# Patient Record
Sex: FEMALE | Race: WHITE | HISPANIC OR LATINO | Employment: PART TIME | URBAN - METROPOLITAN AREA
[De-identification: names, ages, dates, MRNs, and addresses within clinical notes are randomized per-mention and may not be internally consistent; named-entity substitution may affect disease eponyms.]

---
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
## 2019-01-13 ENCOUNTER — MYC MEDICAL ADVICE (OUTPATIENT)
Dept: PEDIATRICS | Facility: CLINIC | Age: 19
End: 2019-01-13

## 2019-01-22 ENCOUNTER — TELEPHONE (OUTPATIENT)
Dept: PEDIATRICS | Facility: CLINIC | Age: 19
End: 2019-01-22

## 2019-01-22 NOTE — TELEPHONE ENCOUNTER
[facility] is out of the 15 mg and 30 mg tablets of Buspar.      States that they have 10 mg available.  Approved order for 10 mg tablets take 1.5 tablets.    Marleni KIMBALL RN - [facility]

## 2019-01-22 NOTE — TELEPHONE ENCOUNTER
Fax received from [facility] [place], [place].  Buspirone hcl 15mg tabs on back order please send new prescription.    Thanks  Mamadou BERRY  [employer]

## 2019-02-04 ENCOUNTER — TELEPHONE (OUTPATIENT)
Dept: PEDIATRICS | Facility: CLINIC | Age: 19
End: 2019-02-04

## 2019-02-04 NOTE — TELEPHONE ENCOUNTER
Reason for call:  Other   Patient called regarding (reason for call): call back  Additional comments: Patient states that with last refill she was advised that her medication shape and size may change.  She has questions regarding the directions with her medication. Please call to discuss.     Phone number to reach patient:  Cell number on file:    Telephone Information:   Mobile [phone]       Best Time:  any    Can we leave a detailed message on this number?  YES

## 2019-02-06 NOTE — TELEPHONE ENCOUNTER
Non-detailed message left to return our call.  Marleni KIMBALL RN - Triage  [facility]

## 2019-02-13 NOTE — TELEPHONE ENCOUNTER
Contacted patient and she is on Buspirone.  Patient states that she received a script for her Buspar and they were 10 mg tablets and she is to take 1.5 tablets daily.  Verified patient has been taking 15 mg tablets previously.  Discussed medication has been on back order and so we have had to change tablets to maintain proper dosing.     Marleni KIMBALL RN - Triage  [facility]

## 2019-03-25 ENCOUNTER — MYC REFILL (OUTPATIENT)
Dept: PEDIATRICS | Facility: CLINIC | Age: 19
End: 2019-03-25

## 2019-03-25 DIAGNOSIS — G43.009 MIGRAINE WITHOUT AURA AND WITHOUT STATUS MIGRAINOSUS, NOT INTRACTABLE: ICD-10-CM

## 2019-03-28 RX ORDER — RIZATRIPTAN BENZOATE 5 MG/1
5-10 TABLET ORAL
Qty: 9 TABLET | Refills: 3 | Status: SHIPPED | OUTPATIENT
Start: 2019-03-28 | End: 2019-10-31

## 2019-03-28 NOTE — TELEPHONE ENCOUNTER
"Prescription approved per [facility], [facility] or [facility] refill protocol.  Marleni KIMBALL RN - Triage  [facility]          Requested Prescriptions   Pending Prescriptions Disp Refills     rizatriptan (MAXALT) 5 MG tablet 18 tablet 3     Sig: Take 1-2 tablets (5-10 mg) by mouth at onset of headache for migraine May repeat in 2 hours. Max 6 tablets/24 hours.    Serotonin Agonists Failed - 3/25/2019  6:48 PM       Failed - Serotonin Agonist request needs review.    Please review patient's record. If patient has had 8 or more treatments in the past month, please forward to provider.         Passed - Blood pressure under 140/90 in past 12 months    BP Readings from Last 3 Encounters:   12/19/18 116/74   08/06/18 100/70   06/25/18 102/76                Passed - Recent (12 mo) or future (30 days) visit within the authorizing provider's specialty    Patient had office visit in the last 12 months or has a visit in the next 30 days with authorizing provider or within the authorizing provider's specialty.  See \"Patient Info\" tab in inbasket, or \"Choose Columns\" in Meds & Orders section of the refill encounter.             Passed - Medication is active on med list       Passed - Patient is age 18 or older       Passed - No active pregnancy on record       Passed - No positive pregnancy test in past 12 months          "

## 2019-05-13 DIAGNOSIS — F32.1 MODERATE MAJOR DEPRESSION (H): ICD-10-CM

## 2019-05-13 DIAGNOSIS — F41.1 GAD (GENERALIZED ANXIETY DISORDER): ICD-10-CM

## 2019-05-13 NOTE — TELEPHONE ENCOUNTER
"Requested Prescriptions   Pending Prescriptions Disp Refills     FLUoxetine (PROZAC) 20 MG capsule [Pharmacy Med Name: FLUOXETINE 20MG CAPSULES] 270 capsule 0     Sig: TAKE 3 CAPSULES BY MOUTH EVERY DAY  Last Written Prescription Date:  12/19/2018  Last Fill Quantity: 270 capsule,  # refills: 1    Last office visit: 12/19/2018 with prescribing provider:  Lydia Rodriguez MD       Future Office Visit:           SSRIs Protocol Failed - 5/13/2019 10:21 AM        Failed - PHQ-9 score less than 5 in past 6 months     Please review last PHQ-9 score.   PHQ-9 SCORE 6/25/2018 8/6/2018 12/19/2018   PHQ-9 Total Score 17 18 17     DIAZ-7 SCORE 1/3/2018 8/6/2018 12/19/2018   Total Score 12 14 11                 Passed - Medication is active on med list        Passed - Patient is age 18 or older        Passed - No active pregnancy on record        Passed - No positive pregnancy test in last 12 months        Passed - Recent (6 mo) or future (30 days) visit within the authorizing provider's specialty     Patient had office visit in the last 6 months or has a visit in the next 30 days with authorizing provider or within the authorizing provider's specialty.  See \"Patient Info\" tab in inbasket, or \"Choose Columns\" in Meds & Orders section of the refill encounter.              "

## 2019-05-13 NOTE — TELEPHONE ENCOUNTER
Prozac  Medication is being filled for 1 time refill only due to:  Patient needs to be seen because mood follow up.    Lucille Zuñiga, RN, BSN

## 2019-06-06 ENCOUNTER — OFFICE VISIT (OUTPATIENT)
Dept: URGENT CARE | Facility: URGENT CARE | Age: 19
End: 2019-06-06
Payer: COMMERCIAL

## 2019-06-06 VITALS
BODY MASS INDEX: 25.7 KG/M2 | HEART RATE: 65 BPM | SYSTOLIC BLOOD PRESSURE: 114 MMHG | DIASTOLIC BLOOD PRESSURE: 66 MMHG | TEMPERATURE: 98.5 F | WEIGHT: 169 LBS | OXYGEN SATURATION: 100 %

## 2019-06-06 DIAGNOSIS — M25.511 CHRONIC RIGHT SHOULDER PAIN: Primary | ICD-10-CM

## 2019-06-06 DIAGNOSIS — G89.29 CHRONIC RIGHT SHOULDER PAIN: Primary | ICD-10-CM

## 2019-06-06 PROCEDURE — 99213 OFFICE O/P EST LOW 20 MIN: CPT | Performed by: FAMILY MEDICINE

## 2019-06-06 NOTE — PROGRESS NOTES
SUBJECTIVE:  Chief Complaint   Patient presents with     Urgent Care     Musculoskeletal Problem     right shoulder pain x 1 day. stiffness and pain. Decreased ROM. No known injury, but is avid swimmer. 8/10 Tx: rest, tylenol equivilant     Mireya Fritz is a 18 year old female who presents with a chief complaint of right shoulder pain.    Has struggled with right shoulder pain for the past year, was seen by [facility] and underwent physical therapy without complete resolution of pain.  Patient did obtain MRI and Xray and was told that was overuse.  Was in physical therapy for several months.  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    Yesterday developed acute pain in right shoulder, denies doing anything unusual, no trauma or fall.  Patient states that is painful to move and has not been able to move shoulder.  Tried taking tylenol but has not resolved.  No rash.      Past Medical History:   Diagnosis Date     NO ACTIVE PROBLEMS      Current Outpatient Medications   Medication Sig Dispense Refill     busPIRone (BUSPAR) 15 MG tablet Take 1 tablet (15 mg) by mouth 2 times daily 180 tablet 1     FLUoxetine (PROZAC) 20 MG capsule Take 3 capsules (60 mg) by mouth daily 270 capsule 1     norethindrone-ethinyl estradiol (DASETTA 1/35) 1-35 MG-MCG tablet Take 1 tablet by mouth daily 84 tablet 3     rizatriptan (MAXALT) 5 MG tablet Take 1-2 tablets (5-10 mg) by mouth at onset of headache for migraine May repeat in 2 hours. Max 6 tablets/24 hours. 9 tablet 3     Social History     Tobacco Use     Smoking status: Current Some Day Smoker     Types: Other     Smokeless tobacco: Never Used   Substance Use Topics     Alcohol use: No       ROS:  Review of systems negative except as stated above.    EXAM:   /66   Pulse 65   Temp 98.5  F (36.9  C) (Oral)   Wt 76.7 kg (169 lb)   SpO2 100%   BMI 25.70 kg/m    M/S Exam:right shoulder - discomfort anterior  aspect, decrease ROM.  No discomfort at AC joint.  GENERAL APPEARANCE: healthy, alert and no distress  EXTREMITIES: peripheral pulses normal  SKIN: no suspicious lesions or rashes    X-RAY was not done.    ASSESSMENT/PLAN:  (M25.511,  G89.29) Chronic right shoulder pain  (primary encounter diagnosis)  Plan: ORTHO  REFERRAL            Chronic right shoulder pain with acute worsening.  Reviewed possible tendinitis, frozen shoulder or possible rotator cuff tear.  Patient denies any acute injury that would be suggestive for fracture so will defer Xray.  Will refer to [facility] for further evaluation of shoulder pain.    Follow up with [facility] within 1 week    Wilmar Austin MD  June 6, 2019 6:26 PM

## 2019-06-06 NOTE — PATIENT INSTRUCTIONS
Okay to take ibuprofen 200 mg - 4 tablets (800 mg) every 8 hours as needed.  Okay to take tylenol 500 mg - 2 tablets (1000 mg) every 6-8 hours as needed, do not exceed 3000 mg in 24 hours.    Ice to area of discomfort.    Follow up with [facility] for further evaluation.      Patient Education     Shoulder Impingement Syndrome  The rotator cuff is a group of muscles and tendons that surround the shoulder joint. These muscles and tendons hold the arm in its joint. They help the shoulder move. The rotator cuff muscles and tendons can become irritated from repeated rubbing against the shoulder bone. This is called shoulder impingement syndrome or rotator cuff tendonitis.     If your case is mild, you may only need to rest the shoulder and then do certain exercises to strengthen the muscles. You can also take anti-inflammatory medicines. Steroid injections into the shoulder can ease inflammation. But you can have only a limited number of these. If the condition gets worse, your shoulder muscles may become thin and weak. This can lead to a rotator cuff tear.  Symptoms of shoulder impingement syndrome may include:    Shoulder pain that gets worse when you raise your arm overhead    Weakness of the shoulder muscles when you use your arm overhead    Popping and clicking when you move your shoulder    Shoulder pain that wakes you up at night, especially when you sleep on the affected shoulder    Sudden pain in your shoulder when you lift or reach  Home care  Follow these tips to take care of yourself at home:    Avoid activities that make your pain worse. These include raising your arms overhead, repeating the same motion over and over, or lifting heavy objects.    Don t hold your arm in one position for a long time. Keep it moving.    Put an ice pack on the sore area for 20 minutes every 1 to 2 hours for the first day. You can make an ice pack by putting ice cubes in a plastic bag. Wrap the bag in a towel before putting it on  your shoulder. A frozen bag of peas or something similar can also be used as an ice pack. Use the ice packs 3 to 4 times a day for the next 2 days. Continue using the ice to relieve of pain and swelling as needed.    You may take acetaminophen or ibuprofen to control pain, unless another medicine was prescribed. If prednisone was prescribed, don t take anti-inflammatory medicines. If you have chronic liver or kidney disease or ever had a stomach ulcer or gastrointestinal bleeding, talk with your doctor before using these medicines.    After your symptoms ease, you may get physical therapy or start a home exercise program. This can strengthen your shoulder muscles and help your range of motion. Talk with your doctor about what is best for your condition.  Follow-up care  Follow up with your healthcare provider, or as advised.  When to seek medical advice  Call your healthcare provider right away if any of these occur:    Shoulder pain that gets worse and wakes you up at night    Your shoulder or arm swells    Numbness, tingling, or pain that travels down the arm to the hand    Loss of shoulder strength    Fever or chills  Date Last Reviewed: 8/1/2016 2000-2018 [employer]. [address] [place] [address]. All rights reserved. This information is not intended as a substitute for professional medical care. Always follow your healthcare professional's instructions.           Patient Education     Frozen Shoulder (Adhesive Capsulitis)  Frozen shoulder is when pain and stiffness make it hard to move your shoulder normally. This condition is also called adhesive capsulitis.  The shoulder is a ball-and-socket joint. The ball on the upper arm bone fits into a socket on the shoulder blade. The joint is covered by strong connective tissue called the shoulder capsule.  If the shoulder capsule becomes inflamed and swollen, movement is painful. Bands of scar tissue form on the joint s surface. This  limits your shoulder's range of motion.  In most cases, only one shoulder at a time is affected. Some people may get frozen shoulder in the other shoulder, at a later time.  Frozen shoulder develops slowly in 3 stages. Each stage can last a few months or longer:  1. Painful stage. Pain occurs when raising your arm up or to the side, or when reaching behind your back. Your range of motion decreases. The pain may be worse at night and keep you from sleeping.  2. Frozen stage. Shoulder may be less painful, but it is stiffer. Range of motion is very limited.  3. Thawing stage. Range of motion starts to improve with treatment.  Experts don t know what causes frozen shoulder. It may occur after an injury such as a fracture. Or it may happen if the shoulder is immobile for a long time, such as after surgery. It may also be an autoimmune response. Risk factors include being over age 40, or having diabetes, heart disease, lung disease, or an overactive thyroid.  The diagnosis is made by physical exam and an X-ray of the shoulder joint. Sometimes an MRI is done to look for other causes.  Mild cases may be treated with a home exercise program and anti-inflammatory medicines. More severe cases require physical therapy. In some cases a steroid is shot, or injected, into the shoulder area. In hard to treat cases, forced movement of the shoulder is done while you are asleep under general anesthesia. This will break up scar tissue and increase your range of motion. In rare cases, surgery may be needed to remove the scar tissue. Over time, most people with frozen shoulder get back nearly all range of motion without pain. Recovery may take a few months up to 1 year. You may still feel some stiffness.  Home care    If physical therapy was prescribed, keep up with any home exercise program you were given.    Keep using the affected shoulder and arm as much as possible.    You may use over-the-counter pain medicine to control pain,  unless another pain medicine was prescribed. Anti-inflammatory pain medicines may work better than acetaminophen. If you have chronic liver or kidney disease or ever had a stomach ulcer or gastrointestinal bleeding, talk with your healthcare provider before using these medicines.  Follow-up care  Follow up with your healthcare provider, or as advised. Or follow up sooner if pain increases or your shoulder motion decreases.  If X-rays or an MRI were done, you will be told of any new findings that may affect your care.  When to seek medical advice  Call your healthcare provider right away if any of these occur:    Your shoulder is red or swollen    Your arm feels weak or numb    Your shoulder movement decreases    Your shoulder pain increases even after using pain medicines  Date Last Reviewed: 5/1/2018 2000-2018 [employer]. [address] [place] [address], [place] [address]. All rights reserved. This information is not intended as a substitute for professional medical care. Always follow your healthcare professional's instructions.

## 2019-06-10 DIAGNOSIS — F32.1 MODERATE MAJOR DEPRESSION (H): ICD-10-CM

## 2019-06-10 DIAGNOSIS — F41.1 GAD (GENERALIZED ANXIETY DISORDER): ICD-10-CM

## 2019-06-12 ENCOUNTER — OFFICE VISIT (OUTPATIENT)
Dept: ORTHOPEDICS | Facility: CLINIC | Age: 19
End: 2019-06-12
Payer: COMMERCIAL

## 2019-06-12 VITALS
WEIGHT: 169 LBS | HEIGHT: 68 IN | DIASTOLIC BLOOD PRESSURE: 82 MMHG | BODY MASS INDEX: 25.61 KG/M2 | SYSTOLIC BLOOD PRESSURE: 110 MMHG

## 2019-06-12 DIAGNOSIS — M75.21 BICEPS TENDINITIS OF RIGHT UPPER EXTREMITY: ICD-10-CM

## 2019-06-12 DIAGNOSIS — M75.41 IMPINGEMENT SYNDROME OF RIGHT SHOULDER: Primary | ICD-10-CM

## 2019-06-12 DIAGNOSIS — M25.311 INSTABILITY OF RIGHT SHOULDER JOINT: ICD-10-CM

## 2019-06-12 PROCEDURE — 99244 OFF/OP CNSLTJ NEW/EST MOD 40: CPT | Performed by: FAMILY MEDICINE

## 2019-06-12 RX ORDER — DICLOFENAC SODIUM 75 MG/1
75 TABLET, DELAYED RELEASE ORAL 2 TIMES DAILY PRN
Qty: 60 TABLET | Refills: 1 | Status: SHIPPED | OUTPATIENT
Start: 2019-06-12 | End: 2019-07-01

## 2019-06-12 ASSESSMENT — MIFFLIN-ST. JEOR: SCORE: 1590.08

## 2019-06-12 NOTE — LETTER
6/12/2019         RE: Mireya Fritz  [id number] [place] [address]        Dear Colleague,    Thank you for referring your patient, Mireya Fritz, to the [facility]. Please see a copy of my visit note below.    ASSESSMENT & PLAN  Patient Instructions     1. Impingement syndrome of right shoulder    2. Biceps tendinitis of right upper extremity    3. Instability of right shoulder joint      -Patient has right shoulder pain due to multidirectional laxity and instability causing bursitis and biceps tendinitis.  -Patient will start diclofenac 75 mg twice a day.  -Patient will start formal physical therapy with Vasyl Finney here at [place].  -She will follow-up in 6 weeks for reevaluation and progression of activity.  If no improvement to consider possible cortisone injection.  -Call direct clinic number [[phone]] at any time with questions or concerns.    Albert Yeo MD [facility]  [facility]          -----    SUBJECTIVE  Mireya Fritz is a/an 18 year old Right handed female who is seen in consultation at the request of  Wilmar Austin M.D. for evaluation of right shoulder pain. The patient is seen by themselves.    Onset: 1 week(s) ago. Reports insidious onset without acute precipitating event.  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    Past Medical History:    Diagnosis Date     NO ACTIVE PROBLEMS      Social History     Socioeconomic History     Marital status: Single     Spouse name: Not on file     Number of children: Not on file     Years of education: Not on file     Highest education level: Not on file   Occupational History     Not on file   Social Needs     Financial resource strain: Not on file     Food insecurity:     Worry: Not on file     Inability: Not on file     Transportation needs:     Medical: Not on file     Non-medical: Not on file   Tobacco Use     Smoking status: Current Some Day Smoker     Types: Other     Smokeless tobacco: Never Used   Substance and Sexual Activity     Alcohol use: No     Drug use: No     Sexual activity: Yes     Partners: Male   Lifestyle     Physical activity:     Days per week: Not on file     Minutes per session: Not on file     Stress: Not on file   Relationships     Social connections:     Talks on phone: Not on file     Gets together: Not on file     Attends Quaker service: Not on file     Active member of club or organization: Not on file     Attends meetings of clubs or organizations: Not on file     Relationship status: Not on file     Intimate partner violence:     Fear of current or ex partner: Not on file     Emotionally abused: Not on file     Physically abused: Not on file     Forced sexual activity: Not on file   Other Topics Concern     Parent/sibling w/ CABG, MI or angioplasty before 65F 55M? No   Social History Narrative     Not on file         Patient's past medical, surgical, social, and family histories were reviewed today and no changes are noted.    REVIEW OF SYSTEMS:  10 point ROS is negative other than symptoms noted above in HPI, Past Medical History or as stated below  Constitutional: NEGATIVE for fever, chills, change in weight  Skin: NEGATIVE for worrisome rashes, moles or lesions  GI/: NEGATIVE for bowel or bladder changes  Neuro: NEGATIVE for weakness, dizziness or  a/w sepsis due to likely RML/RLL aspiration PNA c/b mucus plugging, dehydration  in the context of advanced dementia and debility; found to have multiple decubitus ulcers; family reports R shoulder pain and no X-ray finding of a fracture. "paresthesias    OBJECTIVE:  /82   Ht 1.727 m (5' 8\")   Wt 76.7 kg (169 lb)   BMI 25.70 kg/m      General: healthy, alert and in no distress  HEENT: no scleral icterus or conjunctival erythema  Skin: no suspicious lesions or rash. No jaundice.  CV: regular rhythm by palpation  Resp: normal respiratory effort without conversational dyspnea   Psych: normal mood and affect  Gait: normal steady gait with appropriate coordination and balance  Neuro: normal light touch sensory exam of the bilateral upper extremities.    MSK:  RIGHT SHOULDER  Inspection:    no swelling, bruising, discoloration, or obvious deformity or asymmetry  Palpation:    Tender about the acromion, anterior capsule, proximal biceps tendon and greater tuberosity. Remainder of bony and tendinous landmarks are nontender.  Active Range of Motion:     Abduction normal0, FF normal0, ER normal0, IR normal.      Scapular dyskinesis absent  Strength:    Scapular plane abduction 5-/5,  ER 5-/5, IR 5/5, biceps 5/5, triceps 5/5  Special Tests:    Positive: Neer's, Cuadra', supraspinatus (empty can) and sulcus sign    Negative: drop arm/painful arc, crossed arm adduction, [place]'s, Speed's and Yergason's      Independent visualization of the below image:  No results found for this or any previous visit (from the past 24 hour(s)).   MR ARTHROGRAM RIGHT SHOULDER  12/6/2017  Result Impression   IMPRESSION:  Negative MR arthrogram of the right shoulder without evidence of rotator cuff or labral tear.   Result Narrative   COMPARISON:  08/31/2015 radiograph    TECHNIQUE:  Routine MR arthrogram protocol of the right shoulder was performed following the intraarticular injection of 10 mL of saline containing a 1:400 dilution of Gadavist.    FINDINGS:    CORACOACROMIAL ARCH/AC JOINT:  There is no significant degenerative arthritis at the acromioclavicular joint. Type 2 acromion. There is no significant thickening of the coracoacromial or coracohumeral ligaments. " There is no significant narrowing of the subacromial outlet or coracohumeral distance.    ROTATOR CUFF:  The supraspinatus, infraspinatus, teres minor and subscapularis tendons are intact without evidence of tear or tendinopathy. There is no atrophy of the rotator cuff muscles.    SUBACROMIAL/SUBDELTOID BURSA:  Normal.    GLENOHUMERAL JOINT:  There is no significant degenerative arthritis or focal cartilage defect. No osteocartilaginous bodies are identified. There is no gadolinium outside of the glenohumeral joint.    LONG HEAD OF THE BICEPS TENDON/GLENOID LABRUM:  The intra and extraarticular portions of the long head of the biceps tendon are normal. The biceps-labral anchor is intact. There is no evidence of labral tear.    MARROW AND SOFT TISSUES:  There is no abnormal marrow signal. There is no soft tissue mass.   Other Result Information   Ras, Rad Results In - 12/06/2017  2:32 PM CST  COMPARISON:  08/31/2015 radiograph    TECHNIQUE:  Routine MR arthrogram protocol of the right shoulder was performed following the intraarticular injection of 10 mL of saline containing a 1:400 dilution of Gadavist.    FINDINGS:    CORACOACROMIAL ARCH/AC JOINT:  There is no significant degenerative arthritis at the acromioclavicular joint. Type 2 acromion. There is no significant thickening of the coracoacromial or coracohumeral ligaments. There is no significant narrowing of the subacromial outlet or coracohumeral distance.    ROTATOR CUFF:  The supraspinatus, infraspinatus, teres minor and subscapularis tendons are intact without evidence of tear or tendinopathy. There is no atrophy of the rotator cuff muscles.    SUBACROMIAL/SUBDELTOID BURSA:  Normal.    GLENOHUMERAL JOINT:  There is no significant degenerative arthritis or focal cartilage defect. No osteocartilaginous bodies are identified. There is no gadolinium outside of the glenohumeral joint.    LONG HEAD OF THE BICEPS TENDON/GLENOID LABRUM:  The intra and  extraarticular portions of the long head of the biceps tendon are normal. The biceps-labral anchor is intact. There is no evidence of labral tear.    MARROW AND SOFT TISSUES:  There is no abnormal marrow signal. There is no soft tissue mass.    IMPRESSION  IMPRESSION:  Negative MR arthrogram of the right shoulder without evidence of rotator cuff or labral tear.       XR RIGHT SHOULDER 9/2/2015  Result Narrative   Indication: Right shoulder pain.  No fracture, subluxation or joint space abnormality.   Other Result Information   Interface, In Hp Pn Radiology Results - 06/18/2016  5:15 PM CDT  Indication: Right shoulder pain.  No fracture, subluxation or joint space abnormality.           Albert Yeo MD [facility]      Again, thank you for allowing me to participate in the care of your patient.        Sincerely,        Albert Yeo, MD     88y/o F w/ PMH of SBO, HTN, HLD, advanced dementia (AOx0-1 baseline) a/w sepsis due to likely RML/RLL aspiration PNA c/b mucus plugging, dehydration  in the context of advanced dementia and debility; found to have multiple decubitus ulcers; family reports R shoulder pain and no X-ray finding of a fracture. 88y/o F w/ PMH of SBO, HTN, HLD, advanced dementia (AOx0-1 baseline) a/w sepsis due to likely RML/RLL aspiration PNA c/b mucus plugging, dehydration  in the context of advanced dementia and debility; Found to have multiple decubiti; Family reports Rt shoulder pain - no X-ray finding of a fracture;

## 2019-06-12 NOTE — PATIENT INSTRUCTIONS
1. Impingement syndrome of right shoulder    2. Biceps tendinitis of right upper extremity    3. Instability of right shoulder joint      -Patient has right shoulder pain due to multidirectional laxity and instability causing bursitis and biceps tendinitis.  -Reviewed MR arthrogram of the right shoulder.  -Patient will start diclofenac 75 mg twice a day.  -Patient will start formal physical therapy with Vasyl Finney here at [place].  -She will follow-up in 6 weeks for reevaluation and progression of activity.  If no improvement to consider possible cortisone injection.  -Call direct clinic number [[phone]] at any time with questions or concerns.    Albert Yeo MD [facility] and Sports Medicine  [facility] [place]

## 2019-06-12 NOTE — PROGRESS NOTES
ASSESSMENT & PLAN  Patient Instructions     1. Impingement syndrome of right shoulder    2. Biceps tendinitis of right upper extremity    3. Instability of right shoulder joint      -Patient has right shoulder pain due to multidirectional laxity and instability causing bursitis and biceps tendinitis.  -Patient will start diclofenac 75 mg twice a day.  -Patient will start formal physical therapy with Vasyl Finney here at [place].  -She will follow-up in 6 weeks for reevaluation and progression of activity.  If no improvement to consider possible cortisone injection.  -Call direct clinic number [[phone]] at any time with questions or concerns.    Albert Yeo MD [facility]  [facility]          -----    SUBJECTIVE  Mireya Fritz is a/an 18 year old Right handed female who is seen in consultation at the request of  Wilmar Austin M.D. for evaluation of right shoulder pain. The patient is seen by themselves.    Onset: 1 week(s) ago. Reports insidious onset without acute precipitating event.  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    Past Medical History:   Diagnosis Date     NO ACTIVE PROBLEMS      Social History     Socioeconomic History     Marital status: Single     Spouse name: Not on file     Number of children: Not on file     Years of education: Not on file     Highest education level: Not on file  "  Occupational History     Not on file   Social Needs     Financial resource strain: Not on file     Food insecurity:     Worry: Not on file     Inability: Not on file     Transportation needs:     Medical: Not on file     Non-medical: Not on file   Tobacco Use     Smoking status: Current Some Day Smoker     Types: Other     Smokeless tobacco: Never Used   Substance and Sexual Activity     Alcohol use: No     Drug use: No     Sexual activity: Yes     Partners: Male   Lifestyle     Physical activity:     Days per week: Not on file     Minutes per session: Not on file     Stress: Not on file   Relationships     Social connections:     Talks on phone: Not on file     Gets together: Not on file     Attends Congregation service: Not on file     Active member of club or organization: Not on file     Attends meetings of clubs or organizations: Not on file     Relationship status: Not on file     Intimate partner violence:     Fear of current or ex partner: Not on file     Emotionally abused: Not on file     Physically abused: Not on file     Forced sexual activity: Not on file   Other Topics Concern     Parent/sibling w/ CABG, MI or angioplasty before 65F 55M? No   Social History Narrative     Not on file         Patient's past medical, surgical, social, and family histories were reviewed today and no changes are noted.    REVIEW OF SYSTEMS:  10 point ROS is negative other than symptoms noted above in HPI, Past Medical History or as stated below  Constitutional: NEGATIVE for fever, chills, change in weight  Skin: NEGATIVE for worrisome rashes, moles or lesions  GI/: NEGATIVE for bowel or bladder changes  Neuro: NEGATIVE for weakness, dizziness or paresthesias    OBJECTIVE:  /82   Ht 1.727 m (5' 8\")   Wt 76.7 kg (169 lb)   BMI 25.70 kg/m     General: healthy, alert and in no distress  HEENT: no scleral icterus or conjunctival erythema  Skin: no suspicious lesions or rash. No jaundice.  CV: regular rhythm by " palpation  Resp: normal respiratory effort without conversational dyspnea   Psych: normal mood and affect  Gait: normal steady gait with appropriate coordination and balance  Neuro: normal light touch sensory exam of the bilateral upper extremities.    MSK:  RIGHT SHOULDER  Inspection:    no swelling, bruising, discoloration, or obvious deformity or asymmetry  Palpation:    Tender about the acromion, anterior capsule, proximal biceps tendon and greater tuberosity. Remainder of bony and tendinous landmarks are nontender.  Active Range of Motion:     Abduction normal0, FF normal0, ER normal0, IR normal.      Scapular dyskinesis absent  Strength:    Scapular plane abduction 5-/5,  ER 5-/5, IR 5/5, biceps 5/5, triceps 5/5  Special Tests:    Positive: Neer's, Cuadra', supraspinatus (empty can) and sulcus sign    Negative: drop arm/painful arc, crossed arm adduction, [place]'s, Speed's and Yergason's      Independent visualization of the below image:  No results found for this or any previous visit (from the past 24 hour(s)).   MR ARTHROGRAM RIGHT SHOULDER  12/6/2017  Result Impression   IMPRESSION:  Negative MR arthrogram of the right shoulder without evidence of rotator cuff or labral tear.   Result Narrative   COMPARISON:  08/31/2015 radiograph    TECHNIQUE:  Routine MR arthrogram protocol of the right shoulder was performed following the intraarticular injection of 10 mL of saline containing a 1:400 dilution of Gadavist.    FINDINGS:    CORACOACROMIAL ARCH/AC JOINT:  There is no significant degenerative arthritis at the acromioclavicular joint. Type 2 acromion. There is no significant thickening of the coracoacromial or coracohumeral ligaments. There is no significant narrowing of the subacromial outlet or coracohumeral distance.    ROTATOR CUFF:  The supraspinatus, infraspinatus, teres minor and subscapularis tendons are intact without evidence of tear or tendinopathy. There is no atrophy of the rotator cuff  muscles.    SUBACROMIAL/SUBDELTOID BURSA:  Normal.    GLENOHUMERAL JOINT:  There is no significant degenerative arthritis or focal cartilage defect. No osteocartilaginous bodies are identified. There is no gadolinium outside of the glenohumeral joint.    LONG HEAD OF THE BICEPS TENDON/GLENOID LABRUM:  The intra and extraarticular portions of the long head of the biceps tendon are normal. The biceps-labral anchor is intact. There is no evidence of labral tear.    MARROW AND SOFT TISSUES:  There is no abnormal marrow signal. There is no soft tissue mass.   Other Result Information   Ras, Rad Results In - 12/06/2017  2:32 PM CST  COMPARISON:  08/31/2015 radiograph    TECHNIQUE:  Routine MR arthrogram protocol of the right shoulder was performed following the intraarticular injection of 10 mL of saline containing a 1:400 dilution of Gadavist.    FINDINGS:    CORACOACROMIAL ARCH/AC JOINT:  There is no significant degenerative arthritis at the acromioclavicular joint. Type 2 acromion. There is no significant thickening of the coracoacromial or coracohumeral ligaments. There is no significant narrowing of the subacromial outlet or coracohumeral distance.    ROTATOR CUFF:  The supraspinatus, infraspinatus, teres minor and subscapularis tendons are intact without evidence of tear or tendinopathy. There is no atrophy of the rotator cuff muscles.    SUBACROMIAL/SUBDELTOID BURSA:  Normal.    GLENOHUMERAL JOINT:  There is no significant degenerative arthritis or focal cartilage defect. No osteocartilaginous bodies are identified. There is no gadolinium outside of the glenohumeral joint.    LONG HEAD OF THE BICEPS TENDON/GLENOID LABRUM:  The intra and extraarticular portions of the long head of the biceps tendon are normal. The biceps-labral anchor is intact. There is no evidence of labral tear.    MARROW AND SOFT TISSUES:  There is no abnormal marrow signal. There is no soft tissue mass.    IMPRESSION  IMPRESSION:  Negative MR  arthrogram of the right shoulder without evidence of rotator cuff or labral tear.       XR RIGHT SHOULDER 9/2/2015  Result Narrative   Indication: Right shoulder pain.  No fracture, subluxation or joint space abnormality.   Other Result Information   Interface, In Hp Pn Radiology Results - 06/18/2016  5:15 PM CDT  Indication: Right shoulder pain.  No fracture, subluxation or joint space abnormality.           Albert Yeo MD [facility]

## 2019-06-21 ENCOUNTER — THERAPY VISIT (OUTPATIENT)
Dept: PHYSICAL THERAPY | Facility: CLINIC | Age: 19
End: 2019-06-21
Payer: COMMERCIAL

## 2019-06-21 DIAGNOSIS — M75.41 IMPINGEMENT SYNDROME OF RIGHT SHOULDER: ICD-10-CM

## 2019-06-21 DIAGNOSIS — M25.311 INSTABILITY OF RIGHT SHOULDER JOINT: ICD-10-CM

## 2019-06-21 DIAGNOSIS — M75.21 BICEPS TENDINITIS OF RIGHT UPPER EXTREMITY: ICD-10-CM

## 2019-06-21 PROCEDURE — 97110 THERAPEUTIC EXERCISES: CPT | Mod: GP | Performed by: PHYSICAL THERAPIST

## 2019-06-21 PROCEDURE — 97161 PT EVAL LOW COMPLEX 20 MIN: CPT | Mod: GP | Performed by: PHYSICAL THERAPIST

## 2019-06-21 NOTE — PROGRESS NOTES
[facility] Initial Evaluation  Mireya Fritz is a 18 year old  female referred to physical therapy by Dr. Yeo for treatment of right shoulder pain with Precautions/Restrictions/MD instructions eval and treat     Physical Therapy Initial Evaluation: Subjective History      Injury/Condition Details:  Presenting Complaint Right shoulder pain   Onset Timing/Date Chronic right shoulder pain for 1+ year   Mechanism Swimming in high school and overused right shoulder      Symptom Behavior Details    Primary Pain Symptoms Location: Anterior shoulder   Quality: achy sometimes sharp   Frequency: Constant   Worst Pain 10/10   Best Pain 2/10   Symptom Provocators Reaching, lifting, swimming   Symptom Relievers Activity avoidance, rest, medication   Time of day dependent? Worse during the day   Recent symptom change? 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   Orthopaedic history None   Notable medical history Depression, mental illness, migraines/headaches, smoking     SHOULDER EVALUATION  Static Posture:  Forward head: Moderate Rounded shoulders: Mild Scapular winging: Moderate    Flip Sign: + bilaterally     Range of Motion:    AROM Flexion Abduction Flex/ER Ext/IR   Left 180 180 T2 T5   Right 180 180 T2 T5     Strength:  MMT Flex Scaption Abd ER @ 0 IR @ 0 Mid trap Lower trap   Left 5/5 5/5 5/5 5/5 5/5     Right 4+/5 4+/5 4+/5 4+/5 5/5       Special Tests  Left Right   Empty Can - +   Drop arm  - -   Barry's  - -   Speed's - +   Cuadra-Seun - +   Neer - +   Lift-off - -   Apprehension - -   Rhodes's - -   Sulcus Sign - -   AC cross body - -                                              Palpation:  Left: Unremarkable  Right: + tenderness over  bicep tendon and upper trapezius    HPI                    Objective:  System    Physical Exam    General     ROS    Assessment/Plan:    Patient is a 18 year old female with right side shoulder complaints.    Patient has the following significant findings with corresponding treatment plan.                Diagnosis 1:  Right shoulder pain  Pain -  manual therapy, splint/taping/bracing/orthotics, self management, education and home program  Decreased strength - therapeutic exercise, therapeutic activities and home program  Decreased proprioception - neuro re-education, therapeutic activities and home program  Impaired gait - gait training and home program  Impaired posture - neuro re-education and home program    Therapy Evaluation Codes:   1) History comprised of:   Personal factors that impact the plan of care:      None.    Comorbidity factors that impact the plan of care are:      Depression, Mental illness, Migraines/headaches and Smoking.     Medications impacting care: Anti-depressant and Anti-inflammatory.  2) Examination of Body Systems comprised of:   Body structures and functions that impact the plan of care:      Shoulder.   Activity limitations that impact the plan of care are:      Dressing, Lifting and Reaching.  3) Clinical presentation characteristics are:   Stable/Uncomplicated.  4) Decision-Making    Low complexity using standardized patient assessment instrument and/or measureable assessment of functional outcome.  Cumulative Therapy Evaluation is: Low complexity.    Previous and current functional limitations:  (See Goal Flow Sheet for this information)    Short term and Long term goals: (See Goal Flow Sheet for this information)     Communication ability:  Patient appears to be able to clearly communicate and understand verbal and written communication and follow directions correctly.  Treatment Explanation - The following has been discussed with the patient:   RX ordered/plan of  care  Anticipated outcomes  Possible risks and side effects  This patient would benefit from PT intervention to resume normal activities.   Rehab potential is good.    Frequency:  1 X week, once daily  Duration:  for 6 weeks  Discharge Plan:  Achieve all LTG.  Independent in home treatment program.  Reach maximal therapeutic benefit.    Please refer to the daily flowsheet for treatment today, total treatment time and time spent performing 1:1 timed codes.

## 2019-07-01 ENCOUNTER — OFFICE VISIT (OUTPATIENT)
Dept: PEDIATRICS | Facility: CLINIC | Age: 19
End: 2019-07-01
Payer: COMMERCIAL

## 2019-07-01 VITALS
WEIGHT: 184.9 LBS | SYSTOLIC BLOOD PRESSURE: 110 MMHG | HEIGHT: 68 IN | TEMPERATURE: 99 F | BODY MASS INDEX: 28.02 KG/M2 | RESPIRATION RATE: 16 BRPM | OXYGEN SATURATION: 98 % | DIASTOLIC BLOOD PRESSURE: 80 MMHG | HEART RATE: 75 BPM

## 2019-07-01 DIAGNOSIS — N94.6 DYSMENORRHEA: ICD-10-CM

## 2019-07-01 DIAGNOSIS — Z23 NEED FOR MENINGITIS VACCINATION: ICD-10-CM

## 2019-07-01 DIAGNOSIS — N92.6 IRREGULAR MENSES: ICD-10-CM

## 2019-07-01 DIAGNOSIS — F41.1 GAD (GENERALIZED ANXIETY DISORDER): ICD-10-CM

## 2019-07-01 DIAGNOSIS — F32.1 MODERATE MAJOR DEPRESSION (H): Primary | ICD-10-CM

## 2019-07-01 PROCEDURE — 90620 MENB-4C VACCINE IM: CPT | Performed by: PEDIATRICS

## 2019-07-01 PROCEDURE — 99214 OFFICE O/P EST MOD 30 MIN: CPT | Mod: 25 | Performed by: PEDIATRICS

## 2019-07-01 PROCEDURE — 90471 IMMUNIZATION ADMIN: CPT | Performed by: PEDIATRICS

## 2019-07-01 RX ORDER — BUSPIRONE HYDROCHLORIDE 30 MG/1
30 TABLET ORAL 2 TIMES DAILY
Qty: 180 TABLET | Refills: 3 | Status: SHIPPED | OUTPATIENT
Start: 2019-07-01 | End: 2020-09-21

## 2019-07-01 ASSESSMENT — ANXIETY QUESTIONNAIRES
5. BEING SO RESTLESS THAT IT IS HARD TO SIT STILL: SEVERAL DAYS
1. FEELING NERVOUS, ANXIOUS, OR ON EDGE: NEARLY EVERY DAY
7. FEELING AFRAID AS IF SOMETHING AWFUL MIGHT HAPPEN: MORE THAN HALF THE DAYS
3. WORRYING TOO MUCH ABOUT DIFFERENT THINGS: MORE THAN HALF THE DAYS
GAD7 TOTAL SCORE: 14
IF YOU CHECKED OFF ANY PROBLEMS ON THIS QUESTIONNAIRE, HOW DIFFICULT HAVE THESE PROBLEMS MADE IT FOR YOU TO DO YOUR WORK, TAKE CARE OF THINGS AT HOME, OR GET ALONG WITH OTHER PEOPLE: VERY DIFFICULT
2. NOT BEING ABLE TO STOP OR CONTROL WORRYING: MORE THAN HALF THE DAYS
6. BECOMING EASILY ANNOYED OR IRRITABLE: NEARLY EVERY DAY

## 2019-07-01 ASSESSMENT — PATIENT HEALTH QUESTIONNAIRE - PHQ9
SUM OF ALL RESPONSES TO PHQ QUESTIONS 1-9: 13
10. IF YOU CHECKED OFF ANY PROBLEMS, HOW DIFFICULT HAVE THESE PROBLEMS MADE IT FOR YOU TO DO YOUR WORK, TAKE CARE OF THINGS AT HOME, OR GET ALONG WITH OTHER PEOPLE: SOMEWHAT DIFFICULT
5. POOR APPETITE OR OVEREATING: SEVERAL DAYS
SUM OF ALL RESPONSES TO PHQ QUESTIONS 1-9: 13

## 2019-07-01 ASSESSMENT — MIFFLIN-ST. JEOR: SCORE: 1662.2

## 2019-07-01 NOTE — PROGRESS NOTES
Subjective     Mireya Fritz is a 18 year old female who presents to clinic today for the following health issues:    History of Present Illness        Mental Health Follow-up:  Patient presents to follow-up on Depression & Anxiety.Patient's depression since last visit has been:  Better  The patient is having other symptoms associated with depression.  Patient's anxiety since last visit has been:  Medium  The patient is having other symptoms associated with anxiety.  Any significant life events: relationship concerns, job concerns and health concerns  Patient is feeling anxious or having panic attacks.  Patient has no concerns about alcohol or drug use.     Social History  Tobacco Use    Smoking status: Current Some Day Smoker      Types: Other    Smokeless tobacco: Never Used  Alcohol use: No  Drug use: No      Today's PHQ-9         PHQ-9 Total Score:     (P) 13   PHQ-9 Q9 Thoughts of better off dead/self-harm past 2 weeks :   (P) Several days   Thoughts of suicide or self harm:  (P) No   Self-harm Plan:        Self-harm Action:          Safety concerns for self or others: (P) No         She eats 0-1 servings of fruits and vegetables daily.She consumes 1 sweetened beverage(s) daily.  She is taking medications regularly.     Patient states that she has some pain on the outside of her left ear, it's been bothering her since mothers day.     HPI:      Psych major, college is going well at [employer].  Had a great first year there.    Recently increased anxiety.  When anxiety gets high, depression becomes more problematic.  Anxiety without a specific trigger, but has been bothering her more.   Feels that depression was under ok control this year.    PHQ-9 SCORE 12/19/2018 7/1/2019 7/1/2019   PHQ-9 Total Score MyChart - - 13 (Moderate depression)   PHQ-9 Total Score 17 14 13     DIAZ-7 SCORE 8/6/2018 12/19/2018 7/1/2019   Total Score 14 11 14     Endorses some passive suicidal ideation that has been intermittent for  "her.  She has never had a specific plan and reports that she would never act on these thoughts.  Strong support system and good safety planning.  Actively planning for the future.  Denies side effects related to medication.    Still seeing her long term therapist, Aminta, for therapy.      Left ear pain - was swollen in May and hurt, but  over tragus.  No drainage or related piercing.      Right shoulder - pain increased recently - went to ortho and now in physical therapy - largely ligament related damage.    Birth control going well    Being followed for TMJ issues - has a mouth guard and reports starting a muscle relaxant at night planned.    Reviewed and updated as needed this visit by Provider         Review of Systems   ROS COMP: Constitutional, ENT, msk, psych systems are negative, except as otherwise noted.      Objective    /80 (BP Location: Right arm, Patient Position: Sitting, Cuff Size: Adult Regular)   Pulse 75   Temp 99  F (37.2  C) (Oral)   Resp 16   Ht 1.727 m (5' 8\")   Wt 83.9 kg (184 lb 14.4 oz)   SpO2 98%   BMI 28.11 kg/m    Body mass index is 28.11 kg/m .  Physical Exam   GENERAL: healthy, alert and no distress  HENT: ear canals and TM's normal, nose and mouth without ulcers or lesions  RESP: lungs clear to auscultation - no rales, rhonchi or wheezes  CV: regular rate and rhythm, normal S1 S2, no S3 or S4, no murmur, click or rub, no peripheral edema and peripheral pulses strong  PSYCH: mentation appears normal, affect normal/bright        Assessment & Plan       ICD-10-CM    1. Moderate major depression (H) F32.1 FLUoxetine (PROZAC) 20 MG capsule    Continue current prozac dosing and weekly therapy   2. DIAZ (generalized anxiety disorder) F41.1 FLUoxetine (PROZAC) 20 MG capsule     busPIRone HCl (BUSPAR) 30 MG tablet  Worsening anxiety recently - start higher dose of buspar and follow up with me via Evisit in 4 weeks -sooner with any worsening   3. Irregular menses N92.6 " "norethindrone-ethinyl estradiol (DASETTA 1/35) 1-35 MG-MCG tablet  Well controlled, continue current medications     4. Dysmenorrhea N94.6 norethindrone-ethinyl estradiol (DASETTA 1/35) 1-35 MG-MCG tablet  Well controlled, continue current medications     5. Need for meningitis vaccination Z23 MEN B, IM (10 - 25 YRS) - Bexsero        Tobacco Cessation:   reports that she has been smoking other.  She has never used smokeless tobacco.        BMI:   Estimated body mass index is 28.11 kg/m  as calculated from the following:    Height as of this encounter: 1.727 m (5' 8\").    Weight as of this encounter: 83.9 kg (184 lb 14.4 oz).   Weight management plan: Discussed healthy diet and exercise guidelines        See Patient Instructions    No follow-ups on file.    Lydia Rodriguez MD  [facility]    Answers for HPI/ROS submitted by the patient on 7/1/2019   Chronic problems general questions HPI Form  If you checked off any problems, how difficult have these problems made it for you to do your work, take care of things at home, or get along with other people?: Somewhat difficult  PHQ9 TOTAL SCORE: 13    "

## 2019-07-01 NOTE — PATIENT INSTRUCTIONS
Ear looks great today - let me know if it bothers you     Continue your fluoxetine at the current dose, but increase your buspar to 30mg twice daily    Send me an evisit with how you are doing in about 4 weeks - sooner with any problems    Keep seeing Aminta cortes

## 2019-07-02 ENCOUNTER — MYC MEDICAL ADVICE (OUTPATIENT)
Dept: PEDIATRICS | Facility: CLINIC | Age: 19
End: 2019-07-02

## 2019-07-02 ASSESSMENT — ANXIETY QUESTIONNAIRES: GAD7 TOTAL SCORE: 14

## 2019-07-02 NOTE — TELEPHONE ENCOUNTER
"Pt asked about possible side-effects to the meningitis vaccine they received yesterday.    C/o lethargy, sore arm.    Pt informed of noted side-effects from the  of the Bexsero vaccine. Informed that most side-effects are temporary.    Encouraged pt to monitor side-effects and seek emergency services with they experience signs of anaphylaxis.    Chemo \"Jose E\" COLIN Riddle - Triage  [facility]    "

## 2019-07-05 ENCOUNTER — THERAPY VISIT (OUTPATIENT)
Dept: PHYSICAL THERAPY | Facility: CLINIC | Age: 19
End: 2019-07-05
Payer: COMMERCIAL

## 2019-07-05 DIAGNOSIS — M75.21 BICEPS TENDINITIS OF RIGHT UPPER EXTREMITY: ICD-10-CM

## 2019-07-05 PROCEDURE — 97140 MANUAL THERAPY 1/> REGIONS: CPT | Mod: GP | Performed by: PHYSICAL THERAPIST

## 2019-07-05 PROCEDURE — 97110 THERAPEUTIC EXERCISES: CPT | Mod: GP | Performed by: PHYSICAL THERAPIST

## 2019-07-05 PROCEDURE — 97112 NEUROMUSCULAR REEDUCATION: CPT | Mod: GP | Performed by: PHYSICAL THERAPIST

## 2019-07-08 ENCOUNTER — THERAPY VISIT (OUTPATIENT)
Dept: PHYSICAL THERAPY | Facility: CLINIC | Age: 19
End: 2019-07-08
Payer: COMMERCIAL

## 2019-07-08 DIAGNOSIS — M75.21 BICEPS TENDINITIS OF RIGHT UPPER EXTREMITY: ICD-10-CM

## 2019-07-08 PROCEDURE — 97110 THERAPEUTIC EXERCISES: CPT | Mod: GP | Performed by: PHYSICAL THERAPIST

## 2019-07-08 PROCEDURE — 97140 MANUAL THERAPY 1/> REGIONS: CPT | Mod: GP | Performed by: PHYSICAL THERAPIST

## 2019-07-08 PROCEDURE — 97112 NEUROMUSCULAR REEDUCATION: CPT | Mod: GP | Performed by: PHYSICAL THERAPIST

## 2019-07-17 ENCOUNTER — MYC MEDICAL ADVICE (OUTPATIENT)
Dept: PEDIATRICS | Facility: CLINIC | Age: 19
End: 2019-07-17

## 2019-08-01 ENCOUNTER — OFFICE VISIT (OUTPATIENT)
Dept: ORTHOPEDICS | Facility: CLINIC | Age: 19
End: 2019-08-01
Payer: COMMERCIAL

## 2019-08-01 VITALS
HEIGHT: 68 IN | DIASTOLIC BLOOD PRESSURE: 88 MMHG | SYSTOLIC BLOOD PRESSURE: 122 MMHG | BODY MASS INDEX: 27.89 KG/M2 | WEIGHT: 184 LBS

## 2019-08-01 DIAGNOSIS — M75.41 IMPINGEMENT SYNDROME OF RIGHT SHOULDER: Primary | ICD-10-CM

## 2019-08-01 DIAGNOSIS — M75.21 BICEPS TENDINITIS OF RIGHT UPPER EXTREMITY: ICD-10-CM

## 2019-08-01 DIAGNOSIS — M25.311 INSTABILITY OF RIGHT SHOULDER JOINT: ICD-10-CM

## 2019-08-01 PROCEDURE — 99214 OFFICE O/P EST MOD 30 MIN: CPT | Performed by: FAMILY MEDICINE

## 2019-08-01 RX ORDER — MELOXICAM 15 MG/1
15 TABLET ORAL DAILY
Qty: 30 TABLET | Refills: 1 | Status: SHIPPED | OUTPATIENT
Start: 2019-08-01 | End: 2020-02-18

## 2019-08-01 ASSESSMENT — MIFFLIN-ST. JEOR: SCORE: 1658.12

## 2019-08-01 NOTE — PROGRESS NOTES
ASSESSMENT & PLAN  Patient Instructions     1. Impingement syndrome of right shoulder    2. Biceps tendinitis of right upper extremity    3. Instability of right shoulder joint      -Patient is here for follow-up of right shoulder pain due to ligament laxity and instability causing impingement and tendinitis.  -Patient has had a flareup of pain due to increased demand at work and inability to continue with her therapy.  Patient will continue with therapy while she is still here before she goes away to school where she will continue with her home strengthening program.  -Patient had no pain improvement with diclofenac and so will try meloxicam 15 mg p.o. daily.  -Patient may follow-up when she returns back from school.  -Patient also has chronic TMJ pain and should follow up with Oral and Maxillofacial Surgery specialist for second opinion and further treatments  -Call direct clinic number [[phone]] at any time with questions or concerns.    Albert Yeo MD [facility] and [facility] Medicine  [facility]          -----    SUBJECTIVE:  Mireya Fritz is a 18 year old female who is seen in follow-up for right shoulder pain.They were last seen 6/12/2019.     Since their last visit reports that she had about 50% improvement when she was attending physical therapy and completing home exercises regularly, but she feels like she has regressed. Patient states that she started a new job and hasn't been able to do physical therapy exercises as often. Patient notes increased achy pain when reaching across her chest, holding arm out in front of her body, and overhead movements. Patient notes that her arm feels weak. They indicate that their current pain level is 4/10. They have tried rest/activity avoidance, other medications: diclofenac, home exercises and physical therapy (3 visits).      The patient is seen by themselves.    Patient's past medical, surgical, social, and family histories  "were reviewed today and no changes are noted.    REVIEW OF SYSTEMS:  Constitutional: NEGATIVE for fever, chills, change in weight  Skin: NEGATIVE for worrisome rashes, moles or lesions  GI/: NEGATIVE for bowel or bladder changes  Neuro: NEGATIVE for weakness, dizziness or paresthesias    OBJECTIVE:  /88   Ht 1.727 m (5' 8\")   Wt 83.5 kg (184 lb)   BMI 27.98 kg/m     General: healthy, alert and in no distress  HEENT: no scleral icterus or conjunctival erythema  Skin: no suspicious lesions or rash. No jaundice.  CV: regular rhythm by palpation, no pedal edema  Resp: normal respiratory effort without conversational dyspnea   Psych: normal mood and affect  Gait: normal steady gait with appropriate coordination and balance  Neuro: normal light touch sensory exam of the extremities.    MSK:  RIGHT SHOULDER  Inspection:    no swelling, bruising, discoloration, or obvious deformity or asymmetry  Palpation:    Tender about the acromion, anterior capsule, proximal biceps tendon and greater tuberosity. Remainder of bony and tendinous landmarks are nontender.  Active Range of Motion:     Abduction normal0, FF normal0, ER normal0, IR normal.      Scapular dyskinesis absent  Strength:    Scapular plane abduction 5-/5,  ER 5-/5, IR 5/5, biceps 5/5, triceps 5/5  Special Tests:    Positive: Neer's, Cuadra', supraspinatus (empty can), Speeds, Claire's, and sulcus sign    Negative: drop arm/painful arc, crossed arm adduction, and Yergason's        Albert Yeo MD, [facility] Orthopedic Care        "

## 2019-08-01 NOTE — LETTER
8/1/2019         RE: Mireya Fritz  [address] [place] [address]        Dear Colleague,    Thank you for referring your patient, Mireya Fritz, to the [facility]. Please see a copy of my visit note below.    ASSESSMENT & PLAN  Patient Instructions     1. Impingement syndrome of right shoulder    2. Biceps tendinitis of right upper extremity    3. Instability of right shoulder joint      -Patient is here for follow-up of right shoulder pain due to ligament laxity and instability causing impingement and tendinitis.  -Patient has had a flareup of pain due to increased demand at work and inability to continue with her therapy.  Patient will continue with therapy while she is still here before she goes away to school where she will continue with her home strengthening program.  -Patient had no pain improvement with diclofenac and so will try meloxicam 15 mg p.o. daily.  -Patient may follow-up when she returns back from school.  -Patient also has chronic TMJ pain and should follow up with Oral and Maxillofacial Surgery specialist for second opinion and further treatments  -Call direct clinic number [[phone]] at any time with questions or concerns.    Albert Yeo MD [facility] Orthopedics and Sports Medicine  [facility]          -----    SUBJECTIVE:  Mireya Fritz is a 18 year old female who is seen in follow-up for right shoulder pain.They were last seen 6/12/2019.     Since their last visit reports that she had about 50% improvement when she was attending physical therapy and completing home exercises regularly, but she feels like she has regressed. Patient states that she started a new job and hasn't been able to do physical therapy exercises as often. Patient notes increased achy pain when reaching across her chest, holding arm out in front of her body, and overhead movements. Patient notes that her arm feels weak. They indicate  "that their current pain level is 4/10. They have tried rest/activity avoidance, other medications: diclofenac, home exercises and physical therapy (3 visits).      The patient is seen by themselves.    Patient's past medical, surgical, social, and family histories were reviewed today and no changes are noted.    REVIEW OF SYSTEMS:  Constitutional: NEGATIVE for fever, chills, change in weight  Skin: NEGATIVE for worrisome rashes, moles or lesions  GI/: NEGATIVE for bowel or bladder changes  Neuro: NEGATIVE for weakness, dizziness or paresthesias    OBJECTIVE:  /88   Ht 1.727 m (5' 8\")   Wt 83.5 kg (184 lb)   BMI 27.98 kg/m      General: healthy, alert and in no distress  HEENT: no scleral icterus or conjunctival erythema  Skin: no suspicious lesions or rash. No jaundice.  CV: regular rhythm by palpation, no pedal edema  Resp: normal respiratory effort without conversational dyspnea   Psych: normal mood and affect  Gait: normal steady gait with appropriate coordination and balance  Neuro: normal light touch sensory exam of the extremities.    MSK:  RIGHT SHOULDER  Inspection:    no swelling, bruising, discoloration, or obvious deformity or asymmetry  Palpation:    Tender about the acromion, anterior capsule, proximal biceps tendon and greater tuberosity. Remainder of bony and tendinous landmarks are nontender.  Active Range of Motion:     Abduction normal0, FF normal0, ER normal0, IR normal.      Scapular dyskinesis absent  Strength:    Scapular plane abduction 5-/5,  ER 5-/5, IR 5/5, biceps 5/5, triceps 5/5  Special Tests:    Positive: Neer's, Cuadra', supraspinatus (empty can), Speeds, Claire's, and sulcus sign    Negative: drop arm/painful arc, crossed arm adduction, and Yergason's        Albert Yeo MD, [facility]          Again, thank you for allowing me to participate in the care of your patient.        Sincerely,        Albert Yeo, MD    "

## 2019-08-01 NOTE — PATIENT INSTRUCTIONS
1. Impingement syndrome of right shoulder    2. Biceps tendinitis of right upper extremity    3. Instability of right shoulder joint      -Patient is here for follow-up of right shoulder pain due to ligament laxity and instability causing impingement and tendinitis.  -Patient has had a flareup of pain due to increased demand at work and inability to continue with her therapy.  Patient will continue with therapy while she is still here before she goes away to school where she will continue with her home strengthening program.  -Patient had no pain improvement with diclofenac and so will try meloxicam 15 mg p.o. daily.  -Patient may follow-up when she returns back from school.  -Patient also has chronic TMJ pain and should follow up with Oral and Maxillofacial Surgery specialist for second opinion and further treatments  -Call direct clinic number [[phone]] at any time with questions or concerns.    Albert Yeo MD [facility]  [facility] [place]

## 2019-09-05 ENCOUNTER — TELEPHONE (OUTPATIENT)
Dept: PEDIATRICS | Facility: CLINIC | Age: 19
End: 2019-09-05

## 2019-09-05 NOTE — LETTER
September 19, 2019      Mireya Fritz  [address] [place] [address]        Dear Mireya,     You are receiving this message because Dr. Rodriguez would like to follow up with you on your mental health. These visits can either be done by phone, or as an office visit. To schedule this appointment, please call our clinic at the phone number listed in the top lefthand corner of this letter to schedule, or schedule it on your own via [employer].     In order to schedule a phone visit, you will need to check with your insurance to see if they cover these kinds of visits. If they do not, the charges for a phone visit will vary. The prices are as follows:     *The first ten minutes is $30   *11 to 20 minutes is $59   *21-30 minutes is $85     If you have any further questions or concerns, please do not hesitate to contact us either by phone, or simply responding to this message.     Thank you for choosing [facility],         Sincerely,        Dr. Rodriguez's Care Team

## 2019-09-05 NOTE — TELEPHONE ENCOUNTER
Panel Management Review      Patient has the following on her problem list:     Depression / Dysthymia review    Measure:  Needs PHQ-9 score of 4 or less during index window.  Administer PHQ-9 and if score is 5 or more, send encounter to provider for next steps.    5 - 7 month window range: within range, due for 4 week follow up in August.     PHQ-9 SCORE 12/19/2018 7/1/2019 7/1/2019   PHQ-9 Total Score MyChart - - 13 (Moderate depression)   PHQ-9 Total Score 17 14 13       If PHQ-9 recheck is 5 or more, route to provider for next steps.    Patient is due for:  Follow Up     Composite cancer screening  Chart review shows that this patient is due/due soon for the following None  Summary:    Patient is due/failing the following:   FOLLOW UP    Action needed:   Patient needs office visit for mental health follow up.    Type of outreach:    Sent [employer] message.    Questions for provider review:    None                                                                                                                                    Laurence Avery CMA     Chart routed to Care Team .

## 2019-10-03 ENCOUNTER — MYC MEDICAL ADVICE (OUTPATIENT)
Dept: PEDIATRICS | Facility: CLINIC | Age: 19
End: 2019-10-03

## 2019-10-03 NOTE — TELEPHONE ENCOUNTER
I prefer for these types of letters to come from mental health providers.  Please let patient know.    Lydia Rodriguez MD  Internal Medicine - Pediatrics

## 2019-10-03 NOTE — TELEPHONE ENCOUNTER
Dr Rodriguez: Patient requesting a letter from their provider saying they an have an emotional support animal at [facility]. Did you want that letter to come from you for from their mental health care provider? Thanks!  Rachel PORRAS RN, BSN

## 2019-10-20 ENCOUNTER — MYC MEDICAL ADVICE (OUTPATIENT)
Dept: PEDIATRICS | Facility: CLINIC | Age: 19
End: 2019-10-20

## 2019-10-21 NOTE — TELEPHONE ENCOUNTER
Dr. Rodriguez:    Please see patient's [employer] message. Okay to schedule for a telephone visit?      Thank you,   COLIN Oseguera

## 2019-10-21 NOTE — TELEPHONE ENCOUNTER
Yes, ok to schedule a telephone visit.  Please also update PHQ9 and GAD7.      Lydia Rodriguez MD  Internal Medicine - Pediatrics

## 2019-10-31 ENCOUNTER — VIRTUAL VISIT (OUTPATIENT)
Dept: PEDIATRICS | Facility: CLINIC | Age: 19
End: 2019-10-31
Payer: COMMERCIAL

## 2019-10-31 DIAGNOSIS — F32.1 MODERATE MAJOR DEPRESSION (H): Primary | ICD-10-CM

## 2019-10-31 DIAGNOSIS — G43.009 MIGRAINE WITHOUT AURA AND WITHOUT STATUS MIGRAINOSUS, NOT INTRACTABLE: ICD-10-CM

## 2019-10-31 DIAGNOSIS — F41.1 GAD (GENERALIZED ANXIETY DISORDER): ICD-10-CM

## 2019-10-31 PROCEDURE — 99442 ZZC PHYSICIAN TELEPHONE EVALUATION 11-20 MIN: CPT | Performed by: PEDIATRICS

## 2019-10-31 RX ORDER — RIZATRIPTAN BENZOATE 5 MG/1
5-10 TABLET ORAL
Qty: 18 TABLET | Refills: 3 | Status: SHIPPED | OUTPATIENT
Start: 2019-10-31 | End: 2019-12-17

## 2019-10-31 RX ORDER — BUPROPION HYDROCHLORIDE 150 MG/1
150 TABLET ORAL EVERY MORNING
Qty: 90 TABLET | Refills: 1 | Status: SHIPPED | OUTPATIENT
Start: 2019-10-31 | End: 2019-11-19

## 2019-10-31 NOTE — PROGRESS NOTES
"Mireya Fritz is a 18 year old female who is being evaluated via a billable telephone visit.      The patient has been notified of following:     \"This telephone visit will be conducted via a call between you and your physician/provider. We have found that certain health care needs can be provided without the need for a physical exam.  This service lets us provide the care you need with a short phone conversation.  If a prescription is necessary we can send it directly to your pharmacy.  If lab work is needed we can place an order for that and you can then stop by our lab to have the test done at a later time.    If during the course of the call the physician/provider feels a telephone visit is not appropriate, you will not be charged for this service.\"     Consent has been obtained for this service by 1 care team member: yes. See the scanned image in the medical record.    Telephone visit    Mireya Fritz complains of mood symptoms    Telephone visit today with patient to discuss recent physical and mental symptoms.  Recently she had submitted to me the lab work that it was performed at her college which showed normal thyroid function tests and some minor elevations in her fasting blood sugar and triglycerides.  These tests were pursued because she is been having some heat intolerance and fatigue.    After initial evaluation was normal, patient and her therapist have really feel that her physical symptoms are part of her depression which is been worsening.  She notes that her mood has been quite bad recently and a recent PHQ 9 with her therapist was 21.  She says that when she is sad, she has had some passive thoughts of suicidal ideation, but has had no active plan and has no current thoughts of suicidal ideation.  She is established herself with a therapist named Netta at her college, who she is seeing every week or every other week.  When she comes home, she follows with her longtime therapist, " Aminta, who she plans to see when she is home over winter break.    Currently patient is endorsing symptoms of sleeping too much and being fatigued all the time.  Her mood has been lower, but her anxiety has not been severe.  She does note that her appetite is increased.  She has had difficulty getting up for class and has been struggling slightly more this semester.    She has been taking her medications as prescribed.  She is been on Prozac long-term, and feels that may be an adjustment or addition needs to be made at this time.    For worsening mental health issues, her migraines have also been more severe recently.  She is out of Maxalt at school.    I have reviewed and updated the patient's Past Medical History, Social History, Family History and Medication List.    ALLERGIES  Patient has no known allergies.        Assessment/Plan:    ICD-10-CM    1. Moderate major depression (H) F32.1 buPROPion (WELLBUTRIN XL) 150 MG 24 hr tablet    Concern for worsening depression symptoms and recent new physical symptoms with depression as the likely underlying cause.  Plan to continue her current dose of Prozac, but add Wellbutrin.  We will also continue her long-term prescription of BuSpar.  Patient is to continue regular therapy with her therapist, both at school, and at home when she arrives home during break time.  She is to update me with how she is doing with a [employer] message in approximately 2 weeks time, sooner with any acute worsening or side effects from the medication.  New medication discussed in detail today.   2. DIAZ (generalized anxiety disorder) F41.1 buPROPion (WELLBUTRIN XL) 150 MG 24 hr tablet   3. Migraine without aura and without status migrainosus, not intractable G43.009 rizatriptan (MAXALT) 5 MG tablet    Migraines worsening with recent worsening depression, Maxalt prescription sent to patient at school.         I have reviewed the note as documented above.  This accurately captures the substance of  my conversation with the patient.      Total time of call between patient and provider was 11 minutes     Lydia Rodriguez MD

## 2019-11-03 ENCOUNTER — HEALTH MAINTENANCE LETTER (OUTPATIENT)
Age: 19
End: 2019-11-03

## 2019-11-19 ENCOUNTER — E-VISIT (OUTPATIENT)
Dept: PEDIATRICS | Facility: CLINIC | Age: 19
End: 2019-11-19
Payer: COMMERCIAL

## 2019-11-19 DIAGNOSIS — F41.1 GAD (GENERALIZED ANXIETY DISORDER): ICD-10-CM

## 2019-11-19 DIAGNOSIS — F32.1 MODERATE MAJOR DEPRESSION (H): ICD-10-CM

## 2019-11-19 PROCEDURE — 99444 ZZC PHYSICIAN ONLINE EVALUATION & MANAGEMENT SERVICE: CPT | Performed by: PEDIATRICS

## 2019-11-19 ASSESSMENT — ANXIETY QUESTIONNAIRES
GAD7 TOTAL SCORE: 13
7. FEELING AFRAID AS IF SOMETHING AWFUL MIGHT HAPPEN: MORE THAN HALF THE DAYS
GAD7 TOTAL SCORE: 13
7. FEELING AFRAID AS IF SOMETHING AWFUL MIGHT HAPPEN: MORE THAN HALF THE DAYS
5. BEING SO RESTLESS THAT IT IS HARD TO SIT STILL: SEVERAL DAYS
4. TROUBLE RELAXING: SEVERAL DAYS
GAD7 TOTAL SCORE: 13
2. NOT BEING ABLE TO STOP OR CONTROL WORRYING: MORE THAN HALF THE DAYS
1. FEELING NERVOUS, ANXIOUS, OR ON EDGE: NEARLY EVERY DAY
3. WORRYING TOO MUCH ABOUT DIFFERENT THINGS: NEARLY EVERY DAY
6. BECOMING EASILY ANNOYED OR IRRITABLE: SEVERAL DAYS

## 2019-11-19 ASSESSMENT — PATIENT HEALTH QUESTIONNAIRE - PHQ9
SUM OF ALL RESPONSES TO PHQ QUESTIONS 1-9: 18
10. IF YOU CHECKED OFF ANY PROBLEMS, HOW DIFFICULT HAVE THESE PROBLEMS MADE IT FOR YOU TO DO YOUR WORK, TAKE CARE OF THINGS AT HOME, OR GET ALONG WITH OTHER PEOPLE: VERY DIFFICULT
SUM OF ALL RESPONSES TO PHQ QUESTIONS 1-9: 18

## 2019-11-19 NOTE — TELEPHONE ENCOUNTER
Called patient, she states she does not have constant thoughts of being better off dead, only occasionally when she is sad.   She denies having suicidal thoughts or a plan or plan to harm others.   She feels safe. She sees a counselor at school regularly.     She states she has noticed more anxiety on the Wellbutrin which leads to sad thoughts. Not having sad thoughts as often as before.

## 2019-11-19 NOTE — TELEPHONE ENCOUNTER
Forwarded to triage nursing to call patient to review suicidal ideation endorsed on E visit.      Lydia Rodriguez MD  Internal Medicine - Pediatrics

## 2019-11-20 ASSESSMENT — ANXIETY QUESTIONNAIRES: GAD7 TOTAL SCORE: 13

## 2019-11-20 ASSESSMENT — PATIENT HEALTH QUESTIONNAIRE - PHQ9: SUM OF ALL RESPONSES TO PHQ QUESTIONS 1-9: 18

## 2019-12-17 ENCOUNTER — OFFICE VISIT (OUTPATIENT)
Dept: PEDIATRICS | Facility: CLINIC | Age: 19
End: 2019-12-17
Payer: COMMERCIAL

## 2019-12-17 VITALS
OXYGEN SATURATION: 97 % | SYSTOLIC BLOOD PRESSURE: 118 MMHG | WEIGHT: 191.4 LBS | HEIGHT: 68 IN | TEMPERATURE: 98 F | BODY MASS INDEX: 29.01 KG/M2 | HEART RATE: 93 BPM | DIASTOLIC BLOOD PRESSURE: 72 MMHG

## 2019-12-17 DIAGNOSIS — F32.1 MODERATE MAJOR DEPRESSION (H): Primary | ICD-10-CM

## 2019-12-17 DIAGNOSIS — M79.672 LEFT FOOT PAIN: ICD-10-CM

## 2019-12-17 DIAGNOSIS — F41.1 GAD (GENERALIZED ANXIETY DISORDER): ICD-10-CM

## 2019-12-17 DIAGNOSIS — Z23 NEED FOR MENINGITIS VACCINATION: ICD-10-CM

## 2019-12-17 DIAGNOSIS — Z23 NEED FOR PROPHYLACTIC VACCINATION AND INOCULATION AGAINST INFLUENZA: ICD-10-CM

## 2019-12-17 PROCEDURE — 90472 IMMUNIZATION ADMIN EACH ADD: CPT | Performed by: PEDIATRICS

## 2019-12-17 PROCEDURE — 90686 IIV4 VACC NO PRSV 0.5 ML IM: CPT | Performed by: PEDIATRICS

## 2019-12-17 PROCEDURE — 90471 IMMUNIZATION ADMIN: CPT | Performed by: PEDIATRICS

## 2019-12-17 PROCEDURE — 90620 MENB-4C VACCINE IM: CPT | Performed by: PEDIATRICS

## 2019-12-17 PROCEDURE — 96127 BRIEF EMOTIONAL/BEHAV ASSMT: CPT | Mod: 59 | Performed by: PEDIATRICS

## 2019-12-17 PROCEDURE — 99214 OFFICE O/P EST MOD 30 MIN: CPT | Mod: 25 | Performed by: PEDIATRICS

## 2019-12-17 ASSESSMENT — ANXIETY QUESTIONNAIRES
GAD7 TOTAL SCORE: 13
7. FEELING AFRAID AS IF SOMETHING AWFUL MIGHT HAPPEN: NEARLY EVERY DAY
GAD7 TOTAL SCORE: 13
1. FEELING NERVOUS, ANXIOUS, OR ON EDGE: MORE THAN HALF THE DAYS
6. BECOMING EASILY ANNOYED OR IRRITABLE: MORE THAN HALF THE DAYS
5. BEING SO RESTLESS THAT IT IS HARD TO SIT STILL: NOT AT ALL
GAD7 TOTAL SCORE: 13
4. TROUBLE RELAXING: NOT AT ALL
7. FEELING AFRAID AS IF SOMETHING AWFUL MIGHT HAPPEN: NEARLY EVERY DAY
3. WORRYING TOO MUCH ABOUT DIFFERENT THINGS: NEARLY EVERY DAY
2. NOT BEING ABLE TO STOP OR CONTROL WORRYING: NEARLY EVERY DAY

## 2019-12-17 ASSESSMENT — MIFFLIN-ST. JEOR: SCORE: 1686.68

## 2019-12-17 ASSESSMENT — PATIENT HEALTH QUESTIONNAIRE - PHQ9
SUM OF ALL RESPONSES TO PHQ QUESTIONS 1-9: 18
10. IF YOU CHECKED OFF ANY PROBLEMS, HOW DIFFICULT HAVE THESE PROBLEMS MADE IT FOR YOU TO DO YOUR WORK, TAKE CARE OF THINGS AT HOME, OR GET ALONG WITH OTHER PEOPLE: SOMEWHAT DIFFICULT
SUM OF ALL RESPONSES TO PHQ QUESTIONS 1-9: 18

## 2019-12-17 NOTE — PROGRESS NOTES
"Subjective     Mireya Fritz is a 19 year old female who presents to clinic today for the following health issues:    History of Present Illness        Mental Health Follow-up:  Patient presents to follow-up on Depression & Anxiety.Patient's depression since last visit has been:  Medium  The patient is having other symptoms associated with depression.  Patient's anxiety since last visit has been:  Medium  The patient is having other symptoms associated with anxiety.  Any significant life events: No  Patient is feeling anxious or having panic attacks.  Patient has no concerns about alcohol or drug use.     Social History  Tobacco Use    Smoking status: Current Some Day Smoker      Types: Other    Smokeless tobacco: Never Used  Alcohol use: No  Drug use: No      Today's PHQ-9         PHQ-9 Total Score:     (P) 18   PHQ-9 Q9 Thoughts of better off dead/self-harm past 2 weeks :   (P) More than half the days   Thoughts of suicide or self harm:  (P) Yes   Self-harm Plan:    (P) No   Self-harm Action:      (P) No   Safety concerns for self or others: (P) No         Migraines:   Since the patient's last clinic visit, headaches are: improved  The patient is getting headaches:  Probably at least once a week  She is not able to do normal daily activities when she has a migraine.  The patient is taking the following rescue/relief medications:  No rescue/relief medications, Ibuprofen (Advil, Motrin) and Excedrin   Patient states \"The relief is inconsistent\" from the rescue/relief medications.   The patient is taking the following medications to prevent migraines:  No medications to prevent migraines  In the past 4 weeks, the patient has gone to an Urgent Care or Emergency Room 0 times times due to headaches.    She eats 0-1 servings of fruits and vegetables daily.She consumes 1 sweetened beverage(s) daily.  She is taking medications regularly.       PHQ-9 SCORE 7/1/2019 11/19/2019 12/17/2019   PHQ-9 Total Score MyChart 13 " (Moderate depression) 18 (Moderately severe depression) 18 (Moderately severe depression)   PHQ-9 Total Score 13 18 18         Depression and Anxiety Follow-Up    How are you doing with your depression since your last visit? Improved , since phone convo with provider     How are you doing with your anxiety since your last visit?  No change    Are you having other symptoms that might be associated with depression or anxiety? No    Have you had a significant life event? No     Do you have any concerns with your use of alcohol or other drugs? No     Feels about 50% better since our last medication change - less sad, but more irritable.  Following with Aminta (long temr therapist) while home from college.  Anxiety better than before.      Migraines - triptan wasn't working.  Now using exedrin with good control.   Improved in frequency and doesn't want to add new medication at this time    Birth control - doing well with current medication.  Sexually active with one male partner.  No new concerns for STIs.    Working to cut down on vaping.  Has been using Juul.  No THC cartridges.    Social History     Tobacco Use     Smoking status: Current Some Day Smoker     Types: Other     Smokeless tobacco: Never Used   Substance Use Topics     Alcohol use: No     Drug use: No     PHQ 7/1/2019 11/19/2019 12/17/2019   PHQ-9 Total Score 13 18 18   Q9: Thoughts of better off dead/self-harm past 2 weeks Several days Several days More than half the days   F/U: Thoughts of suicide or self-harm No Yes Yes   F/U: Self harm-plan - No No   F/U: Self-harm action - No No   F/U: Safety concerns No No No     DIAZ-7 SCORE 7/1/2019 11/19/2019 12/17/2019   Total Score - 13 (moderate anxiety) 13 (moderate anxiety)   Total Score 14 13 13       Suicide Assessment Five-step Evaluation and Treatment (SAFE-T)      How many servings of fruits and vegetables do you eat daily?  0-1    On average, how many sweetened beverages do you drink each day (Examples:  "soda, juice, sweet tea, etc.  Do NOT count diet or artificially sweetened beverages)?   0    How many days per week do you miss taking your medication? 0      Has also been having persistent pain in the ball of the left foot.    Reviewed and updated as needed this visit by Provider         Review of Systems   ROS COMP: Constitutional, neuro, psych, gu systems are negative, except as otherwise noted.      Objective    /72 (BP Location: Right arm, Patient Position: Sitting, Cuff Size: Adult Regular)   Pulse 93   Temp 98  F (36.7  C) (Tympanic)   Ht 1.727 m (5' 8\")   Wt 86.8 kg (191 lb 6.4 oz)   SpO2 97%   BMI 29.10 kg/m    Body mass index is 29.1 kg/m .   Wt Readings from Last 4 Encounters:   12/17/19 86.8 kg (191 lb 6.4 oz)   08/01/19 83.5 kg (184 lb) (95 %)*   07/01/19 83.9 kg (184 lb 14.4 oz) (95 %)*   06/12/19 76.7 kg (169 lb) (92 %)*     * Growth percentiles are based on CDC (Girls, 2-20 Years) data.       Physical Exam   GENERAL: healthy, alert and no distress  PSYCH: mentation appears normal, affect normal/bright    Diagnostic Test Results:  pending        Assessment & Plan       ICD-10-CM    1. Moderate major depression (H) F32.1 Continue current high dose of prozac and monitor for the next few weeks while patient is home.  She will meet with her therapist 2-3 times while home and update me with how she is doing before she goes back to [facility].   If not improving further, will consider a switch to a different serotonin specific reuptake inhibitor or SNRI.   Patient to update me at any time with worsening.   2. DIAZ (generalized anxiety disorder) F41.1 See above   3. Left foot pain M79.672 PODIATRY/FOOT & ANKLE SURGERY REFERRAL   4. Need for prophylactic vaccination and inoculation against influenza Z23 INFLUENZA VACCINE IM > 6 MONTHS VALENT IIV4 [86145]     5. Need for meningitis vaccination Z23 MEN B, IM (10 - 25 YRS) - Bexsero          Tobacco Cessation:   reports that she has been smoking other. " She has never used smokeless tobacco.  Tobacco Cessation Action Plan: pt continuing to cut down on vaping        See Patient Instructions    Lydia Rodriguez MD  [facility] [place]      Answers for HPI/ROS submitted by the patient on 12/17/2019   Chronic problems general questions HPI Form  If you checked off any problems, how difficult have these problems made it for you to do your work, take care of things at home, or get along with other people?: Somewhat difficult  PHQ9 TOTAL SCORE: 18  IDAZ 7 TOTAL SCORE: 13

## 2019-12-17 NOTE — PATIENT INSTRUCTIONS
Send me an update on how you are doing with the current dose of prozac before you go back to school    Thank you for meeting with Aminta over break    Message me right away if you are feeling worse    Flu shot and bexsero today    Message me if headaches worsening and you want to try something new    Make a visit with podiatry - here or at [facility] [place]

## 2019-12-18 ASSESSMENT — PATIENT HEALTH QUESTIONNAIRE - PHQ9: SUM OF ALL RESPONSES TO PHQ QUESTIONS 1-9: 18

## 2019-12-18 ASSESSMENT — ANXIETY QUESTIONNAIRES: GAD7 TOTAL SCORE: 13

## 2020-02-08 ENCOUNTER — TRANSFERRED RECORDS (OUTPATIENT)
Dept: HEALTH INFORMATION MANAGEMENT | Facility: CLINIC | Age: 20
End: 2020-02-08

## 2020-04-01 ENCOUNTER — TELEPHONE (OUTPATIENT)
Dept: PEDIATRICS | Facility: CLINIC | Age: 20
End: 2020-04-01

## 2020-04-01 NOTE — TELEPHONE ENCOUNTER
LM to regarding 4/13 visit. Jennifer has availability today for phone visits. If that does not work please reschedule her 4/13 visit to a phone visit.    Fani Richardson MA on 4/1/2020 at 2:14 PM

## 2020-04-14 ENCOUNTER — VIRTUAL VISIT (OUTPATIENT)
Dept: PEDIATRICS | Facility: CLINIC | Age: 20
End: 2020-04-14
Payer: COMMERCIAL

## 2020-04-14 DIAGNOSIS — R68.89 HEAT INTOLERANCE: ICD-10-CM

## 2020-04-14 DIAGNOSIS — F32.1 MODERATE MAJOR DEPRESSION (H): ICD-10-CM

## 2020-04-14 DIAGNOSIS — F41.1 GAD (GENERALIZED ANXIETY DISORDER): Primary | ICD-10-CM

## 2020-04-14 PROCEDURE — 99213 OFFICE O/P EST LOW 20 MIN: CPT | Mod: 95 | Performed by: PEDIATRICS

## 2020-04-14 RX ORDER — VENLAFAXINE HYDROCHLORIDE 37.5 MG/1
CAPSULE, EXTENDED RELEASE ORAL
Qty: 120 CAPSULE | Refills: 1 | Status: SHIPPED | OUTPATIENT
Start: 2020-04-14 | End: 2020-07-14

## 2020-04-14 NOTE — PROGRESS NOTES
"Mireya Fritz is a 19 year old female who is being evaluated via a billable telephone visit.      The patient has been notified of following:     \"This telephone visit will be conducted via a call between you and your physician/provider. We have found that certain health care needs can be provided without the need for a physical exam.  This service lets us provide the care you need with a short phone conversation.  If a prescription is necessary we can send it directly to your pharmacy.  If lab work is needed we can place an order for that and you can then stop by our lab to have the test done at a later time.    Telephone visits are billed at different rates depending on your insurance coverage. During this emergency period, for some insurers they may be billed the same as an in-person visit.  Please reach out to your insurance provider with any questions.    If during the course of the call the physician/provider feels a telephone visit is not appropriate, you will not be charged for this service.\"    Patient has given verbal consent for Telephone visit?  Yes- DNS    How would you like to obtain your AVS? MyChart    Subjective     Mireya Fritz is a 19 year old female who presents to clinic today for the following health issues:    Hot Flashes       Duration: multiple times a day, no temp taken     Description (location/character/radiation): whole body    Intensity:  moderate    Accompanying signs and symptoms: no other symptoms    History (similar episodes/previous evaluation): Always feel like she has been a hot person, having more intense hot flashes since Aug     Precipitating or alleviating factors: cold packs, fan, outside    Therapies tried and outcome: Seen by school nurse, thyroid checked, normal, nothing else       Telephone visit conducted in place of in person visit due to the ongoing coronavirus pandemic.    Patient is now home from [employer] and is conducting online learning.  She had " been working at the [employer], so she is not working at present.    Patient with a long history of depression and anxiety with multiple medication adjustments over the last several months.  She notes that her depression and anxiety are currently in a good place and that her stress level is actually decreased now that she is away from school and drama surrounding living with her friends and having to be with him all the time.  She has not yet reached out to her longtime therapist since she is been home.    Over the past year, patient has been really struggling with feeling hot all the time.  She has seen the nurse at school, we have discussed that here, she has had normal thyroid function testing.  She is always been warm in general, but has found that this is gotten significantly worse to the point where she cannot even tolerate to wear long sleeves.  She gets sweaty and is overheated after she exercises.    Of note, we have increased her Prozac over time so that she is now taking 80 mg daily.    Patient is interested in adjusting her antidepressant at this time as we are most suspicious that her Prozac is causing this temperature intolerance.        Reviewed and updated as needed this visit by Provider                        Assessment/Plan:        ICD-10-CM    1. DIAZ (generalized anxiety disorder)  F41.1 venlafaxine (EFFEXOR XR) 37.5 MG 24 hr capsule   2. Moderate major depression (H)  F32.1 venlafaxine (EFFEXOR XR) 37.5 MG 24 hr capsule   3. Heat intolerance  R68.89      We discussed today that I suspect that patient's persistent heat intolerance is related to her fluoxetine.  We will plan to wean her fluoxetine to off at the same time as we start Effexor and titrated upward.  Specific instructions were provided to the patient today.    I recommended that she start dropping her Prozac dose to 40 mg daily x1 week, then 20 mg daily x1 week, then stop.    At the same time, she can start increasing her dose of  Effexor, starting with 37.5 mg or 1 capsule, daily x1 week, then 2 capsules daily x1 week, then 3 capsules daily x1 week, then 4 capsules daily for a total dose of 150 mg daily.    Once patient is reached the 150 mg dosing, we should have a repeat virtual visit.    Encourage patient to have her mother and sister, who are home with her at this time watch her closely during this period of medication transition.  I also encouraged her to reach out to her longtime therapist, Aminta.    Phone call duration:  11 minutes    Lydia Rodriguez MD

## 2020-04-22 ENCOUNTER — TRANSFERRED RECORDS (OUTPATIENT)
Dept: HEALTH INFORMATION MANAGEMENT | Facility: CLINIC | Age: 20
End: 2020-04-22

## 2020-05-26 DIAGNOSIS — F41.1 GAD (GENERALIZED ANXIETY DISORDER): ICD-10-CM

## 2020-05-27 RX ORDER — BUSPIRONE HYDROCHLORIDE 15 MG/1
TABLET ORAL
Qty: 180 TABLET | Refills: 1 | Status: SHIPPED | OUTPATIENT
Start: 2020-05-27 | End: 2020-08-11 | Stop reason: ALTCHOICE

## 2020-05-27 NOTE — TELEPHONE ENCOUNTER
Routing refill request to provider for review/approval because:  Labs out of range:  DIAZ    DIAZ-7 SCORE 7/1/2019 11/19/2019 12/17/2019   Total Score - 13 (moderate anxiety) 13 (moderate anxiety)   Total Score 14 13 13     Please advise on refill.    Padmini CARDOZA RN, BSN

## 2020-05-31 ENCOUNTER — MYC REFILL (OUTPATIENT)
Dept: PEDIATRICS | Facility: CLINIC | Age: 20
End: 2020-05-31

## 2020-05-31 DIAGNOSIS — F41.1 GAD (GENERALIZED ANXIETY DISORDER): ICD-10-CM

## 2020-05-31 RX ORDER — BUSPIRONE HYDROCHLORIDE 15 MG/1
15 TABLET ORAL 2 TIMES DAILY
Qty: 180 TABLET | Refills: 1 | Status: CANCELLED | OUTPATIENT
Start: 2020-05-31

## 2020-06-24 ENCOUNTER — TRANSFERRED RECORDS (OUTPATIENT)
Dept: HEALTH INFORMATION MANAGEMENT | Facility: CLINIC | Age: 20
End: 2020-06-24

## 2020-06-25 DIAGNOSIS — F41.1 GAD (GENERALIZED ANXIETY DISORDER): ICD-10-CM

## 2020-06-25 DIAGNOSIS — F32.1 MODERATE MAJOR DEPRESSION (H): ICD-10-CM

## 2020-06-26 RX ORDER — VENLAFAXINE HYDROCHLORIDE 37.5 MG/1
CAPSULE, EXTENDED RELEASE ORAL
Qty: 120 CAPSULE | Refills: 1 | OUTPATIENT
Start: 2020-06-26

## 2020-06-26 RX ORDER — VENLAFAXINE HYDROCHLORIDE 150 MG/1
150 CAPSULE, EXTENDED RELEASE ORAL DAILY
Qty: 90 CAPSULE | Refills: 1 | Status: SHIPPED | OUTPATIENT
Start: 2020-06-26 | End: 2020-08-17

## 2020-06-26 NOTE — TELEPHONE ENCOUNTER
Routing refill request to provider for review/approval because:  Labs not current:  Needs PHQ and creatinine    Janet Hardwick RN

## 2020-06-30 ENCOUNTER — MYC MEDICAL ADVICE (OUTPATIENT)
Dept: PEDIATRICS | Facility: CLINIC | Age: 20
End: 2020-06-30

## 2020-07-02 NOTE — TELEPHONE ENCOUNTER
Mireya Fritz   to Me       3:11 PM   I am still taking birth control yes, that s what that is right?                  Padmini Palm, RN   [facility] ([facility])       Please call patient to ask if she is still taking this medication and route back to refill pool with answer. Medication not active on medication list.     Padmini CARDOZA RN, BSN           Raine Moody, MA// July 2, 2020 4:52 PM

## 2020-07-09 ENCOUNTER — TELEPHONE (OUTPATIENT)
Dept: PEDIATRICS | Facility: CLINIC | Age: 20
End: 2020-07-09

## 2020-07-09 DIAGNOSIS — R68.89 HEAT INTOLERANCE: Primary | ICD-10-CM

## 2020-07-09 DIAGNOSIS — R61 SWEATING PROFUSELY: ICD-10-CM

## 2020-07-09 NOTE — TELEPHONE ENCOUNTER
Patient called in stating that she is coming in to have labs drawn today, needs labs entered.     Routing to PCP to place orders.    Laurence Avery, CMA

## 2020-07-10 DIAGNOSIS — R61 SWEATING PROFUSELY: ICD-10-CM

## 2020-07-10 DIAGNOSIS — R68.89 HEAT INTOLERANCE: ICD-10-CM

## 2020-07-10 LAB
BASOPHILS # BLD AUTO: 0 10E9/L (ref 0–0.2)
BASOPHILS NFR BLD AUTO: 0.3 %
DIFFERENTIAL METHOD BLD: NORMAL
EOSINOPHIL # BLD AUTO: 0.2 10E9/L (ref 0–0.7)
EOSINOPHIL NFR BLD AUTO: 1.5 %
ERYTHROCYTE [DISTWIDTH] IN BLOOD BY AUTOMATED COUNT: 12.3 % (ref 10–15)
HCT VFR BLD AUTO: 44.4 % (ref 35–47)
HGB BLD-MCNC: 14.8 G/DL (ref 11.7–15.7)
LYMPHOCYTES # BLD AUTO: 4 10E9/L (ref 0.8–5.3)
LYMPHOCYTES NFR BLD AUTO: 39.1 %
MCH RBC QN AUTO: 29.4 PG (ref 26.5–33)
MCHC RBC AUTO-ENTMCNC: 33.3 G/DL (ref 31.5–36.5)
MCV RBC AUTO: 88 FL (ref 78–100)
MONOCYTES # BLD AUTO: 0.8 10E9/L (ref 0–1.3)
MONOCYTES NFR BLD AUTO: 7.7 %
NEUTROPHILS # BLD AUTO: 5.3 10E9/L (ref 1.6–8.3)
NEUTROPHILS NFR BLD AUTO: 51.4 %
PLATELET # BLD AUTO: 298 10E9/L (ref 150–450)
RBC # BLD AUTO: 5.04 10E12/L (ref 3.8–5.2)
T3FREE SERPL-MCNC: 2.6 PG/ML (ref 2.3–4.2)
WBC # BLD AUTO: 10.2 10E9/L (ref 4–11)

## 2020-07-10 PROCEDURE — 80050 GENERAL HEALTH PANEL: CPT | Performed by: PEDIATRICS

## 2020-07-10 PROCEDURE — 84481 FREE ASSAY (FT-3): CPT | Performed by: PEDIATRICS

## 2020-07-10 PROCEDURE — 84439 ASSAY OF FREE THYROXINE: CPT | Performed by: PEDIATRICS

## 2020-07-10 PROCEDURE — 36415 COLL VENOUS BLD VENIPUNCTURE: CPT | Performed by: PEDIATRICS

## 2020-07-11 LAB
ALBUMIN SERPL-MCNC: 3.8 G/DL (ref 3.4–5)
ALP SERPL-CCNC: 55 U/L (ref 40–150)
ALT SERPL W P-5'-P-CCNC: 35 U/L (ref 0–50)
ANION GAP SERPL CALCULATED.3IONS-SCNC: 8 MMOL/L (ref 3–14)
AST SERPL W P-5'-P-CCNC: 20 U/L (ref 0–45)
BILIRUB SERPL-MCNC: 0.3 MG/DL (ref 0.2–1.3)
BUN SERPL-MCNC: 15 MG/DL (ref 7–30)
CALCIUM SERPL-MCNC: 8.6 MG/DL (ref 8.5–10.1)
CHLORIDE SERPL-SCNC: 100 MMOL/L (ref 94–109)
CO2 SERPL-SCNC: 27 MMOL/L (ref 20–32)
CREAT SERPL-MCNC: 0.71 MG/DL (ref 0.52–1.04)
GFR SERPL CREATININE-BSD FRML MDRD: >90 ML/MIN/{1.73_M2}
GLUCOSE SERPL-MCNC: 84 MG/DL (ref 70–99)
POTASSIUM SERPL-SCNC: 3.6 MMOL/L (ref 3.4–5.3)
PROT SERPL-MCNC: 8.1 G/DL (ref 6.8–8.8)
SODIUM SERPL-SCNC: 135 MMOL/L (ref 133–144)
T4 FREE SERPL-MCNC: 1.18 NG/DL (ref 0.76–1.46)
TSH SERPL DL<=0.005 MIU/L-ACNC: 2.62 MU/L (ref 0.4–4)

## 2020-07-16 DIAGNOSIS — R61 NIGHT SWEATS: ICD-10-CM

## 2020-07-16 LAB
CRP SERPL-MCNC: 41 MG/L (ref 0–8)
ERYTHROCYTE [SEDIMENTATION RATE] IN BLOOD BY WESTERGREN METHOD: 10 MM/H (ref 0–20)

## 2020-07-16 PROCEDURE — 85652 RBC SED RATE AUTOMATED: CPT | Performed by: PEDIATRICS

## 2020-07-16 PROCEDURE — 36415 COLL VENOUS BLD VENIPUNCTURE: CPT | Performed by: PEDIATRICS

## 2020-07-16 PROCEDURE — 86140 C-REACTIVE PROTEIN: CPT | Performed by: PEDIATRICS

## 2020-07-16 PROCEDURE — 87040 BLOOD CULTURE FOR BACTERIA: CPT | Performed by: PEDIATRICS

## 2020-07-16 PROCEDURE — 86481 TB AG RESPONSE T-CELL SUSP: CPT | Performed by: PEDIATRICS

## 2020-07-16 PROCEDURE — 87389 HIV-1 AG W/HIV-1&-2 AB AG IA: CPT | Performed by: PEDIATRICS

## 2020-07-17 LAB — HIV 1+2 AB+HIV1 P24 AG SERPL QL IA: NONREACTIVE

## 2020-07-19 LAB
GAMMA INTERFERON BACKGROUND BLD IA-ACNC: 0.01 IU/ML
M TB IFN-G CD4+ BCKGRND COR BLD-ACNC: 9.99 IU/ML
M TB TUBERC IFN-G BLD QL: NEGATIVE
MITOGEN IGNF BCKGRD COR BLD-ACNC: 0.03 IU/ML
MITOGEN IGNF BCKGRD COR BLD-ACNC: 0.12 IU/ML

## 2020-07-22 LAB
BACTERIA SPEC CULT: NO GROWTH
SPECIMEN SOURCE: NORMAL

## 2020-08-11 ENCOUNTER — OFFICE VISIT (OUTPATIENT)
Dept: PEDIATRICS | Facility: CLINIC | Age: 20
End: 2020-08-11
Payer: COMMERCIAL

## 2020-08-11 VITALS
SYSTOLIC BLOOD PRESSURE: 136 MMHG | DIASTOLIC BLOOD PRESSURE: 90 MMHG | BODY MASS INDEX: 32.8 KG/M2 | TEMPERATURE: 98.4 F | HEART RATE: 110 BPM | HEIGHT: 68 IN | OXYGEN SATURATION: 97 % | WEIGHT: 216.4 LBS

## 2020-08-11 DIAGNOSIS — N93.0 POSTCOITAL BLEEDING: Primary | ICD-10-CM

## 2020-08-11 DIAGNOSIS — Z11.3 ROUTINE SCREENING FOR STI (SEXUALLY TRANSMITTED INFECTION): ICD-10-CM

## 2020-08-11 DIAGNOSIS — N89.8 VAGINAL DISCHARGE: ICD-10-CM

## 2020-08-11 LAB
SPECIMEN SOURCE: NORMAL
WET PREP SPEC: NORMAL

## 2020-08-11 PROCEDURE — 99213 OFFICE O/P EST LOW 20 MIN: CPT | Performed by: NURSE PRACTITIONER

## 2020-08-11 PROCEDURE — 87210 SMEAR WET MOUNT SALINE/INK: CPT | Performed by: NURSE PRACTITIONER

## 2020-08-11 PROCEDURE — 87491 CHLMYD TRACH DNA AMP PROBE: CPT | Performed by: NURSE PRACTITIONER

## 2020-08-11 PROCEDURE — 87591 N.GONORRHOEAE DNA AMP PROB: CPT | Performed by: NURSE PRACTITIONER

## 2020-08-11 ASSESSMENT — MIFFLIN-ST. JEOR: SCORE: 1800.08

## 2020-08-11 NOTE — PATIENT INSTRUCTIONS
1.  I will f/u with your results via [employer] when they are back  2.  Please decide if you'd like to be referred to OBGYN

## 2020-08-11 NOTE — PROGRESS NOTES
Subjective     Mireya Fritz is a 19 year old female who presents to clinic today for the following health issues:    HPI       Vaginal Symptoms      Duration: 12+ months     Description  Light spotting after intercourse.  Happens almost every time patient has intercourse x1 weekly. Male partner, 1.  Does not use condoms.    Intensity:  mild    Accompanying signs and symptoms (fever/dysuria/abdominal or back pain): None-  Pt reported noticeable when wiping, no other pain,  Does have vaginal discharge ( white/creamy) no odor currently.    History  Sexually active: yes, single partner, contraception - oral contraceptives (combined)  Possibility of pregnancy: No  Recent antibiotic use: no     Precipitating or alleviating factors: None    Therapies tried and outcome: none        Patient Active Problem List   Diagnosis     Right shoulder pain     Depression, unspecified depression type     DIAZ (generalized anxiety disorder)     Dysmenorrhea     Migraine without aura and without status migrainosus, not intractable     Cervical pain     Biceps tendinitis of right upper extremity     Moderate major depression (H)     Past Surgical History:   Procedure Laterality Date     NO HISTORY OF SURGERY         Social History     Tobacco Use     Smoking status: Current Every Day Smoker     Types: Other, Vaping Device     Smokeless tobacco: Never Used     Tobacco comment: e- cig    Substance Use Topics     Alcohol use: Yes     Family History   Problem Relation Age of Onset     Rheumatologic Disease Mother         ankylosing spondylitis     Breast Cancer Other         maternal great grandmother     Cancer - colorectal No family hx of          Current Outpatient Medications   Medication Sig Dispense Refill     busPIRone HCl (BUSPAR) 30 MG tablet Take 1 tablet (30 mg) by mouth 2 times daily 180 tablet 3     norethindrone-ethinyl estradiol (NORTREL 1/35, 28,) 1-35 MG-MCG tablet Take 1 tablet by mouth daily 84 tablet 3     venlafaxine  "(EFFEXOR XR) 150 MG 24 hr capsule Take 1 capsule (150 mg) by mouth daily 90 capsule 1     No Known Allergies    Reviewed and updated as needed this visit by Provider  Tobacco  Allergies  Meds         Review of Systems   Constitutional, HEENT, cardiovascular, pulmonary, gi and gu systems are negative, except as otherwise noted.      Objective    Pulse 110   Ht 1.727 m (5' 8\")   Wt 98.2 kg (216 lb 6.4 oz)   SpO2 97%   BMI 32.90 kg/m    Body mass index is 32.9 kg/m .       Physical Exam   GENERAL: healthy, alert and no distress  RESP: lungs clear to auscultation - no rales, rhonchi or wheezes  CV: regular rate and rhythm, normal S1 S2, no S3 or S4, no murmur, click or rub, no peripheral edema and peripheral pulses strong   (female): normal female external genitalia, normal urethral meatus, vaginal mucosa, normal cervix/adnexa/uterus without masses; white thick discharge present.  PSYCH: mentation appears normal, affect normal/bright    Diagnostic Test Results:  Labs reviewed in Epic        Assessment & Plan     1. Postcoital bleeding  - Wet prep  - NEISSERIA GONORRHOEA PCR  - CHLAMYDIA TRACHOMATIS PCR    2. Vaginal discharge  - Wet prep    3. Routine screening for STI (sexually transmitted infection)      Discussed causes of post coital bleeding both non-infectious and infectious.  Discussed possible malignant causes.  Patient to try to use more lubricant during intercourse.  Will r/o STI today and test for abnormal discharge.    Will refer to OBGYN if needed, patient prefers to wait.       BMI:   Estimated body mass index is 32.9 kg/m  as calculated from the following:    Height as of this encounter: 1.727 m (5' 8\").    Weight as of this encounter: 98.2 kg (216 lb 6.4 oz).         See Patient Instructions    Return in about 3 months (around 11/11/2020) for In-Clinic Visit.    Vida Golden NP  [facility] [place]  "

## 2020-08-13 ENCOUNTER — MYC MEDICAL ADVICE (OUTPATIENT)
Dept: PEDIATRICS | Facility: CLINIC | Age: 20
End: 2020-08-13

## 2020-08-13 ENCOUNTER — TRANSFERRED RECORDS (OUTPATIENT)
Dept: HEALTH INFORMATION MANAGEMENT | Facility: CLINIC | Age: 20
End: 2020-08-13

## 2020-08-13 DIAGNOSIS — F32.1 MODERATE MAJOR DEPRESSION (H): ICD-10-CM

## 2020-08-13 DIAGNOSIS — F41.1 GAD (GENERALIZED ANXIETY DISORDER): Primary | ICD-10-CM

## 2020-08-17 ENCOUNTER — TELEPHONE (OUTPATIENT)
Dept: PEDIATRICS | Facility: CLINIC | Age: 20
End: 2020-08-17

## 2020-08-17 RX ORDER — VENLAFAXINE HYDROCHLORIDE 37.5 MG/1
CAPSULE, EXTENDED RELEASE ORAL
Qty: 42 CAPSULE | Refills: 0 | Status: SHIPPED | OUTPATIENT
Start: 2020-08-17 | End: 2020-09-20

## 2020-08-17 RX ORDER — BUPROPION HYDROCHLORIDE 150 MG/1
150 TABLET ORAL EVERY MORNING
Qty: 90 TABLET | Refills: 1 | Status: SHIPPED | OUTPATIENT
Start: 2020-08-17 | End: 2020-09-21

## 2020-08-19 ENCOUNTER — MYC MEDICAL ADVICE (OUTPATIENT)
Dept: PEDIATRICS | Facility: CLINIC | Age: 20
End: 2020-08-19

## 2020-08-19 NOTE — LETTER
[facility]  [address]  [address]  [place] [address] [phone]          August 20, 2020    RE:  Mireya Fritz                                                                                                                                                       [id number] [place] [address]            To whom it may concern:    Mireya Fritz is under my professional care.    I recommend that she be allowed to keep her cat with her as an emotional support animal as part of her comprehensive medical treatment plan for severe anxiety and depression.      Sincerely,        Lydia Rodriguez MD

## 2020-08-20 NOTE — TELEPHONE ENCOUNTER
Letter completed and placed in out basket.   Please arrange  or fax for patient.    Thanks!    Lydia Rodriguez MD  Internal Medicine - Pediatrics

## 2020-08-24 ENCOUNTER — MYC MEDICAL ADVICE (OUTPATIENT)
Dept: PEDIATRICS | Facility: CLINIC | Age: 20
End: 2020-08-24

## 2020-08-24 NOTE — TELEPHONE ENCOUNTER
Called patient and LVM. Assuming patient printed letter through [employer], closing encounter at this time.     Laurence Avery, CMA

## 2020-08-24 NOTE — TELEPHONE ENCOUNTER
Pt called back and message been relayed to. Pt requested the letter be sent to her college address below.  [address]  [address]  [place] [address]  Any questions please reach out to pt at [phone]    Richy Ching on 8/24/2020 at 4:49 PM

## 2020-08-29 ENCOUNTER — MYC REFILL (OUTPATIENT)
Dept: PEDIATRICS | Facility: CLINIC | Age: 20
End: 2020-08-29

## 2020-08-29 DIAGNOSIS — N92.6 IRREGULAR MENSES: ICD-10-CM

## 2020-08-29 DIAGNOSIS — N94.6 DYSMENORRHEA: ICD-10-CM

## 2020-08-31 RX ORDER — NORETHINDRONE AND ETHINYL ESTRADIOL 1 MG-35MCG
KIT ORAL
Qty: 84 TABLET | Refills: 2 | OUTPATIENT
Start: 2020-08-31

## 2020-08-31 RX ORDER — NORETHINDRONE AND ETHINYL ESTRADIOL 1 MG-35MCG
1 KIT ORAL DAILY
Qty: 84 TABLET | Refills: 3 | Status: SHIPPED | OUTPATIENT
Start: 2020-08-31 | End: 2020-10-19

## 2020-08-31 NOTE — TELEPHONE ENCOUNTER
Patient called in and stated she has only one day left. Routing hig priority.     Danica Ramires, EMT at 11:15 AM on August 31, 2020  [facility] Health Guide   [phone]

## 2020-08-31 NOTE — TELEPHONE ENCOUNTER
Routing refill request to provider for review/approval because:  Elevated BP    Sybil Zurita RN   [facility] -- Triage Nurse

## 2020-08-31 NOTE — TELEPHONE ENCOUNTER
Duplicate please refuse and close encounter.     Danica Ramires, EMT at 11:16 AM on August 31, 2020  [facility] Health Guide   [phone]

## 2020-09-14 ENCOUNTER — MYC MEDICAL ADVICE (OUTPATIENT)
Dept: PEDIATRICS | Facility: CLINIC | Age: 20
End: 2020-09-14

## 2020-09-14 NOTE — TELEPHONE ENCOUNTER
Please see [employer] message.    Danica Ramires, EMT at 10:06 AM on September 14, 2020  [facility] Health Guide   [phone]

## 2020-09-19 ENCOUNTER — E-VISIT (OUTPATIENT)
Dept: PEDIATRICS | Facility: CLINIC | Age: 20
End: 2020-09-19
Payer: COMMERCIAL

## 2020-09-19 DIAGNOSIS — F41.1 GAD (GENERALIZED ANXIETY DISORDER): ICD-10-CM

## 2020-09-19 DIAGNOSIS — F32.1 MODERATE MAJOR DEPRESSION (H): Primary | ICD-10-CM

## 2020-09-19 DIAGNOSIS — G43.009 MIGRAINE WITHOUT AURA AND WITHOUT STATUS MIGRAINOSUS, NOT INTRACTABLE: ICD-10-CM

## 2020-09-19 PROCEDURE — 99422 OL DIG E/M SVC 11-20 MIN: CPT | Performed by: PEDIATRICS

## 2020-09-19 ASSESSMENT — ANXIETY QUESTIONNAIRES
GAD7 TOTAL SCORE: 20
1. FEELING NERVOUS, ANXIOUS, OR ON EDGE: NEARLY EVERY DAY
3. WORRYING TOO MUCH ABOUT DIFFERENT THINGS: NEARLY EVERY DAY
5. BEING SO RESTLESS THAT IT IS HARD TO SIT STILL: MORE THAN HALF THE DAYS
7. FEELING AFRAID AS IF SOMETHING AWFUL MIGHT HAPPEN: NEARLY EVERY DAY
6. BECOMING EASILY ANNOYED OR IRRITABLE: NEARLY EVERY DAY
2. NOT BEING ABLE TO STOP OR CONTROL WORRYING: NEARLY EVERY DAY
4. TROUBLE RELAXING: NEARLY EVERY DAY
7. FEELING AFRAID AS IF SOMETHING AWFUL MIGHT HAPPEN: NEARLY EVERY DAY

## 2020-09-19 ASSESSMENT — PATIENT HEALTH QUESTIONNAIRE - PHQ9
10. IF YOU CHECKED OFF ANY PROBLEMS, HOW DIFFICULT HAVE THESE PROBLEMS MADE IT FOR YOU TO DO YOUR WORK, TAKE CARE OF THINGS AT HOME, OR GET ALONG WITH OTHER PEOPLE: VERY DIFFICULT
SUM OF ALL RESPONSES TO PHQ QUESTIONS 1-9: 18
SUM OF ALL RESPONSES TO PHQ QUESTIONS 1-9: 18

## 2020-09-20 ASSESSMENT — ANXIETY QUESTIONNAIRES: GAD7 TOTAL SCORE: 20

## 2020-09-20 ASSESSMENT — PATIENT HEALTH QUESTIONNAIRE - PHQ9: SUM OF ALL RESPONSES TO PHQ QUESTIONS 1-9: 18

## 2020-09-20 NOTE — TELEPHONE ENCOUNTER
Provider E-Visit time total (minutes): 11    PHQ 11/19/2019 12/17/2019 9/19/2020   PHQ-9 Total Score 18 18 18   Q9: Thoughts of better off dead/self-harm past 2 weeks Several days More than half the days Several days   F/U: Thoughts of suicide or self-harm Yes Yes Yes   F/U: Self harm-plan No No No   F/U: Self-harm action No No No   F/U: Safety concerns No No No     DIAZ-7 SCORE 11/19/2019 12/17/2019 9/19/2020   Total Score 13 (moderate anxiety) 13 (moderate anxiety) 20 (severe anxiety)   Total Score 13 13 20         Lydia Rodriguez MD  Internal Medicine - Pediatrics

## 2020-09-21 RX ORDER — BUPROPION HYDROCHLORIDE 300 MG/1
300 TABLET ORAL EVERY MORNING
Qty: 30 TABLET | Refills: 5 | Status: SHIPPED | OUTPATIENT
Start: 2020-09-21 | End: 2020-10-19

## 2020-09-21 RX ORDER — BUSPIRONE HYDROCHLORIDE 30 MG/1
30 TABLET ORAL 2 TIMES DAILY
Qty: 180 TABLET | Refills: 3 | Status: SHIPPED | OUTPATIENT
Start: 2020-09-21 | End: 2021-09-16

## 2020-10-12 ENCOUNTER — MYC MEDICAL ADVICE (OUTPATIENT)
Dept: PEDIATRICS | Facility: CLINIC | Age: 20
End: 2020-10-12

## 2020-10-12 NOTE — TELEPHONE ENCOUNTER
Dr. Rodriguez-   Please see [employer] message. What are your recommendations? Should she do another E-visit? Virtual visit? Natalia Hebert RN on 10/12/2020 at 3:51 PM

## 2020-10-12 NOTE — TELEPHONE ENCOUNTER
Called patient and scheduled her for a phone visit on the 19th.     No other questions or concerns.     Danica Ramires, EMT at 4:28 PM on October 12, 2020  [facility] Health Guide   [phone]

## 2020-10-12 NOTE — TELEPHONE ENCOUNTER
Please see [employer] message.     Daniac Ramires, EMT at 11:38 AM on October 12, 2020  [facility] Health Guide   [phone]

## 2020-10-12 NOTE — TELEPHONE ENCOUNTER
Can I use a virtual RADHA on the 19th or 26th of October?    Danica Ramires, EMT at 4:04 PM on October 12, 2020  [facility] Health Guide   [phone]

## 2020-10-12 NOTE — TELEPHONE ENCOUNTER
Please have her schedule a virtual visit or schedule with Vida Golden in the next 2 weeks.      Lydia Rodriguez MD  Internal Medicine - Pediatrics

## 2020-10-19 ENCOUNTER — VIRTUAL VISIT (OUTPATIENT)
Dept: PEDIATRICS | Facility: CLINIC | Age: 20
End: 2020-10-19
Payer: COMMERCIAL

## 2020-10-19 DIAGNOSIS — N94.6 DYSMENORRHEA: ICD-10-CM

## 2020-10-19 DIAGNOSIS — F41.1 GAD (GENERALIZED ANXIETY DISORDER): Primary | ICD-10-CM

## 2020-10-19 DIAGNOSIS — F32.1 MODERATE MAJOR DEPRESSION (H): ICD-10-CM

## 2020-10-19 PROCEDURE — 96127 BRIEF EMOTIONAL/BEHAV ASSMT: CPT | Performed by: PEDIATRICS

## 2020-10-19 PROCEDURE — 99214 OFFICE O/P EST MOD 30 MIN: CPT | Mod: 95 | Performed by: PEDIATRICS

## 2020-10-19 RX ORDER — NORETHINDRONE ACETATE AND ETHINYL ESTRADIOL 1MG-20(21)
1 KIT ORAL DAILY
Qty: 84 TABLET | Refills: 3 | Status: SHIPPED | OUTPATIENT
Start: 2020-10-19 | End: 2021-09-15

## 2020-10-19 RX ORDER — BUPROPION HYDROCHLORIDE 150 MG/1
150 TABLET ORAL EVERY MORNING
Qty: 90 TABLET | Refills: 1 | Status: SHIPPED | OUTPATIENT
Start: 2020-10-19 | End: 2021-01-20

## 2020-10-19 ASSESSMENT — ANXIETY QUESTIONNAIRES
7. FEELING AFRAID AS IF SOMETHING AWFUL MIGHT HAPPEN: NEARLY EVERY DAY
1. FEELING NERVOUS, ANXIOUS, OR ON EDGE: NEARLY EVERY DAY
3. WORRYING TOO MUCH ABOUT DIFFERENT THINGS: NEARLY EVERY DAY
IF YOU CHECKED OFF ANY PROBLEMS ON THIS QUESTIONNAIRE, HOW DIFFICULT HAVE THESE PROBLEMS MADE IT FOR YOU TO DO YOUR WORK, TAKE CARE OF THINGS AT HOME, OR GET ALONG WITH OTHER PEOPLE: SOMEWHAT DIFFICULT
6. BECOMING EASILY ANNOYED OR IRRITABLE: NEARLY EVERY DAY
GAD7 TOTAL SCORE: 19
2. NOT BEING ABLE TO STOP OR CONTROL WORRYING: NEARLY EVERY DAY
5. BEING SO RESTLESS THAT IT IS HARD TO SIT STILL: SEVERAL DAYS

## 2020-10-19 ASSESSMENT — PATIENT HEALTH QUESTIONNAIRE - PHQ9
5. POOR APPETITE OR OVEREATING: NEARLY EVERY DAY
SUM OF ALL RESPONSES TO PHQ QUESTIONS 1-9: 13

## 2020-10-19 NOTE — PROGRESS NOTES
"Mireya Fritz is a 19 year old female who is being evaluated via a billable telephone visit.      The patient has been notified of following:     \"This telephone visit will be conducted via a call between you and your physician/provider. We have found that certain health care needs can be provided without the need for a physical exam.  This service lets us provide the care you need with a short phone conversation.  If a prescription is necessary we can send it directly to your pharmacy.  If lab work is needed we can place an order for that and you can then stop by our lab to have the test done at a later time.    Telephone visits are billed at different rates depending on your insurance coverage. During this emergency period, for some insurers they may be billed the same as an in-person visit.  Please reach out to your insurance provider with any questions.    If during the course of the call the physician/provider feels a telephone visit is not appropriate, you will not be charged for this service.\"    Patient has given verbal consent for Telephone visit?  Yes    What phone number would you like to be contacted at? [phone]    How would you like to obtain your AVS? [employer]man     Mireya Fritz is a 19 year old female who presents via phone visit today for the following health issues:    HPI     Depression and Anxiety Follow-Up    How are you doing with your depression since your last visit? Worsened, doesn't think her Wellbutrin has been helping    How are you doing with your anxiety since your last visit?  Worsened, yes over the past couple of months     Are you having other symptoms that might be associated with depression or anxiety? Yes:  not being able to control worrying     Have you had a significant life event? No     Do you have any concerns with your use of alcohol or other drugs? No     Patient is concerned that her birth control is effecting her anxiety and depression or maybe " just needs to switch her Wellbutrin.     Social History     Tobacco Use     Smoking status: Current Every Day Smoker     Types: Other, Vaping Device     Smokeless tobacco: Never Used     Tobacco comment: e- cig    Substance Use Topics     Alcohol use: Yes     Drug use: No     PHQ 11/19/2019 12/17/2019 9/19/2020   PHQ-9 Total Score 18 18 18   Q9: Thoughts of better off dead/self-harm past 2 weeks Several days More than half the days Several days   F/U: Thoughts of suicide or self-harm Yes Yes Yes   F/U: Self harm-plan No No No   F/U: Self-harm action No No No   F/U: Safety concerns No No No     DIAZ-7 SCORE 11/19/2019 12/17/2019 9/19/2020   Total Score 13 (moderate anxiety) 13 (moderate anxiety) 20 (severe anxiety)   Total Score 13 13 20     Last PHQ-9 10/19/2020   1.  Little interest or pleasure in doing things 2   2.  Feeling down, depressed, or hopeless 2   3.  Trouble falling or staying asleep, or sleeping too much 0   4.  Feeling tired or having little energy 3   5.  Poor appetite or overeating 3   6.  Feeling bad about yourself 3   7.  Trouble concentrating 0   8.  Moving slowly or restless 0   Q9: Thoughts of better off dead/self-harm past 2 weeks 0   PHQ-9 Total Score 13   Difficulty at work, home, or with people Somewhat difficult   In the past two weeks have you had thoughts of suicide or self harm? -   Do you have concerns about your personal safety or the safety of others? -   In the past 2 weeks have you thought about a plan or had intention to harm yourself? -   In the past 2 weeks have you acted on these thoughts in any way? -     DIAZ-7  10/19/2020   1. Feeling nervous, anxious, or on edge 3   2. Not being able to stop or control worrying 3   3. Worrying too much about different things 3   4. Trouble relaxing 3   5. Being so restless that it is hard to sit still 1   6. Becoming easily annoyed or irritable 3   7. Feeling afraid, as if something awful might happen 3   DIAZ-7 Total Score 19   If you checked  any problems, how difficult have they made it for you to do your work, take care of things at home, or get along with other people? Somewhat difficult       Suicide Assessment Five-step Evaluation and Treatment (SAFE-T)      Doing hybrid model for college at [employer] - this is going well.       Anxiety feels much worse since starting wellbutrin.  Had evaluation by psychiatry at [facility] and recommended wellbutrin earlier this year, hence the change.   Previously struggled with sweating on her past medications, but this is actually better now.  Depression symptoms have been slightly increased, as is typical when anxiety is worse.    No thoughts of suicide or plans for self harm.    Previously on prozac for years and had good response to this.    Has been talking to Aminta, her therapist, on a weekly basis.    Her mother had been wondering if her birth control is contributing to your mood.   Is currently sexually active, so needs reliable birth control.                  Review of Systems   Constitutional, psych, and gu systems are negative, except as otherwise noted.       Objective          Vitals:  No vitals were obtained today due to virtual visit.    Talking in full sentences, upbeat voice, no coughing    Remainder of exam unable to be completed due to telephone visits        Assessment/Plan:      ICD-10-CM    1. DIAZ (generalized anxiety disorder)  F41.1 FLUoxetine (PROZAC) 20 MG capsule    Symptoms worsening - plan to restart prozac and monitor.  Continue weekly therapy.  Send me MyChart update in 3 weeks, sooner with worsening   2. Moderate major depression (H)  F32.1 FLUoxetine (PROZAC) 20 MG capsule     buPROPion (WELLBUTRIN XL) 150 MG 24 hr tablet    See above, restart prozac.  Decrease wellbutrin to 150mg daily as this seems to be triggering anxiety.   3. Dysmenorrhea  N94.6 norethindrone-ethinyl estradiol (JUNEL FE 1/20) 1-20 MG-MCG tablet    Trial of lower dose of estrogen to see if this positively impacts  mood.         Phone call duration:  9 minutes      Lydia Rodriguez MD  Internal Medicine - Pediatrics

## 2020-10-20 ASSESSMENT — ANXIETY QUESTIONNAIRES: GAD7 TOTAL SCORE: 19

## 2020-11-16 ENCOUNTER — HEALTH MAINTENANCE LETTER (OUTPATIENT)
Age: 20
End: 2020-11-16

## 2020-12-29 DIAGNOSIS — F41.1 GAD (GENERALIZED ANXIETY DISORDER): ICD-10-CM

## 2020-12-29 DIAGNOSIS — F32.1 MODERATE MAJOR DEPRESSION (H): ICD-10-CM

## 2020-12-31 NOTE — TELEPHONE ENCOUNTER
Prescription approved per [facility] Refill Protocol.    Next 5 appointments (look out 90 days)    Jan 20, 2021  2:25 PM  (Arrive by 2:00 PM)  Adult Preventative Visit with Lydia Rodriguez MD  [facility] ([facility]) [address] [place] [id number] [phone]

## 2021-01-20 ENCOUNTER — OFFICE VISIT (OUTPATIENT)
Dept: PEDIATRICS | Facility: CLINIC | Age: 21
End: 2021-01-20
Payer: COMMERCIAL

## 2021-01-20 VITALS
BODY MASS INDEX: 33.52 KG/M2 | WEIGHT: 221.2 LBS | HEART RATE: 98 BPM | RESPIRATION RATE: 18 BRPM | OXYGEN SATURATION: 100 % | TEMPERATURE: 98.3 F | SYSTOLIC BLOOD PRESSURE: 112 MMHG | DIASTOLIC BLOOD PRESSURE: 70 MMHG | HEIGHT: 68 IN

## 2021-01-20 DIAGNOSIS — Z00.00 ROUTINE HISTORY AND PHYSICAL EXAMINATION OF ADULT: Primary | ICD-10-CM

## 2021-01-20 DIAGNOSIS — F32.1 MODERATE MAJOR DEPRESSION (H): ICD-10-CM

## 2021-01-20 DIAGNOSIS — F41.1 GAD (GENERALIZED ANXIETY DISORDER): ICD-10-CM

## 2021-01-20 DIAGNOSIS — Z23 NEED FOR TDAP VACCINATION: ICD-10-CM

## 2021-01-20 DIAGNOSIS — Z12.4 CERVICAL CANCER SCREENING: ICD-10-CM

## 2021-01-20 DIAGNOSIS — G43.009 MIGRAINE WITHOUT AURA AND WITHOUT STATUS MIGRAINOSUS, NOT INTRACTABLE: ICD-10-CM

## 2021-01-20 PROCEDURE — 96127 BRIEF EMOTIONAL/BEHAV ASSMT: CPT | Performed by: PEDIATRICS

## 2021-01-20 PROCEDURE — 87491 CHLMYD TRACH DNA AMP PROBE: CPT | Performed by: PEDIATRICS

## 2021-01-20 PROCEDURE — 90715 TDAP VACCINE 7 YRS/> IM: CPT | Performed by: PEDIATRICS

## 2021-01-20 PROCEDURE — G0145 SCR C/V CYTO,THINLAYER,RESCR: HCPCS | Performed by: PEDIATRICS

## 2021-01-20 PROCEDURE — 87591 N.GONORRHOEAE DNA AMP PROB: CPT | Performed by: PEDIATRICS

## 2021-01-20 PROCEDURE — 90472 IMMUNIZATION ADMIN EACH ADD: CPT | Performed by: PEDIATRICS

## 2021-01-20 PROCEDURE — 90686 IIV4 VACC NO PRSV 0.5 ML IM: CPT | Performed by: PEDIATRICS

## 2021-01-20 PROCEDURE — 99395 PREV VISIT EST AGE 18-39: CPT | Mod: 25 | Performed by: PEDIATRICS

## 2021-01-20 PROCEDURE — 90471 IMMUNIZATION ADMIN: CPT | Performed by: PEDIATRICS

## 2021-01-20 RX ORDER — BUPROPION HYDROCHLORIDE 150 MG/1
150 TABLET ORAL EVERY MORNING
Qty: 90 TABLET | Refills: 3 | Status: SHIPPED | OUTPATIENT
Start: 2021-01-20 | End: 2021-05-28

## 2021-01-20 ASSESSMENT — ENCOUNTER SYMPTOMS
COUGH: 0
PALPITATIONS: 0
CHILLS: 0
SHORTNESS OF BREATH: 0
DYSURIA: 0
NERVOUS/ANXIOUS: 1
CONSTIPATION: 0
HEARTBURN: 0
NAUSEA: 0
PARESTHESIAS: 0
DIZZINESS: 0
ARTHRALGIAS: 0
WEAKNESS: 0
MYALGIAS: 0
DIARRHEA: 0
HEMATOCHEZIA: 0
JOINT SWELLING: 0
FEVER: 0
SORE THROAT: 0
FREQUENCY: 0
EYE PAIN: 0
HEADACHES: 1
ABDOMINAL PAIN: 0
HEMATURIA: 0

## 2021-01-20 ASSESSMENT — PATIENT HEALTH QUESTIONNAIRE - PHQ9
10. IF YOU CHECKED OFF ANY PROBLEMS, HOW DIFFICULT HAVE THESE PROBLEMS MADE IT FOR YOU TO DO YOUR WORK, TAKE CARE OF THINGS AT HOME, OR GET ALONG WITH OTHER PEOPLE: VERY DIFFICULT
SUM OF ALL RESPONSES TO PHQ QUESTIONS 1-9: 16
SUM OF ALL RESPONSES TO PHQ QUESTIONS 1-9: 16

## 2021-01-20 ASSESSMENT — MIFFLIN-ST. JEOR: SCORE: 1809.86

## 2021-01-20 NOTE — PROGRESS NOTES
SUBJECTIVE:   CC: Mireya Fritz is an 20 year old woman who presents for preventive health visit.   Patient has been advised of split billing requirements and indicates understanding: Yes     Healthy Habits:     Getting at least 3 servings of Calcium per day:  NO    Bi-annual eye exam:  Yes    Dental care twice a year:  NO    Sleep apnea or symptoms of sleep apnea:  None and Daytime drowsiness    Diet:  Regular (no restrictions)    Frequency of exercise:  1 day/week    Duration of exercise:  30-45 minutes    Taking medications regularly:  Yes    Medication side effects:  None    PHQ-2 Total Score: 4    Additional concerns today:  No      Today's PHQ-2 Score:   PHQ-2 ( 1999 [employer]) 1/20/2021   Q1: Little interest or pleasure in doing things 3   Q2: Feeling down, depressed or hopeless 1   PHQ-2 Score 4   Q1: Little interest or pleasure in doing things Nearly every day   Q2: Feeling down, depressed or hopeless Several days   PHQ-2 Score 4       Abuse: Current or Past (Physical, Sexual or Emotional) - No  Do you feel safe in your environment? Yes      Social History     Tobacco Use     Smoking status: Current Every Day Smoker     Types: Other, Vaping Device     Smokeless tobacco: Never Used     Tobacco comment: uses e-cigs   Substance Use Topics     Alcohol use: Yes     Comment: once a every weekend         Alcohol Use 1/20/2021   Prescreen: >3 drinks/day or >7 drinks/week? No   Answers for HPI/ROS submitted by the patient on 1/20/2021   Annual Exam:  If you checked off any problems, how difficult have these problems made it for you to do your work, take care of things at home, or get along with other people?: Very difficult  PHQ9 TOTAL SCORE: 16      Reviewed orders with patient.  Reviewed health maintenance and updated orders accordingly - Yes      Mammogram not appropriate for this patient based on age.    Pertinent mammograms are reviewed under the imaging tab.  History of abnormal Pap smear: NO - age 21-29  PAP every 3 years recommended     Reviewed and updated as needed this visit by clinical staff  Tobacco  Allergies  Meds   Med Hx  Surg Hx  Fam Hx  Soc Hx        Reviewed and updated as needed this visit by Provider                  PHQ 9/19/2020 10/19/2020 1/20/2021   PHQ-9 Total Score 18 13 16   Q9: Thoughts of better off dead/self-harm past 2 weeks Several days Not at all Several days   F/U: Thoughts of suicide or self-harm Yes - No   F/U: Self harm-plan No - -   F/U: Self-harm action No - -   F/U: Safety concerns No - No     DIAZ-7 SCORE 12/17/2019 9/19/2020 10/19/2020   Total Score 13 (moderate anxiety) 20 (severe anxiety) -   Total Score 13 20 19     Depression and anxiety - patient feels that she is stable on current medications.  No active thoughts of self harm and is active in therapy - just saw her therapist today.   Prefers not to change medications at present.  Very excited about going back to [employer] for spring semester of her wilfrido year.  Excited to get back to see her friends.      Migraines - frequency of migraines has decreased - reviewed headache log today.   Only had 3 migraines in December - a huge improvement.  Patient tried her sister's glasses when home from school and is getting her own prescription glasses - thinks that this is helping.     Broke up with her boyfriend in November and did ok with this.   No STI concerns.   Does have an irritated area of the right vulva that she'd like examined today.   No new internal vaginal pain, itching, or discharge.        Review of Systems   Constitutional: Negative for chills and fever.   HENT: Negative for congestion, ear pain, hearing loss and sore throat.    Eyes: Negative for pain and visual disturbance.   Respiratory: Negative for cough and shortness of breath.    Cardiovascular: Negative for chest pain, palpitations and peripheral edema.   Gastrointestinal: Negative for abdominal pain, constipation, diarrhea, heartburn, hematochezia and  "nausea.   Genitourinary: Positive for genital sores. Negative for dysuria, frequency, hematuria and urgency.   Musculoskeletal: Negative for arthralgias, joint swelling and myalgias.   Skin: Negative for rash.   Neurological: Positive for headaches. Negative for dizziness, weakness and paresthesias.   Psychiatric/Behavioral: Positive for mood changes. The patient is nervous/anxious.       OBJECTIVE:   /70 (BP Location: Right arm, Patient Position: Sitting, Cuff Size: Adult Large)   Pulse 98   Temp 98.3  F (36.8  C) (Tympanic)   Resp 18   Ht 1.716 m (5' 7.56\")   Wt 100.3 kg (221 lb 3.2 oz)   SpO2 100%   BMI 34.07 kg/m     Wt Readings from Last 4 Encounters:   01/20/21 100.3 kg (221 lb 3.2 oz)   08/11/20 98.2 kg (216 lb 6.4 oz)   12/17/19 86.8 kg (191 lb 6.4 oz)   08/01/19 83.5 kg (184 lb) (95 %, Z= 1.68)*     * Growth percentiles are based on CDC (Girls, 2-20 Years) data.       Physical Exam  GENERAL: healthy, alert and no distress  EYES: Eyes grossly normal to inspection, PERRL and conjunctivae and sclerae normal  HENT: ear canals and TM's normal, nose and mouth without ulcers or lesions  NECK: no adenopathy, no asymmetry, masses, or scars and thyroid normal to palpation  RESP: lungs clear to auscultation - no rales, rhonchi or wheezes  CV: regular rate and rhythm, normal S1 S2, no S3 or S4, no murmur, click or rub, no peripheral edema and peripheral pulses strong  ABDOMEN: soft, nontender, no hepatosplenomegaly, no masses and bowel sounds normal   (female): normal female external genitalia apart from mild redness over right labia majora with no fluctuance or drainage. normal urethral meatus, vaginal mucosa pink, moist, well rugated, and normal cervix/adnexa/uterus without masses or discharge.   MS: no gross musculoskeletal defects noted, no edema  SKIN: no suspicious lesions or rashes  NEURO: Normal strength and tone, mentation intact and speech normal  PSYCH: mentation appears normal, affect " "normal/bright    Diagnostic Test Results:  Labs reviewed in Epic  New studies pending    ASSESSMENT/PLAN:       ICD-10-CM    1. Routine history and physical examination of adult  Z00.00 Pap imaged thin layer screen only - recommended age 21 - 24 years     NEISSERIA GONORRHOEA PCR     CHLAMYDIA TRACHOMATIS PCR   2. Moderate major depression (H)  F32.1 FLUoxetine (PROZAC) 20 MG capsule     buPROPion (WELLBUTRIN XL) 150 MG 24 hr tablet  Patient reports adequate control, continue current medications, continue regular counseling    Asked patient to send me an update in about 4 weeks after she is back at college.     3. DIAZ (generalized anxiety disorder)  F41.1 FLUoxetine (PROZAC) 20 MG capsule  Well controlled, continue current medications     4. Migraine without aura and without status migrainosus, not intractable  G43.009 Improving, getting new glasses that should be helpful - monitor   5. Cervical cancer screening  Z12.4 Pap imaged thin layer screen only - recommended age 21 - 24 years   6. Need for Tdap vaccination  Z23 TDAP VACCINE (Adacel, Boostrix)  [[id number]]       Patient has been advised of split billing requirements and indicates understanding: No  COUNSELING:  Special attention given to:        Regular exercise       Healthy diet/nutrition       Vision screening       Contraception       Safe sex practices/STD prevention    Estimated body mass index is 34.07 kg/m  as calculated from the following:    Height as of this encounter: 1.716 m (5' 7.56\").    Weight as of this encounter: 100.3 kg (221 lb 3.2 oz).    Weight management plan: Discussed healthy diet and exercise guidelines    She reports that she has been smoking other and vaping device. She has never used smokeless tobacco.      Counseling Resources:  ATP IV Guidelines  Pooled Cohorts Equation Calculator  Breast Cancer Risk Calculator  BRCA-Related Cancer Risk Assessment: FHS-7 Tool  FRAX Risk Assessment  ICSI Preventive Guidelines  Dietary Guidelines " for Americans, 2010  USDA's MyPlate  ASA Prophylaxis  Lung CA Screening    Lydia Rodriguez MD  [facility]

## 2021-01-20 NOTE — PATIENT INSTRUCTIONS
[email or web address]    Schedule your COVID test    Pap smear today    Tdap and flu shots    Keep me posted about your mood and headaches - send me a MyChart update in about 4 weeks    Have a great start to spring semester!

## 2021-01-21 ASSESSMENT — PATIENT HEALTH QUESTIONNAIRE - PHQ9: SUM OF ALL RESPONSES TO PHQ QUESTIONS 1-9: 16

## 2021-01-25 LAB
COPATH REPORT: NORMAL
PAP: NORMAL

## 2021-03-11 ENCOUNTER — MYC MEDICAL ADVICE (OUTPATIENT)
Dept: PEDIATRICS | Facility: CLINIC | Age: 21
End: 2021-03-11

## 2021-03-15 ENCOUNTER — TRANSFERRED RECORDS (OUTPATIENT)
Dept: HEALTH INFORMATION MANAGEMENT | Facility: CLINIC | Age: 21
End: 2021-03-15

## 2021-03-17 NOTE — TELEPHONE ENCOUNTER
Called and scheduled patient tomorrow will PCP.     Danica Ramires, EMT at 12:16 PM on March 17, 2021  [facility]   [phone]

## 2021-03-18 ENCOUNTER — OFFICE VISIT (OUTPATIENT)
Dept: PEDIATRICS | Facility: CLINIC | Age: 21
End: 2021-03-18
Payer: COMMERCIAL

## 2021-03-18 VITALS
OXYGEN SATURATION: 98 % | BODY MASS INDEX: 33.09 KG/M2 | DIASTOLIC BLOOD PRESSURE: 70 MMHG | SYSTOLIC BLOOD PRESSURE: 110 MMHG | HEART RATE: 91 BPM | WEIGHT: 214.8 LBS | TEMPERATURE: 97.5 F

## 2021-03-18 DIAGNOSIS — F41.1 GAD (GENERALIZED ANXIETY DISORDER): ICD-10-CM

## 2021-03-18 DIAGNOSIS — N76.0 VAGINITIS AND VULVOVAGINITIS: ICD-10-CM

## 2021-03-18 DIAGNOSIS — F32.1 MODERATE MAJOR DEPRESSION (H): Primary | ICD-10-CM

## 2021-03-18 DIAGNOSIS — L30.9 DERMATITIS: ICD-10-CM

## 2021-03-18 PROCEDURE — 99214 OFFICE O/P EST MOD 30 MIN: CPT | Performed by: PEDIATRICS

## 2021-03-18 RX ORDER — TRIAMCINOLONE ACETONIDE 1 MG/G
OINTMENT TOPICAL 2 TIMES DAILY
Qty: 30 G | Refills: 1 | Status: SHIPPED | OUTPATIENT
Start: 2021-03-18 | End: 2022-12-01

## 2021-03-18 ASSESSMENT — PATIENT HEALTH QUESTIONNAIRE - PHQ9: SUM OF ALL RESPONSES TO PHQ QUESTIONS 1-9: 14

## 2021-03-18 NOTE — PROGRESS NOTES
Assessment & Plan       ICD-10-CM    1. Moderate major depression (H)  F32.1 FLUoxetine (PROZAC) 20 MG capsule    Not optimally controlled - plan to increase to 40mg daily, continue weekly counseling, update me with how everything is going via broadbandchoicest in about 3 weeks     2. DIAZ (generalized anxiety disorder)  F41.1 FLUoxetine (PROZAC) 20 MG capsule  See above   3. Vaginitis and vulvovaginitis  N76.0 Reassuring exam today - discussed avoidance of any irritating products, not shaving affected area until no longer having any symptoms.  Plan use of a steroid ointment as needed (prescribed below).  If not improving with this measure, discussed gynecology referral.     4. Dermatitis  L30.9 triamcinolone (KENALOG) 0.1 % external ointment          Tobacco Cessation:   reports that she has been smoking other and vaping device. She has never used smokeless tobacco.  Tobacco Cessation Action Plan: will review at next visit    See Patient Instructions    Return in about 3 weeks (around 4/8/2021) for Follow up, with me, using [employer] message.    Lydia Rodriguez MD  [facility] REBECCA Gomes is a 20 year old who presents for the following health issues:    HPI     Depression and Anxiety Follow-Up    How are you doing with your depression since your last visit? Worsened a little, noticed decreased energy, over eating     How are you doing with your anxiety since your last visit? Same    Are you having other symptoms that might be associated with depression or anxiety? Yes:  see PHQ9 and GAD7    Have you had a significant life event? No     Do you have any concerns with your use of alcohol or other drugs? No    Social History     Tobacco Use     Smoking status: Current Every Day Smoker     Types: Other, Vaping Device     Smokeless tobacco: Never Used     Tobacco comment: uses e-cigs   Substance Use Topics     Alcohol use: Yes     Comment: once a every weekend     Drug use: No     PHQ  10/19/2020 1/20/2021 3/18/2021   PHQ-9 Total Score 13 16 14   Q9: Thoughts of better off dead/self-harm past 2 weeks Not at all Several days Not at all   F/U: Thoughts of suicide or self-harm - No -   F/U: Self harm-plan - - -   F/U: Self-harm action - - -   F/U: Safety concerns - No -     DIAZ-7 SCORE 12/17/2019 9/19/2020 10/19/2020   Total Score 13 (moderate anxiety) 20 (severe anxiety) -   Total Score 13 20 19       Taking social psychology - just finished this course as part of a block schedule.  Looking forward to her next block class, which is a theology class.    Anxiety/depression -  No suicidal thoughts or thoughts of self harm.  Continues to see her therapist weekly.   Doing well in school.  Previously on a higher dose of prozac and wondering about having the dose increased again.  We were previously limited by sweating, but that has been less of a concern recently.       Vaginal concerns - initially felt like a shaving cut, then lasted a really long time.  Intermittently continues to have itching and burning at this same spot between the labia minora and majora on the right side.  Not worried about STIs - no new exposures since last negative testing.   Declines STI screening today.    No internal itching or vaginal discharge.    OTC hydrocortisone didn't seem to help a lot.  Felt better, then shaved, then intermittently seems to flare.  No drainage or bleeding.      Suicide Assessment Five-step Evaluation and Treatment (SAFE-T)      Vaginal Symptoms  Onset/Duration: 2 months   Description:  Vaginal Discharge: white   Itching (Pruritis): YES- sometimes  Burning sensation:  YES- sometimes randomly   Odor: no  Accompanying Signs & Symptoms:  Urinary symptoms: no  Abdominal pain: no  Fever: no  History:   Sexually active: no  New Partner: no  Possibility of Pregnancy:  no  Recent antibiotic use: no  Previous vaginitis issues: no  Precipitating or alleviating factors: None  Therapies tried and outcome: Tried  anti-itch cream that recommended, didn't help always      Review of Systems   Constitutional, psych, GI,  systems are negative, except as otherwise noted.      Objective    /70 (BP Location: Right arm, Patient Position: Sitting, Cuff Size: Adult Regular)   Pulse 91   Temp 97.5  F (36.4  C) (Tympanic)   Wt 97.4 kg (214 lb 12.8 oz)   SpO2 98%   BMI 33.09 kg/m    Body mass index is 33.09 kg/m .  Physical Exam   GENERAL: healthy, alert and no distress   (female): slightly erythematous patch between labia minora and majora on the right side.  Genital region shaved.  No areas of fluctuance or purulence.  No papules, pustules, or vesicles.  No current tenderness or discomfort.  No abnormal discharge.  PSYCH: mentation appears normal, affect normal/bright      Lydia Rodriguez MD

## 2021-03-18 NOTE — PATIENT INSTRUCTIONS
Increase prozac to 40mg daily - send me a [employer] message update in 3-4 weeks    Trial of a stronger steroid ointment for irritated area.  Use as needed.   If not getting better, send me a message and we will get you in to see gynecology.  Avoid any scented products.

## 2021-04-20 ENCOUNTER — TELEPHONE (OUTPATIENT)
Dept: PEDIATRICS | Facility: CLINIC | Age: 21
End: 2021-04-20

## 2021-04-20 ENCOUNTER — MYC MEDICAL ADVICE (OUTPATIENT)
Dept: PEDIATRICS | Facility: CLINIC | Age: 21
End: 2021-04-20

## 2021-04-20 NOTE — TELEPHONE ENCOUNTER
Yes, our plan was to have Christiana see Ob/Gyn if her symptoms weren't resolved.   Please help her schedule with our Ob/Gyn team.    Thanks!    Lydia Rodriguez MD  Internal Medicine - Pediatrics

## 2021-04-20 NOTE — TELEPHONE ENCOUNTER
Patient Quality Outreach      Summary:    Patient has the following on her problem list/HM:     Depression / Dysthymia review    12 Month Remission: 10-14 month window range: Within range       PHQ-9 SCORE 10/19/2020 1/20/2021 3/18/2021   PHQ-9 Total Score MyChart - 16 (Moderately severe depression) -   PHQ-9 Total Score 13 16 14       If PHQ-9 recheck is 5 or more, route to provider for next steps.    Patient is due/failing the following:   Depression follow-up visit - provider just wanted update on how patient is doing.     Type of outreach:    Phone, left message for patient/parent to call back. and Sent [employer] message.    Questions for provider review:    None                                                                                                                                     Laurence Avery CMA     Chart routed to Care Team .

## 2021-05-28 ENCOUNTER — OFFICE VISIT (OUTPATIENT)
Dept: OBGYN | Facility: CLINIC | Age: 21
End: 2021-05-28
Payer: COMMERCIAL

## 2021-05-28 VITALS
SYSTOLIC BLOOD PRESSURE: 110 MMHG | WEIGHT: 210 LBS | BODY MASS INDEX: 31.83 KG/M2 | HEIGHT: 68 IN | DIASTOLIC BLOOD PRESSURE: 68 MMHG

## 2021-05-28 DIAGNOSIS — N89.8 VAGINAL ITCHING: Primary | ICD-10-CM

## 2021-05-28 LAB
SPECIMEN SOURCE: NORMAL
WET PREP SPEC: NORMAL

## 2021-05-28 PROCEDURE — 87210 SMEAR WET MOUNT SALINE/INK: CPT | Performed by: OBSTETRICS & GYNECOLOGY

## 2021-05-28 PROCEDURE — 87491 CHLMYD TRACH DNA AMP PROBE: CPT | Performed by: OBSTETRICS & GYNECOLOGY

## 2021-05-28 PROCEDURE — 87591 N.GONORRHOEAE DNA AMP PROB: CPT | Performed by: OBSTETRICS & GYNECOLOGY

## 2021-05-28 PROCEDURE — 99203 OFFICE O/P NEW LOW 30 MIN: CPT | Performed by: OBSTETRICS & GYNECOLOGY

## 2021-05-28 ASSESSMENT — MIFFLIN-ST. JEOR: SCORE: 1759.07

## 2021-05-28 NOTE — NURSING NOTE
"Chief Complaint   Patient presents with     Vaginal Discharge       Initial /68 (BP Location: Right arm, Patient Position: Sitting, Cuff Size: Adult Regular)   Ht 1.716 m (5' 7.56\")   Wt 95.3 kg (210 lb)   LMP  (LMP Unknown)   BMI 32.35 kg/m   Estimated body mass index is 32.35 kg/m  as calculated from the following:    Height as of this encounter: 1.716 m (5' 7.56\").    Weight as of this encounter: 95.3 kg (210 lb).  BP completed using cuff size: regular    Questioned patient about current smoking habits.  Pt. has never smoked.      The following HM Due: NONE    Oliver Aguila CMA                "

## 2021-05-28 NOTE — PROGRESS NOTES
"    Assessment & Plan     Vaginal itching  - Appears to be a chronic vulvitis possibly due to chemical irritants (soaps, detergents, hair removal, etc) vs yeast or other infection (less likely based on clinical exam.  - Wet prep  - NEISSERIA GONORRHOEA PCR  - CHLAMYDIA TRACHOMATIS PCR  - I d/w Pt avoidance of using soaps and detergents w/ fragrances and dyes, and to reduce amount of laundry detergent in each load to ensure final rinse is relatively suds-free.  Also advised to refrain from perineal hair removal for awhile to see if itching resolves with that as well.  - Pt so use her already Rx'd Triamcinolone ointment but as a 3-week taper, which is what I usually recommend for chronic vulvitis of non-infectious cause:  Apply BID x 1 week, every day x 1 week, then every other day x 1 week.  - If yeast found on Wet prep, will send Rx Diflucan                 No follow-ups on file.    Musa eMadows MD  [facility] REBECCA Gomes is a 20 year old who presents for the following health issues     HPI     Vaginal Symptoms  Onset/Duration: 6 months ago  Description: External itching around the posterior labia bilaterally  Vaginal Discharge: none   Itching (Pruritis): YES  Burning sensation:  no  Odor: no  Accompanying Signs & Symptoms:  Urinary symptoms: no  Abdominal pain: no  Fever: no  History:   Sexually active: no  New Partner: no  Possibility of Pregnancy:  no  Recent antibiotic use: no  Previous vaginitis issues: YES - Did get a Rx Triamcinolone oint from PCP but only used sporadically  Precipitating or alleviating factors: None  Therapies tried and outcome: Triamcinolone oint used sporadically with improvement transiently each time.          Review of Systems         Objective    /68 (BP Location: Right arm, Patient Position: Sitting, Cuff Size: Adult Regular)   Ht 1.716 m (5' 7.56\")   Wt 95.3 kg (210 lb)   LMP  (LMP Unknown)   BMI 32.35 kg/m    Body mass index is 32.35 " kg/m .  Physical Exam     Pelvic- Exam chaperoned by nurse, External genitalia with mild erythema of bilateral labia minora and towards posterior fourchette, Bartholin's glands normal, [place]'s glands normal, Urethral meatus normal, Urethra normal, Bladder normal, Vagina with normal rugae, no abnormal lesions, no abnormal discharge, Normal cervix without lesions or mucopus, no cervical motion tenderness, Anus normal, GC/Chlam probe was Done    Labs 1/20/2021:  Office Visit on 01/20/2021   Component Date Value Ref Range Status     PAP 01/20/2021 NIL   Final     Copath Report 01/20/2021    Final                    Value:  Patient Name: MEENA COLLADO  MR#: [id number]  Specimen #: [id number]  Collected: 1/20/2021  Received: 1/21/2021  Reported: 1/25/2021 10:44  Ordering Phy(s): MONIE BUSCH    For improved result formatting, select 'View Enhanced Report Format' under   Linked Documents section.    SPECIMEN/STAIN PROCESS:  Pap imaged thin layer prep screening (Surepath, FocalPoint with guided   screening)       Pap-Cyto x 1    SOURCE: Cervical, endocervical  ----------------------------------------------------------------   Pap imaged thin layer prep screening (Surepath, FocalPoint with guided   screening)  SPECIMEN ADEQUACY:  Satisfactory for evaluation.  -Transformation zone component absent.    CYTOLOGIC INTERPRETATION:    Negative for intraepithelial lesion or malignancy    Electronically signed out by:  EZIO Bello (ASCP)    CLINICAL HISTORY:    Birth control,    Papanicolaou Test Limitations:  Cervical cytology is a screening test with   limited sensitivity; regular  screening is c                          ritical for cancer prevention; Pap tests are primarily   effective for the diagnosis/prevention of  squamous cell carcinoma, not adenocarcinomas or other cancers.    COLLECTION SITE:  Client:  [facility]  Location: [facility] ()    The technical component of this testing was completed at the  [facility], with the professional component performed   at the [facility], [address],   [place] [address] ([phone])         Specimen Descrip 01/20/2021 Cervix   Final     N Gonorrhea PCR 01/20/2021 Negative  NEG^Negative Final    Comment: Negative for N. gonorrhoeae rRNA by transcription mediated amplification.  A negative result by transcription mediated amplification does not preclude   the presence of N. gonorrhoeae infection because results are dependent on   proper and adequate collection, absence of inhibitors, and sufficient rRNA to   be detected.       Specimen Description 01/20/2021 Cervix   Final     Chlamydia Trachomatis PCR 01/20/2021 Negative  NEG^Negative Final    Comment: Negative for C. trachomatis rRNA by transcription mediated amplification.  A negative result by transcription mediated amplification does not preclude   the presence of C. trachomatis infection because results are dependent on   proper and adequate collection, absence of inhibitors, and sufficient rRNA to   be detected.

## 2021-06-01 ENCOUNTER — TRANSFERRED RECORDS (OUTPATIENT)
Dept: HEALTH INFORMATION MANAGEMENT | Facility: CLINIC | Age: 21
End: 2021-06-01

## 2021-06-17 ENCOUNTER — TRANSFERRED RECORDS (OUTPATIENT)
Dept: HEALTH INFORMATION MANAGEMENT | Facility: CLINIC | Age: 21
End: 2021-06-17

## 2021-06-28 DIAGNOSIS — F41.1 GAD (GENERALIZED ANXIETY DISORDER): ICD-10-CM

## 2021-06-28 DIAGNOSIS — F32.1 MODERATE MAJOR DEPRESSION (H): ICD-10-CM

## 2021-06-30 RX ORDER — BUSPIRONE HYDROCHLORIDE 30 MG/1
30 TABLET ORAL 2 TIMES DAILY
Qty: 180 TABLET | Refills: 3 | OUTPATIENT
Start: 2021-06-30

## 2021-07-08 ENCOUNTER — MYC MEDICAL ADVICE (OUTPATIENT)
Dept: PEDIATRICS | Facility: CLINIC | Age: 21
End: 2021-07-08

## 2021-07-09 NOTE — TELEPHONE ENCOUNTER
Called [facility] in [facility]. They state they have patient's prescriptions available for .     Called and spoke with patient. Patient states that since coming home for the summer she has been  Using the [facility] at [facility]. Informed patient that she needs to request a pharmacy to pharmacy transfer of her prescriptions.     Patient agree's to plan.     Vi Rodríguez RN on 7/15/2021 at 9:15 AM

## 2021-07-14 DIAGNOSIS — F41.1 GAD (GENERALIZED ANXIETY DISORDER): ICD-10-CM

## 2021-07-14 DIAGNOSIS — F32.1 MODERATE MAJOR DEPRESSION (H): ICD-10-CM

## 2021-07-15 RX ORDER — BUSPIRONE HYDROCHLORIDE 30 MG/1
30 TABLET ORAL 2 TIMES DAILY
Qty: 180 TABLET | Refills: 3 | OUTPATIENT
Start: 2021-07-15

## 2021-07-27 NOTE — TELEPHONE ENCOUNTER
Patient Quality Outreach Summary      Summary:    Patient is due/failing the following:   PHQ-9 Needed    Type of outreach:    Patient has been chatting with PCP via [facility] [employer] message.     Questions for provider review:    None                                                                                                                    Laurence Avery CMA     Chart routed to Care Team.

## 2021-09-13 DIAGNOSIS — N94.6 DYSMENORRHEA: ICD-10-CM

## 2021-09-15 RX ORDER — NORETHINDRONE ACETATE/ETHINYL ESTRADIOL AND FERROUS FUMARATE 1MG-20(21)
KIT ORAL
Qty: 84 TABLET | Refills: 0 | Status: SHIPPED | OUTPATIENT
Start: 2021-09-15 | End: 2021-09-16

## 2021-09-15 ASSESSMENT — ANXIETY QUESTIONNAIRES
6. BECOMING EASILY ANNOYED OR IRRITABLE: MORE THAN HALF THE DAYS
7. FEELING AFRAID AS IF SOMETHING AWFUL MIGHT HAPPEN: NEARLY EVERY DAY
GAD7 TOTAL SCORE: 19
4. TROUBLE RELAXING: NEARLY EVERY DAY
5. BEING SO RESTLESS THAT IT IS HARD TO SIT STILL: MORE THAN HALF THE DAYS
8. IF YOU CHECKED OFF ANY PROBLEMS, HOW DIFFICULT HAVE THESE MADE IT FOR YOU TO DO YOUR WORK, TAKE CARE OF THINGS AT HOME, OR GET ALONG WITH OTHER PEOPLE?: VERY DIFFICULT
GAD7 TOTAL SCORE: 19
3. WORRYING TOO MUCH ABOUT DIFFERENT THINGS: NEARLY EVERY DAY
2. NOT BEING ABLE TO STOP OR CONTROL WORRYING: NEARLY EVERY DAY
7. FEELING AFRAID AS IF SOMETHING AWFUL MIGHT HAPPEN: NEARLY EVERY DAY
GAD7 TOTAL SCORE: 19
1. FEELING NERVOUS, ANXIOUS, OR ON EDGE: NEARLY EVERY DAY

## 2021-09-15 ASSESSMENT — PATIENT HEALTH QUESTIONNAIRE - PHQ9
10. IF YOU CHECKED OFF ANY PROBLEMS, HOW DIFFICULT HAVE THESE PROBLEMS MADE IT FOR YOU TO DO YOUR WORK, TAKE CARE OF THINGS AT HOME, OR GET ALONG WITH OTHER PEOPLE: VERY DIFFICULT
SUM OF ALL RESPONSES TO PHQ QUESTIONS 1-9: 17
SUM OF ALL RESPONSES TO PHQ QUESTIONS 1-9: 17

## 2021-09-15 NOTE — TELEPHONE ENCOUNTER
Medication is being filled for 1 time refill only due to:  Patient needs to be seen because annual visit due for ongoing refills  Routed to TC for scheduling  Prescription approved per [facility] Refill Protocol.  Kerry Hicks RN, BSN  Message handled by CLINIC NURSE.

## 2021-09-16 ENCOUNTER — VIRTUAL VISIT (OUTPATIENT)
Dept: PEDIATRICS | Facility: CLINIC | Age: 21
End: 2021-09-16
Payer: COMMERCIAL

## 2021-09-16 DIAGNOSIS — R19.7 DIARRHEA, UNSPECIFIED TYPE: ICD-10-CM

## 2021-09-16 DIAGNOSIS — N94.6 DYSMENORRHEA: ICD-10-CM

## 2021-09-16 DIAGNOSIS — F32.1 MODERATE MAJOR DEPRESSION (H): ICD-10-CM

## 2021-09-16 DIAGNOSIS — F41.1 GAD (GENERALIZED ANXIETY DISORDER): Primary | ICD-10-CM

## 2021-09-16 PROCEDURE — 99215 OFFICE O/P EST HI 40 MIN: CPT | Mod: 95 | Performed by: NURSE PRACTITIONER

## 2021-09-16 RX ORDER — BUSPIRONE HYDROCHLORIDE 30 MG/1
30 TABLET ORAL 2 TIMES DAILY
Qty: 180 TABLET | Refills: 3 | Status: SHIPPED | OUTPATIENT
Start: 2021-09-16 | End: 2021-10-21

## 2021-09-16 RX ORDER — NORETHINDRONE ACETATE AND ETHINYL ESTRADIOL 1MG-20(21)
1 KIT ORAL DAILY
Qty: 84 TABLET | Refills: 3 | Status: SHIPPED | OUTPATIENT
Start: 2021-09-16 | End: 2022-05-31

## 2021-09-16 ASSESSMENT — PATIENT HEALTH QUESTIONNAIRE - PHQ9: SUM OF ALL RESPONSES TO PHQ QUESTIONS 1-9: 17

## 2021-09-16 ASSESSMENT — ANXIETY QUESTIONNAIRES: GAD7 TOTAL SCORE: 19

## 2021-09-16 NOTE — PROGRESS NOTES
Mireya is a 20 year old who is being evaluated via a billable telephone visit.      What phone number would you like to be contacted at? [phone]  How would you like to obtain your AVS? Bean        Assessment & Plan     DIAZ (generalized anxiety disorder)  Moderate major depression (H)  Worsening. Presents today reporting worsening anxiety and depression, her college studies are a trigger for her. She denies self harm and SI, feels safe at home. Currently prescribed prozac 40 mg daily and buspirone 30 mg twice daily. She is taking fluoxetine as prescribed but admits she is only taking buspirone 30 mg once daily in the pm, takes all her medications at night and has a hard time remembering to take her am dose of buspirone. Plan to increase fluoxetine to 60 mg daily and work on increasing medication compliance with her am dose of buspirone. Advised to start on on lower dose of buspirone in the am to avoid unwanted side effects, start with 7.5 mg (1/3 pill) x 2-3 days, then increase to 15 mg (1/2 pill) x 2-3 days, then increase to full pill (30 mg). She has a counselor, although has been quite busy so hasn't been able to see her counselor for a few weeks. Was seeing her once/week previously. Has appt scheduled to restart on a more regular basis. Plan for follow-up visit in 1 month (virtual), sooner if concerns.  - busPIRone HCl (BUSPAR) 30 MG tablet; Take 1 tablet (30 mg) by mouth 2 times daily    Dysmenorrhea  Requesting refill of OCPs. History of migraines but denies aura preceding migraine. Discussed that presence of aura is a contraindication to taking JOSE, so if this symptom were to present she is to notify PCP right away. No other contraindications to taking JOSE were identified today, refills given.   - norethindrone-ethinyl estradiol (ADAL FE 1/20) 1-20 MG-MCG tablet; Take 1 tablet by mouth daily    Diarrhea, unspecified type  Reports 1 month history of diarrhea. Does not occur every day, occurring 2  days/week. On days in which she does have diarrhea, symptoms are intermittent throughout the entire day.  Experiences abdominal discomfort with the diarrhea, however this resolves after having BM. Denies blood or mucous in the stool. No patterns with food. Symptoms possible related to uncontrolled anxiety. Will attempt to optimize control of anxiety and monitor the diarrhea. No s/sx consistent with bacterial infection so she can try imodium prn. Advised to start stool diary to track patterns. Counseled on s/sx warranting follow-up. Will check in on this problem again in 1 month at her next follow-up visit.         45 minutes spent on the date of the encounter doing chart review, patient visit and documentation         Depression Screening Follow Up    PHQ 9/15/2021   PHQ-9 Total Score 17   Q9: Thoughts of better off dead/self-harm past 2 weeks More than half the days   F/U: Thoughts of suicide or self-harm No   F/U: Self harm-plan -   F/U: Self-harm action -   F/U: Safety concerns No         Follow Up Actions Taken  Crisis resource information provided in the After Visit Summary  Referred patient back to mental health provider    Discussed the following ways the patient can remain in a safe environment:  be around others  MEDICATIONS:        - Increase fluoxetine to 60 mg, work on taking buspirone BID as prescribed       - Continue other medications without change  FUTURE APPOINTMENTS:       - Follow-up visit in 4 weeks    Return in about 4 weeks (around 10/14/2021) for Video Visit, Depression and/or Anxiety.    APRIL Hennessy [facility] REBECCA Gomes is a 20 year old who presents for the following health issues:    Presents today reporting worsening anxiety and depression. Currently prescribed prozac 40 mg daily and buspirone 30 mg twice daily. She is taking fluoxetine as prescribed but admits she is only taking buspirone 30 mg once daily in the pm, takes all her  medications at night and has a hard time remembering to take her am dose of buspirone.     School has been a trigger for her anxiety. Attending [employer] psychology.     She has a counselor, although has been quite busy so hasn't been able to see her counselor for a few weeks. Was seeing her once/week. Has appt set up to restart on a more regular basis.    Requesting refill of OCP. Admits to a history of migraines, however denies aura.     Reports 1 month history of diarrhea. Does not occur every day, occurring 2 days/week. On days in which she does have diarrhea, symptoms are intermittent throughout the entire day.  Experiences abdominal discomfort with the diarrhea, however this resolves after having BM. Denies blood or mucous in the stool. No patterns with food.         PHQ 1/20/2021 3/18/2021 9/15/2021   PHQ-9 Total Score 16 14 17   Q9: Thoughts of better off dead/self-harm past 2 weeks Several days Not at all More than half the days   F/U: Thoughts of suicide or self-harm No - No   F/U: Self harm-plan - - -   F/U: Self-harm action - - -   F/U: Safety concerns No - No     DIAZ-7 SCORE 9/19/2020 10/19/2020 9/15/2021   Total Score 20 (severe anxiety) - 19 (severe anxiety)   Total Score 20 19 19       Last PHQ-9 9/15/2021   1.  Little interest or pleasure in doing things 1   2.  Feeling down, depressed, or hopeless 2   3.  Trouble falling or staying asleep, or sleeping too much 1   4.  Feeling tired or having little energy 2   5.  Poor appetite or overeating 3   6.  Feeling bad about yourself 3   7.  Trouble concentrating 2   8.  Moving slowly or restless 1   Q9: Thoughts of better off dead/self-harm past 2 weeks 2   PHQ-9 Total Score 17   Difficulty at work, home, or with people -   In the past two weeks have you had thoughts of suicide or self harm? No   Do you have concerns about your personal safety or the safety of others? No   In the past 2 weeks have you thought about a plan or had intention to harm  yourself? -   In the past 2 weeks have you acted on these thoughts in any way? -     DIAZ-7  9/15/2021   1. Feeling nervous, anxious, or on edge 3   2. Not being able to stop or control worrying 3   3. Worrying too much about different things 3   4. Trouble relaxing 3   5. Being so restless that it is hard to sit still 2   6. Becoming easily annoyed or irritable 2   7. Feeling afraid, as if something awful might happen 3   DIAZ-7 Total Score 19   If you checked any problems, how difficult have they made it for you to do your work, take care of things at home, or get along with other people? -     Social History     Socioeconomic History     Marital status: Single     Spouse name: Not on file     Number of children: Not on file     Years of education: Not on file     Highest education level: Not on file   Occupational History     Not on file   Tobacco Use     Smoking status: Current Every Day Smoker     Types: Other, Vaping Device     Smokeless tobacco: Never Used     Tobacco comment: uses e-cigs   Substance and Sexual Activity     Alcohol use: Yes     Comment: once a every weekend     Drug use: No     Sexual activity: Not Currently     Partners: Male     Birth control/protection: Pill   Other Topics Concern     Parent/sibling w/ CABG, MI or angioplasty before 65F 55M? No   Social History Narrative     Not on file     Social Determinants of Health     Financial Resource Strain:      Difficulty of Paying Living Expenses:    Food Insecurity:      Worried About Running Out of Food in the Last Year:      Ran Out of Food in the Last Year:    Transportation Needs:      Lack of Transportation (Medical):      Lack of Transportation (Non-Medical):    Physical Activity:      Days of Exercise per Week:      Minutes of Exercise per Session:    Stress:      Feeling of Stress :    Social Connections:      Frequency of Communication with Friends and Family:      Frequency of Social Gatherings with Friends and Family:      Attends  Yazidism Services:      Active Member of Clubs or Organizations:      Attends Club or Organization Meetings:      Marital Status:    Intimate Partner Violence:      Fear of Current or Ex-Partner:      Emotionally Abused:      Physically Abused:      Sexually Abused:        Follow Up Actions Taken  Crisis resource information provided in the After Visit Summary     Discussed the following ways the patient can remain in a safe environment:  be around others    Past Medical History:   Diagnosis Date     Depression, unspecified depression type 7/12/2016     DIAZ (generalized anxiety disorder) 8/6/2016     Moderate major depression (H) 12/17/2019     Current Outpatient Medications   Medication     busPIRone HCl (BUSPAR) 30 MG tablet     norethindrone-ethinyl estradiol (ADAL FE 1/20) 1-20 MG-MCG tablet     FLUoxetine (PROZAC) 20 MG capsule     rizatriptan (MAXALT) 10 MG tablet     triamcinolone (KENALOG) 0.1 % external ointment     No current facility-administered medications for this visit.      No Known Allergies      Review of Systems    ROS: 10 point ROS neg other than the symptoms noted above in the HPI.        Objective       Vitals:  No vitals were obtained today due to virtual visit.    Physical Exam   healthy, alert and no distress  PSYCH: Alert and oriented times 3; coherent speech, normal   rate and volume, able to articulate logical thoughts, able   to abstract reason, no tangential thoughts, no hallucinations   or delusions  Her affect is normal and pleasant  RESP: No cough, no audible wheezing, able to talk in full sentences  Remainder of exam unable to be completed due to telephone visits            Phone call duration: 19 minutes

## 2021-09-17 NOTE — TELEPHONE ENCOUNTER
Called patient to schedule, no answer. M requesting a call back directly to PAL extension.     Danica Ramires, [facility]  [phone]  11:44 AM, September 17, 2021

## 2021-09-18 ENCOUNTER — HEALTH MAINTENANCE LETTER (OUTPATIENT)
Age: 21
End: 2021-09-18

## 2021-10-01 NOTE — TELEPHONE ENCOUNTER
Called patient to schedule, was currently at work and stated she would call back.     Danica Ramires, [facility]  [phone]  11:46 AM, October 1, 2021

## 2021-10-05 NOTE — TELEPHONE ENCOUNTER
Patient scheduled, no further outreach attempts needed.     Danica Ramires [facility]  [phone]  1:22 PM, October 5, 2021

## 2021-10-19 PROBLEM — F32.9 MAJOR DEPRESSION: Status: ACTIVE | Noted: 2019-12-17

## 2021-10-21 ENCOUNTER — VIRTUAL VISIT (OUTPATIENT)
Dept: PEDIATRICS | Facility: CLINIC | Age: 21
End: 2021-10-21
Payer: COMMERCIAL

## 2021-10-21 DIAGNOSIS — F41.1 GAD (GENERALIZED ANXIETY DISORDER): ICD-10-CM

## 2021-10-21 DIAGNOSIS — F32.1 MODERATE MAJOR DEPRESSION (H): ICD-10-CM

## 2021-10-21 DIAGNOSIS — G43.009 MIGRAINE WITHOUT AURA AND WITHOUT STATUS MIGRAINOSUS, NOT INTRACTABLE: Primary | ICD-10-CM

## 2021-10-21 PROCEDURE — 99214 OFFICE O/P EST MOD 30 MIN: CPT | Mod: 95 | Performed by: NURSE PRACTITIONER

## 2021-10-21 RX ORDER — TOPIRAMATE 25 MG/1
100 TABLET, FILM COATED ORAL 4 TIMES DAILY
COMMUNITY
Start: 2021-07-20 | End: 2021-10-21

## 2021-10-21 RX ORDER — BUSPIRONE HYDROCHLORIDE 30 MG/1
30 TABLET ORAL 2 TIMES DAILY
Qty: 180 TABLET | Refills: 3 | Status: SHIPPED | OUTPATIENT
Start: 2021-10-21 | End: 2022-01-20

## 2021-10-21 RX ORDER — TOPIRAMATE 100 MG/1
100 TABLET, FILM COATED ORAL DAILY
Qty: 90 TABLET | Refills: 1 | Status: SHIPPED | OUTPATIENT
Start: 2021-10-21 | End: 2022-07-25

## 2021-10-21 ASSESSMENT — PATIENT HEALTH QUESTIONNAIRE - PHQ9
SUM OF ALL RESPONSES TO PHQ QUESTIONS 1-9: 19
5. POOR APPETITE OR OVEREATING: SEVERAL DAYS

## 2021-10-21 ASSESSMENT — ANXIETY QUESTIONNAIRES
6. BECOMING EASILY ANNOYED OR IRRITABLE: MORE THAN HALF THE DAYS
GAD7 TOTAL SCORE: 12
1. FEELING NERVOUS, ANXIOUS, OR ON EDGE: MORE THAN HALF THE DAYS
7. FEELING AFRAID AS IF SOMETHING AWFUL MIGHT HAPPEN: MORE THAN HALF THE DAYS
5. BEING SO RESTLESS THAT IT IS HARD TO SIT STILL: SEVERAL DAYS
IF YOU CHECKED OFF ANY PROBLEMS ON THIS QUESTIONNAIRE, HOW DIFFICULT HAVE THESE PROBLEMS MADE IT FOR YOU TO DO YOUR WORK, TAKE CARE OF THINGS AT HOME, OR GET ALONG WITH OTHER PEOPLE: SOMEWHAT DIFFICULT
3. WORRYING TOO MUCH ABOUT DIFFERENT THINGS: MORE THAN HALF THE DAYS
2. NOT BEING ABLE TO STOP OR CONTROL WORRYING: MORE THAN HALF THE DAYS

## 2021-10-21 NOTE — PROGRESS NOTES
"Mireya is a 20 year old who is being evaluated via a billable telephone visit.      What phone number would you like to be contacted at? [phone]  How would you like to obtain your AVS? Bean    Assessment & Plan     DIAZ (generalized anxiety disorder)  Moderate major depression (H)  Improved. Increased adherence with buspirone, now taking twice daily. Doing better on increased dose of fluoxetine. Plan to continue medication without change. Continue counseling. Follow-up annually and as needed based on patient preference.  - FLUoxetine (PROZAC) 20 MG capsule; Take 3 capsules (60 mg) by mouth daily  - busPIRone HCl (BUSPAR) 30 MG tablet; Take 1 tablet (30 mg) by mouth 2 times daily    Migraine without aura and without status migrainosus, not intractable  History of migraines, using topiramate as preventative. Taking 100 mg daily. This was started about 5 months ago by provider through the [place] at which time she was having migraines on weekly basis. Migraine frequency has decreased, hasn't gotten one in \"quite awhile.\" Pt is hoping PCP will take over this prescription. Will provide refill to bridge until next routine preventative exam.   - topiramate (TOPAMAX) 100 MG tablet; Take 1 tablet (100 mg) by mouth daily      20 minutes spent on the date of the encounter doing chart review, patient visit and documentation          FUTURE APPOINTMENTS:       - Follow-up for annual visit or as needed      APRIL Hennessy CNP [facility] REBECCA    Ahsan Gomes is a 20 year old who presents for the following health issues:     HPI     Depression and Anxiety Follow-Up    How are you doing with your depression since your last visit? Improved with medication dose increase     How are you doing with your anxiety since your last visit?  Improved with medication dose increase     Are you having other symptoms that might be associated with depression or anxiety? No    Have you had a significant life " "event? No     Do you have any concerns with your use of alcohol or other drugs? No     Things have been going pretty well. Anxiety is improved since increasing compliance with buspirone and increasing fluoxetine. Started taking buspirone 30 mg again in the am. Roommates have been helping to remind her.  School has been stressful.  Counseling 1x/week.    Diarrhea is improved.    History of migraines, using topiramate as preventative. Taking 100 mg daily. This was started about 5 months ago by provider through the [place] at which time she was having migraines on weekly basis. Migraine frequency has decreased, hasn't gotten one in \"quite awhile.\" She is hoping PCP can take over refills.    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     Social History     Tobacco Use     Smoking status: Current Every Day Smoker     Types: Other, Vaping Device     Smokeless tobacco: Never Used     Tobacco comment: uses e-cigs   Substance Use Topics     Alcohol use: Yes     Comment: once a every weekend     Drug use: No     PHQ 3/18/2021 9/15/2021 10/21/2021   PHQ-9 Total Score 14 17 19   Q9: Thoughts of better off dead/self-harm past 2 weeks Not at all More than half the days Not at all   F/U: Thoughts of suicide or self-harm - No -   F/U: Self harm-plan - - -   F/U: Self-harm action - - -   F/U: Safety concerns - No -     DIAZ-7 SCORE 10/19/2020 9/15/2021 10/21/2021   Total Score - 19 (severe anxiety) -   Total Score 19 19 12         Suicide Assessment Five-step Evaluation and Treatment (SAFE-T)        Past Medical History:   Diagnosis Date     Depression, unspecified depression type 7/12/2016     DIAZ (generalized anxiety disorder) 8/6/2016     Moderate major depression (H) 12/17/2019     Current Outpatient Medications   Medication     busPIRone HCl (BUSPAR) 30 MG tablet     FLUoxetine (PROZAC) 20 MG capsule     topiramate (TOPAMAX) 100 MG tablet     norethindrone-ethinyl estradiol (ADAL FE 1/20) 1-20 MG-MCG tablet     triamcinolone " (KENALOG) 0.1 % external ointment     No current facility-administered medications for this visit.      No Known Allergies      Review of Systems    ROS: 10 point ROS neg other than the symptoms noted above in the HPI.        Objective       Vitals:  No vitals were obtained today due to virtual visit.    Physical Exam   healthy, alert and no distress  PSYCH: Alert and oriented times 3; coherent speech, normal   rate and volume, able to articulate logical thoughts, able   to abstract reason, no tangential thoughts, no hallucinations   or delusions  Her affect is normal and pleasant  RESP: No cough, no audible wheezing, able to talk in full sentences  Remainder of exam unable to be completed due to telephone visits            Phone call duration: 9 minutes

## 2021-10-22 ASSESSMENT — ANXIETY QUESTIONNAIRES: GAD7 TOTAL SCORE: 12

## 2022-01-05 ENCOUNTER — MYC MEDICAL ADVICE (OUTPATIENT)
Dept: PEDIATRICS | Facility: CLINIC | Age: 22
End: 2022-01-05
Payer: COMMERCIAL

## 2022-01-11 NOTE — TELEPHONE ENCOUNTER
Called patient, scheduled follow-up visit with Maddy Molina.     Danica Ramires, [facility]  [phone]

## 2022-01-20 ENCOUNTER — VIRTUAL VISIT (OUTPATIENT)
Dept: PEDIATRICS | Facility: CLINIC | Age: 22
End: 2022-01-20
Payer: COMMERCIAL

## 2022-01-20 DIAGNOSIS — F41.1 GAD (GENERALIZED ANXIETY DISORDER): ICD-10-CM

## 2022-01-20 DIAGNOSIS — F32.1 MODERATE MAJOR DEPRESSION (H): Primary | ICD-10-CM

## 2022-01-20 PROCEDURE — 99213 OFFICE O/P EST LOW 20 MIN: CPT | Mod: 95 | Performed by: NURSE PRACTITIONER

## 2022-01-20 RX ORDER — BUSPIRONE HYDROCHLORIDE 30 MG/1
30 TABLET ORAL AT BEDTIME
Qty: 180 TABLET | Refills: 3 | COMMUNITY
Start: 2022-01-20 | End: 2023-02-02 | Stop reason: DRUGHIGH

## 2022-01-20 NOTE — PROGRESS NOTES
Mireya is a 21 year old who is being evaluated via a billable video visit.      How would you like to obtain your AVS? MyChart  If the video visit is dropped, the invitation should be resent by: Text to cell phone: [phone]  Will anyone else be joining your video visit? No      Video Start Time: 8:32 am    Assessment & Plan     Moderate major depression (H)  DIAZ (generalized anxiety disorder)  Stable. Plan to continue fluoxetine 60 mg daily, no refills needed at this time. Continue buspirone as she has been taking it, once daily at bedtime. If anxiety/depression should worsen again, it will be best to restart the am dose. Continue counseling. Plan for annual follow-up and as needed.   - busPIRone HCl (BUSPAR) 30 MG tablet; Take 1 tablet (30 mg) by mouth At Bedtime      20 minutes spent on the date of the encounter doing chart review, patient visit and documentation       MEDICATIONS:  Continue current medications without change  FUTURE APPOINTMENTS:       - Follow-up for annual visit or as needed    Follow-up: Return to clinic if symptoms fail to improve, worsen, or new symptoms develop with the above treatment plan.       APRIL Hennessy CNP  [facility]    Ahsan Gomes is a 21 year old who presents for the following health issues:    HPI     Presents for mental health follow-up.     Currently taking fluoxetine 60 mg daily and buspirone 30 mg BID. Stopped taking buspirone in the morning again. Has been doing okay. Went through a rough patch a couple months ago related to her ex-boyfriend and living situation. She felt that when she took the buspirone in the morning, she would have a worse day than if she didn't take it, so this played a role in her decision to omit the am dose.     Is seeing her counselor once/week.    Senior at [employer], psychology. 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 [employer].    PHQ 3/18/2021 9/15/2021  10/21/2021   PHQ-9 Total Score 14 17 19   Q9: Thoughts of better off dead/self-harm past 2 weeks Not at all More than half the days Not at all   F/U: Thoughts of suicide or self-harm - No -   F/U: Self harm-plan - - -   F/U: Self-harm action - - -   F/U: Safety concerns - No -     DIAZ-7 SCORE 10/19/2020 9/15/2021 10/21/2021   Total Score - 19 (severe anxiety) -   Total Score 19 19 12     Patient Active Problem List   Diagnosis Code     Right shoulder pain M25.511     Depression, unspecified depression type F32.A     DIAZ (generalized anxiety disorder) F41.1     Dysmenorrhea N94.6     Migraine without aura and without status migrainosus, not intractable G43.009     Cervical pain M54.2     Biceps tendinitis of right upper extremity M75.21     Moderate major depression (H) F32.9     Moderate major depression (H) F32.1     Past Medical History:   Diagnosis Date     Depression, unspecified depression type 7/12/2016     DIAZ (generalized anxiety disorder) 8/6/2016     Moderate major depression (H) 12/17/2019     Current Outpatient Medications   Medication     busPIRone HCl (BUSPAR) 30 MG tablet     FLUoxetine (PROZAC) 20 MG capsule     norethindrone-ethinyl estradiol (ADAL FE 1/20) 1-20 MG-MCG tablet     topiramate (TOPAMAX) 100 MG tablet     triamcinolone (KENALOG) 0.1 % external ointment     No current facility-administered medications for this visit.      No Known Allergies      Review of Systems    ROS: 10 point ROS neg other than the symptoms noted above in the HPI.        Objective       Vitals:  No vitals were obtained today due to virtual visit.    Physical Exam   GENERAL: Healthy, alert and no distress  EYES: Eyes grossly normal to inspection.  No discharge or erythema, or obvious scleral/conjunctival abnormalities.  RESP: No audible wheeze, cough, or visible cyanosis.  No visible retractions or increased work of breathing.    SKIN: Visible skin clear. No significant rash, abnormal pigmentation or lesions.  NEURO:  Cranial nerves grossly intact.  Mentation and speech appropriate for age.  PSYCH: Mentation appears normal, affect normal/bright, judgement and insight intact, normal speech and appearance well-groomed.          Video-Visit Details    Type of service:  Video Visit    Video End Time:8:45 AM    Originating Location (pt. Location): Home    Distant Location (provider location):  [facility]     Platform used for Video Visit: [employer]

## 2022-03-05 ENCOUNTER — HEALTH MAINTENANCE LETTER (OUTPATIENT)
Age: 22
End: 2022-03-05

## 2022-04-22 ENCOUNTER — TELEPHONE (OUTPATIENT)
Dept: PEDIATRICS | Facility: CLINIC | Age: 22
End: 2022-04-22
Payer: COMMERCIAL

## 2022-04-22 NOTE — TELEPHONE ENCOUNTER
Called pt and LVM with direct number for call back. Also sent [employer] message. Need help clarifying what pt is currently taking and what she needs refill on. Also need PHQ9 and GAD7 done. This was sent in [employer].     Jennifer Jacobs, EMT at 10:20 AM on April 22, 2022  [facility]  [phone]

## 2022-05-12 ENCOUNTER — OFFICE VISIT (OUTPATIENT)
Dept: URGENT CARE | Facility: URGENT CARE | Age: 22
End: 2022-05-12
Payer: COMMERCIAL

## 2022-05-12 VITALS
OXYGEN SATURATION: 98 % | HEART RATE: 78 BPM | DIASTOLIC BLOOD PRESSURE: 81 MMHG | RESPIRATION RATE: 20 BRPM | SYSTOLIC BLOOD PRESSURE: 124 MMHG | TEMPERATURE: 98 F

## 2022-05-12 DIAGNOSIS — J02.9 SORE THROAT: Primary | ICD-10-CM

## 2022-05-12 DIAGNOSIS — J01.90 ACUTE SINUSITIS WITH COEXISTING CONDITION REQUIRING PROPHYLACTIC TREATMENT: ICD-10-CM

## 2022-05-12 LAB
BASOPHILS # BLD AUTO: 0 10E3/UL (ref 0–0.2)
BASOPHILS NFR BLD AUTO: 0 %
DEPRECATED S PYO AG THROAT QL EIA: NEGATIVE
EOSINOPHIL # BLD AUTO: 0.1 10E3/UL (ref 0–0.7)
EOSINOPHIL NFR BLD AUTO: 1 %
ERYTHROCYTE [DISTWIDTH] IN BLOOD BY AUTOMATED COUNT: 12.6 % (ref 10–15)
GROUP A STREP BY PCR: NOT DETECTED
HCT VFR BLD AUTO: 42.3 % (ref 35–47)
HGB BLD-MCNC: 14.2 G/DL (ref 11.7–15.7)
LYMPHOCYTES # BLD AUTO: 1.3 10E3/UL (ref 0.8–5.3)
LYMPHOCYTES NFR BLD AUTO: 19 %
MCH RBC QN AUTO: 30.9 PG (ref 26.5–33)
MCHC RBC AUTO-ENTMCNC: 33.6 G/DL (ref 31.5–36.5)
MCV RBC AUTO: 92 FL (ref 78–100)
MONOCYTES # BLD AUTO: 0.7 10E3/UL (ref 0–1.3)
MONOCYTES NFR BLD AUTO: 10 %
MONOCYTES NFR BLD AUTO: NEGATIVE %
NEUTROPHILS # BLD AUTO: 4.9 10E3/UL (ref 1.6–8.3)
NEUTROPHILS NFR BLD AUTO: 70 %
PLATELET # BLD AUTO: 196 10E3/UL (ref 150–450)
RBC # BLD AUTO: 4.59 10E6/UL (ref 3.8–5.2)
WBC # BLD AUTO: 7 10E3/UL (ref 4–11)

## 2022-05-12 PROCEDURE — 86308 HETEROPHILE ANTIBODY SCREEN: CPT | Performed by: FAMILY MEDICINE

## 2022-05-12 PROCEDURE — 36415 COLL VENOUS BLD VENIPUNCTURE: CPT | Performed by: FAMILY MEDICINE

## 2022-05-12 PROCEDURE — 99214 OFFICE O/P EST MOD 30 MIN: CPT | Performed by: FAMILY MEDICINE

## 2022-05-12 PROCEDURE — 85025 COMPLETE CBC W/AUTO DIFF WBC: CPT | Performed by: FAMILY MEDICINE

## 2022-05-12 PROCEDURE — 87651 STREP A DNA AMP PROBE: CPT | Performed by: FAMILY MEDICINE

## 2022-05-12 RX ORDER — DOXYCYCLINE 100 MG/1
100 CAPSULE ORAL 2 TIMES DAILY
Qty: 20 CAPSULE | Refills: 0 | Status: CANCELLED | OUTPATIENT
Start: 2022-05-12 | End: 2022-05-22

## 2022-05-12 NOTE — PROGRESS NOTES
SUBJECTIVE:   Mireya Fritz is a 21 year old female presenting with a chief complaint of sore throat, sinus congestion.  Onset of symptoms was 1 week(s) ago.  Course of illness is worsening.    Severity moderate  Current and Associated symptoms: sore throat, body aches  Treatment measures tried include: Ibuprofen, Fluids and Rest.  Predisposing factors include: TOB user.    Using eye drops for pink eye - this has improved  Has not had mono    No one else with similar symptoms  No close positive COVID exposure    5/6 -COVID screen negative at [facility]    Completed Moderna COVID vaccinations and boosted 12/6/2021    Past Medical History:   Diagnosis Date     Depression, unspecified depression type 7/12/2016     DIAZ (generalized anxiety disorder) 8/6/2016     Moderate major depression (H) 12/17/2019     Current Outpatient Medications   Medication Sig Dispense Refill     busPIRone (BUSPAR) 15 MG tablet Take 1 tablet (15 mg) by mouth 2 times daily 180 tablet 1     busPIRone HCl (BUSPAR) 30 MG tablet Take 1 tablet (30 mg) by mouth At Bedtime 180 tablet 3     FLUoxetine (PROZAC) 20 MG capsule Take 3 capsules (60 mg) by mouth daily 270 capsule 3     norethindrone-ethinyl estradiol (ADAL FE 1/20) 1-20 MG-MCG tablet Take 1 tablet by mouth daily 84 tablet 3     topiramate (TOPAMAX) 100 MG tablet Take 1 tablet (100 mg) by mouth daily 90 tablet 1     triamcinolone (KENALOG) 0.1 % external ointment Apply topically 2 times daily 30 g 1     Social History     Tobacco Use     Smoking status: Current Every Day Smoker     Types: Other, Vaping Device     Smokeless tobacco: Never Used     Tobacco comment: uses e-cigs   Substance Use Topics     Alcohol use: Yes     Comment: once a every weekend       ROS:  Review of systems negative except as stated above.    OBJECTIVE:  /81   Pulse 78   Temp 98  F (36.7  C)   Resp 20   SpO2 98%   GENERAL APPEARANCE: healthy, alert and no distress  EYES: EOMI,  PERRL, conjunctiva  clear  HENT: Mouth without ulcers, erythema or lesions, pharynx pink, no exudates, uvula midline  NECK: supple, nontender, no lymphadenopathy  RESP: lungs with no audible wheezes or increase work of breathing  PSYCH: mentation appears normal, affect normal/bright and fatigued    Results for orders placed or performed in visit on 05/12/22   Mononucleosis screen     Status: Normal   Result Value Ref Range    Mononucleosis Screen Negative Negative   CBC with platelets and differential     Status: None   Result Value Ref Range    WBC Count 7.0 4.0 - 11.0 10e3/uL    RBC Count 4.59 3.80 - 5.20 10e6/uL    Hemoglobin 14.2 11.7 - 15.7 g/dL    Hematocrit 42.3 35.0 - 47.0 %    MCV 92 78 - 100 fL    MCH 30.9 26.5 - 33.0 pg    MCHC 33.6 31.5 - 36.5 g/dL    RDW 12.6 10.0 - 15.0 %    Platelet Count 196 150 - 450 10e3/uL    % Neutrophils 70 %    % Lymphocytes 19 %    % Monocytes 10 %    % Eosinophils 1 %    % Basophils 0 %    Absolute Neutrophils 4.9 1.6 - 8.3 10e3/uL    Absolute Lymphocytes 1.3 0.8 - 5.3 10e3/uL    Absolute Monocytes 0.7 0.0 - 1.3 10e3/uL    Absolute Eosinophils 0.1 0.0 - 0.7 10e3/uL    Absolute Basophils 0.0 0.0 - 0.2 10e3/uL   Streptococcus A Rapid Screen w/Reflex to PCR     Status: Normal    Specimen: Throat; Swab   Result Value Ref Range    Group A Strep antigen Negative Negative   CBC with platelets and differential     Status: None    Narrative    The following orders were created for panel order CBC with platelets and differential.  Procedure                               Abnormality         Status                     ---------                               -----------         ------                     CBC with platelets and d...[[id number]]                      Final result                 Please view results for these tests on the individual orders.       ASSESSMENT/PLAN:  (J02.9) Sore throat  (primary encounter diagnosis)  Plan: Streptococcus A Rapid Screen w/Reflex to PCR,         Group A Streptococcus  PCR Throat Swab,         Mononucleosis screen, CBC with platelets and         differential            (J01.90) Acute sinusitis with coexisting condition requiring prophylactic treatment  Plan: amoxicillin-clavulanate (AUGMENTIN) 875-125 MG         tablet            Reassurance given, reviewed initial labs and discussed symptomatic treatment with tylenol, ibuprofen, plenty of fluids and rest.  RX Augmentin given for treatment for sinus infection due to worsening symptoms and comorbid TOB user.  Will follow up on throat culture and notify if positive for strep.    Follow up with primary provider if no improvement of symptoms in 1 week    Wilmar Austin MD  May 12, 2022 12:38 PM

## 2022-07-20 DIAGNOSIS — N94.6 DYSMENORRHEA: ICD-10-CM

## 2022-07-20 NOTE — LETTER
[facility]  [address]  [address]  [place] [address]  Phone: [phone]  Fax: [phone]        August 9, 2022      Mireya A Catrina                                                                                                                                [address] [address]            Dear Ms. Fritz,    We are concerned about your health care.  We recently provided you with a medication refill.  Many medications require routine follow-up with your Doctor.      At this time we ask that: You schedule an appointment for your annual physical.    Your prescription: No further refills will be given until your follow up care is completed.      Thank you,      Lydia Rodriguez MD

## 2022-07-25 RX ORDER — NORETHINDRONE ACETATE AND ETHINYL ESTRADIOL 1MG-20(21)
KIT ORAL
Qty: 84 TABLET | Refills: 0 | OUTPATIENT
Start: 2022-07-25

## 2022-07-25 NOTE — TELEPHONE ENCOUNTER
Has supply until 8/31/22, needs visit for ongoing refills, routed to , please call and scheduled  Kerry Hicks RN, BSN  [facility]

## 2022-08-01 NOTE — TELEPHONE ENCOUNTER
2nd attempt at contacting patient, called and LVM to return phone call to clinic in regards to scheduling.     Laurence Avery, CMA

## 2022-08-09 NOTE — TELEPHONE ENCOUNTER
3rd attempt sent letter closing encounter      Ariana Han MA 1:21 PM 8/9/2022     Discharge Summary


Admit Date: 09/25/17


Discharge Date: 09/25/17


Discharging Provider: JUDI LIMA


Code Status: Attempt Resuscitation


Condition at Discharge: Good


Discharge Disposition: 01 Home, Self Care


Discharge Facility Name: HOME





- DIAGNOSES


Admission Diagnoses: 





1. ACUTE RIGHT SIDED WEAKNESS WITH POSSIBLE CVA/TIA


2. ESSENTIAL HYPERTENSION


3. GENERALIZED ANXIETY DISORDER


4. HYPELIPIDEMIA, MIXED


Discharge Diagnoses with Status of Each Condition: 





1. ACUTE RIGHT SIDED WEAKNESS WITH TIA WITH HISTORY OF CVA


2. ESSENTIAL HYPERTENSION


3. GENERALIZED ANXIETY DISORDER


4. HYPELIPIDEMIA, MIXED





- HPI


History of Present Illness: 





Patient is a 57-year-old -American gentleman with a past medical history 

significant for hypertension, hyperlipidemia, Obstructive sleep apnea on CPAP 

CVA in 2011 with multiple TIAs since and history of anxiety who presents to the 

emergency department with a chief complaint of headache and right-sided 

weakness.  The patient states he was in his normal state of health until around 

10 PM this evening when he states he began to notice that his vision was blurry 

and he felt as though his coordination was off.  He states he felt a severe 

headache on the right side of his head going down posteriorly.  He states that 

with that he also noticed some numbness in his right hand and noticed that it 

was weak.  He also states that his left leg was mildly weak but most of the 

weakness was in his right hand and right arm.  The patient states that he does 

use Plavix and Lipitor at home which he is compliant with.  The patient states 

that he has had episodes like this before that to resolve but given that the 

symptoms were not resolving he decided to come into the emergency department.  





Patient otherwise denies any runny nose, sore throat, nasal congestion, fevers, 

chills, shortness of breath, orthopnea, PND, increased lower extremity swelling

, chest pain, abdominal pain, nausea, vomiting, diarrhea, constipation, urinary 

urgency, frequency, dysuria, joint pains, joint swelling, muscle aches, back 

pain, neck stiffness, changes in his appetite or any recent unintentional 

weight loss.





On presentation to the emergency department the patient was afebrile and vital 

signs were within normal limits.  The patient did not appear to be in any acute 

distress but he did have evidence of right sided weakness.  The patient 

underwent a CT scan of his head which showed no acute intracranial process.  

The patient's EKG showed sinus rhythm without any atrial fibrillation.  The 

patient underwent routine lab work which was all within normal limits.  Given 

that the patient's symptoms had not resolved in the emergency department the 

patient was placed in observation for further stroke workup.





- CONSULTS | PROCEDURES


Consultations: physical therapy


Procedures: 





EKG SHOWED SINUS RATE AND NO ISHEMIA





MRI AND MRA OF BRAIN AND NECK PENDING AT TIME OF DISCHARGE-PATIENT TO CALL WITH 

RESULTS





CT OF HEAD AND NECK SHOWED NO ACUTE PROCESS, NO STENOSIS





- HOSPITAL COURSE


Hospital Course: 


HOSPITAL COURSE AND TREATMENT





(1) Right sided weakness


Patient has history of CVA with residual right-sided weakness.  The patient 

presented to the emergency department with a chief complaint of headache and 

worsening right upper greater than lower extremity weakness.  The patient was 

found to have some right upper extremity weakness and mild right lower 

extremity weakness.  The patient's CT head was negative and patient's symptoms 

did not resolve while he was in the emergency department therefore he was 

placed in observation for further workup.  The patient takes Plavix and Lipitor 

at home.


Continue on Aspirin, Plavix, Lipitor, completed the CTA head and neck that was 

negative for acute stroke. completed MRI brain


Echocardiogram completed, Lipid profile completed and showed elevated 

cholesterol, telemetry monitoring and neurochecks Q6. 





(2) Hypertension


Conclusion/Plan: 


Held home blood pressure medication and allowed for permissive hypertension.  

Maintained systolic blood pressure less than 190 systolic and 110 diastolic


   Hypertension type: essential hypertension   Qualified Code(s): I10 - 

Essential (primary) hypertension   





(3) Hyperlipidemia


Conclusion/Plan: 


Continued patient on Lipitor


Checked lipid profile





(4) Anxiety


Conclusion/Plan: 


Continued home dose of Celexa and buspirone


remained Stable





(5) DVT prophylaxis


Conclusion/Plan: 


Placed on SCDs as patient presented with possible CVA therefore anticoagulation 

contraindicated.





- ALLERGIES


Allergies/Adverse Reactions: 


 Allergies











Allergy/AdvReac Type Severity Reaction Status Date / Time


 


No Known Drug Allergies Allergy   Verified 09/25/17 00:14














- MEDICATIONS


Home Medications: 


 Ambulatory Orders











 Medication  Instructions  Recorded  Confirmed


 


Citalopram [CeleXA] 40 mg PO DAILY 03/29/14 09/25/17


 


hydroCHLOROthiazide [Hydrodiuril] 25 mg PO DAILY 03/29/14 09/25/17


 


Atorvastatin [Lipitor] 40 mg PO QPM 06/06/14 09/25/17


 


Clonazepam [Klonopin] 1 mg PO PRN PRN 06/06/14 09/25/17


 


Omega-3 Fatty Acids/Fish Oil [Fish 1,000 mg PO DAILY 06/06/14 09/25/17





Oil Softgel]   


 


Prazosin HCl 2 mg PO DAILY 06/06/14 09/25/17


 


Aspirin [Roberto] 325 mg PO DAILYWM #30 tablet 09/25/17 


 


Buspirone HCl 5 mg PO DAILY 09/25/17 09/25/17


 


Cyclosporine [Restasis] 1 drops TID 09/25/17 09/25/17














- PHYSICAL EXAM AT DISCHARGE


General Appearance: positive: No acute distress, Alert


Eyes Bilateral: positive: Normal inspection, PERRL, EOMI


ENT: positive: ENT inspection nml, Pharynx nml, No signs of dehydration


Neck: positive: Nml inspection, Thyroid nml, No JVD, Trachea midline


Respiratory: positive: Chest non-tender, No respiratory distress, Breath sounds 

nml


Cardiovascular: positive: Regular rate & rhythm, No murmur, No gallop


Peripheral Pulses: positive: 2+


Abdomen: positive: Non-tender, No organomegaly, Nml bowel sounds, No distention


Rectal: positive: Non-tender


Back: positive: Nml inspection


Skin: positive: Color nml, No rash, Warm, Dry


Extremities: positive: Non-tender, Full ROM, Nml appearance


Neurologic/Psychiatric: positive: Oriented x3, CN's nml (2-12), Motor nml, 

Sensation nml, Mood/affect nml





- LABS


Result Diagrams: 


 09/25/17 06:15





 09/25/17 06:15


Other Lab Results: 








 Abnormal Lab Results











  09/25/17 09/25/17





  00:55 06:15


 


Chloride  100 mmol/L L mmol/L  





   (101-111)  


 


Glucose  102 mg/dL H mg/dL  





   ()  


 


HDL Cholesterol    34 mg/dL L mg/dL





   (60 - )














- DIAGNOSTIC IMAGING


Diagnostic Imaging Results: Prelim report reviewed, Final report reviewed





- FOLLOW UP


Follow Up: 





PATIENT WAS INSTRUCTED TO SEE PRIMARY CARE PROVIDER TO SEE A VASCULAR PROVIDER 

RELATED TO THE STENOSIS IN MCA AT 90%. OTHER REPORTS OF CT WERE NEGATIVE FOR 

SIGNIFICANT STENOSIS 





HE WAS INSTRUCTED TO TAKE ASPIRIN 81MG DAILY AND REMAIN ON PLAVIX UNTIL 

PROVIDER CAN SEE HIM





HE WAS INSTRUCTED TO CONTINUE ON A HEART HEALTHY DIET AND DRINK PLENTY OF WATER 

DAILY





INSTRUCTED TO RETURN TO THE ER IF SYMPTOMS WORSEN OR HE HAS CHEST PAIN OR 

SHORTNESS OF BREATH





- TIME SPENT


Time Spent in Discharge (Minutes): 45 (FOR ASSESSMENT AND DISHARGE PLANNING)

## 2022-08-27 ENCOUNTER — OFFICE VISIT (OUTPATIENT)
Dept: URGENT CARE | Facility: URGENT CARE | Age: 22
End: 2022-08-27
Payer: COMMERCIAL

## 2022-08-27 VITALS
BODY MASS INDEX: 30.96 KG/M2 | WEIGHT: 201 LBS | HEART RATE: 101 BPM | SYSTOLIC BLOOD PRESSURE: 109 MMHG | OXYGEN SATURATION: 99 % | DIASTOLIC BLOOD PRESSURE: 70 MMHG | TEMPERATURE: 98.2 F

## 2022-08-27 DIAGNOSIS — J02.9 ACUTE PHARYNGITIS, UNSPECIFIED ETIOLOGY: ICD-10-CM

## 2022-08-27 DIAGNOSIS — R07.0 THROAT PAIN: ICD-10-CM

## 2022-08-27 DIAGNOSIS — J06.9 VIRAL URI: Primary | ICD-10-CM

## 2022-08-27 LAB
DEPRECATED S PYO AG THROAT QL EIA: NEGATIVE
GROUP A STREP BY PCR: NOT DETECTED

## 2022-08-27 PROCEDURE — U0003 INFECTIOUS AGENT DETECTION BY NUCLEIC ACID (DNA OR RNA); SEVERE ACUTE RESPIRATORY SYNDROME CORONAVIRUS 2 (SARS-COV-2) (CORONAVIRUS DISEASE [COVID-19]), AMPLIFIED PROBE TECHNIQUE, MAKING USE OF HIGH THROUGHPUT TECHNOLOGIES AS DESCRIBED BY CMS-2020-01-R: HCPCS | Performed by: PHYSICIAN ASSISTANT

## 2022-08-27 PROCEDURE — U0005 INFEC AGEN DETEC AMPLI PROBE: HCPCS | Performed by: PHYSICIAN ASSISTANT

## 2022-08-27 PROCEDURE — 87651 STREP A DNA AMP PROBE: CPT | Performed by: PHYSICIAN ASSISTANT

## 2022-08-27 PROCEDURE — 99213 OFFICE O/P EST LOW 20 MIN: CPT | Mod: CS | Performed by: PHYSICIAN ASSISTANT

## 2022-08-27 NOTE — PROGRESS NOTES
ASSESSMENT/PLAN:        (J06.9) Viral URI  (primary encounter diagnosis)    MDM: Acute onset sore throat. No findings on exam today to increase my suspicion for peritonsillar abscess or bacterial pharyngitis on exam. At this time, I suspect viral pharyngitis is most likely etiology. Non-specific virus and Covid-19 virus are in the differential.     Plan:    Home comfort care measures.     Encourage extra fluids and soft foods.     Follow-up with primary care or urgent care if no improvement by day 5 of illness, if not resolved in 7 to 10 days,  or sooner if any sudden worsening or if new illness symptoms develop.     Strep PCR and Covid-19 PCR test results pending     (J02.9) Acute pharyngitis, unspecified etiology  Plan: Streptococcus A Rapid Screen w/Reflex to PCR,         Group A Streptococcus PCR Throat Swab        ----------------------    SUBJECTIVE:     Mireya Rodarte a 21 year old female  who presents to  today for evaluation of sore throat. Patient also has also had generalized waxing and waning headache (better today than yesterday) x 24 hours duration.  Patient confirms they are still able to take in good fluids and soft food despite sore throat.      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 Positive malaise. No severe fatigue  HEENT: Positive sore throat as per above HPI. No difficulty talking, swallowing, opening mouth or breathing upon questioning today.  No nasal congestion. No other ENT sxs.   RESP: No acute cough.  No acute wheezing or shortness of breath   GI:  No abdominal pain. No acute N/V/D  SKIN: No acute systemic rash or hives    NEURO: Positive for generalized waxing and waning headache as per above with interval improvement. No severe neck stiffness, photophobia, rash, mental status changes or lethargy.     Past Medical History:   Diagnosis Date      Depression, unspecified depression type 7/12/2016     DIAZ (generalized anxiety disorder) 8/6/2016     Moderate major depression (H) 12/17/2019     Patient Active Problem List   Diagnosis     Right shoulder pain     Depression, unspecified depression type     DIAZ (generalized anxiety disorder)     Dysmenorrhea     Migraine without aura and without status migrainosus, not intractable     Cervical pain     Biceps tendinitis of right upper extremity     Moderate major depression (H)     Moderate major depression (H)         Current Outpatient Medications   Medication     busPIRone (BUSPAR) 15 MG tablet     FLUoxetine (PROZAC) 20 MG capsule     norethindrone-ethinyl estradiol (ADAL FE 1/20) 1-20 MG-MCG tablet     topiramate (TOPAMAX) 100 MG tablet     busPIRone HCl (BUSPAR) 30 MG tablet     triamcinolone (KENALOG) 0.1 % external ointment     No current facility-administered medications for this visit.     No Known Allergies      OBJECTIVE:  /70   Pulse 101   Temp 98.2  F (36.8  C) (Oral)   Wt 91.2 kg (201 lb)   SpO2 99%   BMI 30.96 kg/m              General appearance: alert and no apparent distress  Skin color is pink and without rash.  HEENT:   Conjunctiva not injected.  Sclera clear.  Left TM is normal: no effusions, no erythema, and normal landmarks.  Right TM is normal: no effusions, no erythema, and normal landmarks.  Nasal mucosa is normal.  Oropharyngeal exam is positive for mild erythema.  No asymmetry. Uvula is midline. No trismus. Voice is clear. No lesions, adenopathy, plaque or exudate.  Neck is supple, FROM. No neck stiffness. No adenopathy  CARDIAC:NORMAL - regular rate and rhythm without murmur.  RESP: No increased work of breathing. No retractions. No stridor. Lung fields are clear to ausculation. No rales, rhonchi, or wheezing.  NEURO:Alert and oriented.  Normal speech and mentation.  CN II/XII grossly intact.  Gait within normal limits.          LAB:      Component      Latest Ref Rng &  Units 8/27/2022   Rapid Strep A Screen      Negative Negative        Strep PCR test result is pending      Covid-19 PCR: Patient is pending       (R07.0) Throat pain  Plan: Symptomatic; Unknown COVID-19 Virus         (Coronavirus) by PCR Nose, Streptococcus A         Rapid Screen w/Reflex to PCR - Clinic Collect

## 2022-08-28 ENCOUNTER — OFFICE VISIT (OUTPATIENT)
Dept: URGENT CARE | Facility: URGENT CARE | Age: 22
End: 2022-08-28
Payer: COMMERCIAL

## 2022-08-28 VITALS
TEMPERATURE: 97.9 F | HEART RATE: 80 BPM | OXYGEN SATURATION: 99 % | SYSTOLIC BLOOD PRESSURE: 114 MMHG | DIASTOLIC BLOOD PRESSURE: 80 MMHG

## 2022-08-28 DIAGNOSIS — B08.4 HAND, FOOT AND MOUTH DISEASE: Primary | ICD-10-CM

## 2022-08-28 LAB — SARS-COV-2 RNA RESP QL NAA+PROBE: NEGATIVE

## 2022-08-28 PROCEDURE — 99213 OFFICE O/P EST LOW 20 MIN: CPT | Performed by: FAMILY MEDICINE

## 2022-08-28 NOTE — PROGRESS NOTES
SUBJECTIVE:   Mireya Fritz is a 21 year old female presenting with a chief complaint of a mildly itchy rash on the palms, fingers,  Soles, nose (near the nostrils).  No lesions in the mouth.  No buttock lesions  Onset of symptoms was last night.        Patient was evaluated at the [facility] on August 27, 2022, for sore throat.  Patient presented with 24 hours of a waxing and waning headache (Now the headache has improved).  .  The COVID-19 PCR test, the Rapid Strep test and the Strep PCR Test were all negative.     Past Medical History:   Diagnosis Date     Depression, unspecified depression type 7/12/2016     DIAZ (generalized anxiety disorder) 8/6/2016     Moderate major depression (H) 12/17/2019     Current Outpatient Medications   Medication Sig Dispense Refill     busPIRone (BUSPAR) 15 MG tablet Take 1 tablet (15 mg) by mouth 2 times daily 180 tablet 1     busPIRone HCl (BUSPAR) 30 MG tablet Take 1 tablet (30 mg) by mouth At Bedtime 180 tablet 3     FLUoxetine (PROZAC) 20 MG capsule Take 3 capsules (60 mg) by mouth daily 270 capsule 3     norethindrone-ethinyl estradiol (ADAL FE 1/20) 1-20 MG-MCG tablet Take 1 tablet by mouth daily 84 tablet 1     topiramate (TOPAMAX) 100 MG tablet Take 1 tablet (100 mg) by mouth daily 90 tablet 1     triamcinolone (KENALOG) 0.1 % external ointment Apply topically 2 times daily 30 g 1     Social History     Tobacco Use     Smoking status: Current Every Day Smoker     Types: Other, Vaping Device     Smokeless tobacco: Never Used     Tobacco comment: uses e-cigs   Substance Use Topics     Alcohol use: Yes     Comment: once a every weekend       ROS:  CONSTITUTIONAL:positive for recent fevers.   INTEGUMENTARY/SKIN:  Positive for a new rash.   ENT/MOUTH:  Negative for mouth lesions.      OBJECTIVE:  /80   Pulse 80   Temp 97.9  F (36.6  C) (Tympanic)   SpO2 99%   GENERAL APPEARANCE: healthy, alert and no distress  HENT: Mouth:  No  lesions were seen in the mouth.    SKIN: palms have widely scattered red vesicular lesions with involvement on some fingers The soles have about 2-4 vesicles near the base of some toes.      ASSESSMENT:  Hand-Foot-Mouth Disease.     PLAN:  Stay at home for 7 days.     Wash hands often to prevent spread.    Do not share cups/eating utensils.      Follow up if you cannot swallow liquids and solids, if fevers keep climbing, if you have worsening pain.      Óscar Lux MD

## 2022-08-28 NOTE — LETTER
[facility]  [address] [place]  [address]  [place] [address] [phone]      August 28, 2022    RE:  Mireya Fritz                                                                                                                                                       [id number] [address] [address]            To whom it may concern:    Mireya Fritz is under my professional care at the [facility] on August 28, 2022.  She has Hand, Foot, and Mouth Disease.  To prevent spread at work, please allow Ms. Fritz to stay at home from August 28 to September 5, 2022.  She  may return to work afterwards without any special medical restrictions.            Sincerely,        Óscar Lux MD    [facility]

## 2022-11-03 ENCOUNTER — NURSE TRIAGE (OUTPATIENT)
Dept: PEDIATRICS | Facility: CLINIC | Age: 22
End: 2022-11-03

## 2022-11-03 ENCOUNTER — OFFICE VISIT (OUTPATIENT)
Dept: PEDIATRICS | Facility: CLINIC | Age: 22
End: 2022-11-03
Payer: COMMERCIAL

## 2022-11-03 VITALS
DIASTOLIC BLOOD PRESSURE: 70 MMHG | BODY MASS INDEX: 31.22 KG/M2 | HEIGHT: 68 IN | RESPIRATION RATE: 14 BRPM | OXYGEN SATURATION: 100 % | TEMPERATURE: 98.2 F | HEART RATE: 78 BPM | SYSTOLIC BLOOD PRESSURE: 110 MMHG | WEIGHT: 206 LBS

## 2022-11-03 DIAGNOSIS — M54.9 MID BACK PAIN: Primary | ICD-10-CM

## 2022-11-03 DIAGNOSIS — Z23 HIGH PRIORITY FOR 2019-NCOV VACCINE: ICD-10-CM

## 2022-11-03 DIAGNOSIS — Z23 NEEDS FLU SHOT: ICD-10-CM

## 2022-11-03 DIAGNOSIS — F32.1 MODERATE MAJOR DEPRESSION (H): ICD-10-CM

## 2022-11-03 DIAGNOSIS — R45.851 SUICIDAL IDEATION: ICD-10-CM

## 2022-11-03 LAB
ALBUMIN UR-MCNC: NEGATIVE MG/DL
APPEARANCE UR: CLEAR
BILIRUB UR QL STRIP: NEGATIVE
COLOR UR AUTO: YELLOW
GLUCOSE UR STRIP-MCNC: NEGATIVE MG/DL
HGB UR QL STRIP: NEGATIVE
KETONES UR STRIP-MCNC: NEGATIVE MG/DL
LEUKOCYTE ESTERASE UR QL STRIP: NEGATIVE
NITRATE UR QL: NEGATIVE
PH UR STRIP: 6 [PH] (ref 5–7)
SP GR UR STRIP: 1.01 (ref 1–1.03)
UROBILINOGEN UR STRIP-ACNC: 0.2 E.U./DL

## 2022-11-03 PROCEDURE — 99214 OFFICE O/P EST MOD 30 MIN: CPT | Mod: 25 | Performed by: NURSE PRACTITIONER

## 2022-11-03 PROCEDURE — 0134A COVID-19,PF,MODERNA BIVALENT: CPT | Performed by: NURSE PRACTITIONER

## 2022-11-03 PROCEDURE — 90686 IIV4 VACC NO PRSV 0.5 ML IM: CPT | Performed by: NURSE PRACTITIONER

## 2022-11-03 PROCEDURE — 91313 COVID-19,PF,MODERNA BIVALENT: CPT | Performed by: NURSE PRACTITIONER

## 2022-11-03 PROCEDURE — 81003 URINALYSIS AUTO W/O SCOPE: CPT | Performed by: NURSE PRACTITIONER

## 2022-11-03 PROCEDURE — 96127 BRIEF EMOTIONAL/BEHAV ASSMT: CPT | Performed by: NURSE PRACTITIONER

## 2022-11-03 PROCEDURE — 90471 IMMUNIZATION ADMIN: CPT | Performed by: NURSE PRACTITIONER

## 2022-11-03 ASSESSMENT — PATIENT HEALTH QUESTIONNAIRE - PHQ9
SUM OF ALL RESPONSES TO PHQ QUESTIONS 1-9: 22
10. IF YOU CHECKED OFF ANY PROBLEMS, HOW DIFFICULT HAVE THESE PROBLEMS MADE IT FOR YOU TO DO YOUR WORK, TAKE CARE OF THINGS AT HOME, OR GET ALONG WITH OTHER PEOPLE: VERY DIFFICULT
SUM OF ALL RESPONSES TO PHQ QUESTIONS 1-9: 22

## 2022-11-03 ASSESSMENT — PAIN SCALES - GENERAL: PAINLEVEL: MILD PAIN (2)

## 2022-11-03 NOTE — PROGRESS NOTES
Assessment & Plan     Mid back pain  Seems most likely MSK related. Not c/w kidney stone (pt is specifically concerned about this). If not improving with conservative measures to see PT.  - UA Macro with Reflex to Micro and Culture - lab collect; Future  - UA Macro with Reflex to Micro and Culture - lab collect  - Physical Therapy Referral; Future    Needs flu shot  - INFLUENZA VACCINE IM > 6 MONTHS VALENT IIV4 (AFLURIA/FLUZONE)    High priority for 2019-nCoV vaccine  - COVID-19,PF,MODERNA BIVALENT 18+Yrs    Suicidal ideation  Moderate major depression (H)  Passive. Not new for her. Has therapist but sometimes struggles to make appointments dt busy schedule. Reassures me she has no plans of suicide. Not interseted in med changes today. Should follow up with PCP as scheduled to follow up on this      Depression Screening Follow Up    PHQ 11/3/2022   PHQ-9 Total Score 22   Q9: Thoughts of better off dead/self-harm past 2 weeks Several days   F/U: Thoughts of suicide or self-harm No   F/U: Self harm-plan -   F/U: Self-harm action -   F/U: Safety concerns No     Last PHQ-9 11/3/2022   1.  Little interest or pleasure in doing things 3   2.  Feeling down, depressed, or hopeless 2   3.  Trouble falling or staying asleep, or sleeping too much 3   4.  Feeling tired or having little energy 3   5.  Poor appetite or overeating 3   6.  Feeling bad about yourself 3   7.  Trouble concentrating 3   8.  Moving slowly or restless 1   Q9: Thoughts of better off dead/self-harm past 2 weeks 1   PHQ-9 Total Score 22   Difficulty at work, home, or with people -   In the past two weeks have you had thoughts of suicide or self harm? No   Do you have concerns about your personal safety or the safety of others? No   In the past 2 weeks have you thought about a plan or had intention to harm yourself? -   In the past 2 weeks have you acted on these thoughts in any way? -               Follow Up      Follow Up Actions Taken  Referred patient  back to mental health provider    Discussed the following ways the patient can remain in a safe environment:  be around others  Patient Instructions   Ibuprofen, 600-800 mg take 3 times daily as needed, take with food.    Tylenol 1,000 mg take 3 times daily as needed.    Ice for the next 2-3 days at least 4 times daily for 15-20 minutes.    Stay active. Schedule with PT if not improving.    If not improving, return to the clinic in 10-14 days.         Return in about 2 weeks (around 11/17/2022), or if symptoms worsen or fail to improve.    Jenni Greco NP  [facility] REBECCA Gomes is a 21 year old, presenting for the following health issues:  UTI and Imm/Inj (COVID-19 VACCINE)    Family HX of kidney problems   Some bruising in affected area  Pain getting worse  No UA SX    UTI    History of Present Illness       Back Pain:  She presents for follow up of back pain. Patient's back pain is a new problem.    Original cause of back pain: not sure  First noticed back pain: more than 1 month ago  Patient feels back pain: constantlyLocation of back pain:  Right middle of back  Description of back pain: dull ache  Back pain spreads: nowhere    Since patient first noticed back pain, pain is: rapidly worsening  Does back pain interfere with her job:  No  On a scale of 1-10 (10 being the worst), patient describes pain as:  4  What makes back pain worse: bending, coughing, certain positions, sitting and twisting  Acupuncture: not tried  Acetaminophen: not tried  Activity or exercise: not helpful  Chiropractor:  Not helpful  Cold: helpful  Heat: not tried  Massage: not helpful  Muscle relaxants: not tried  NSAIDS: not tried  Opioids: not tried  Physical Therapy: not tried  Rest: not helpful  Steroid Injection: not tried  Stretching: not helpful  Surgery: not tried  TENS unit: not tried  Topical pain relievers: not tried  Other healthcare providers patient is seeing for back pain:  "Chiropractor    She eats 0-1 servings of fruits and vegetables daily.She consumes 1 sweetened beverage(s) daily.She exercises with enough effort to increase her heart rate 30 to 60 minutes per day.  She exercises with enough effort to increase her heart rate 4 days per week.   She is taking medications regularly.    Today's PHQ-9         PHQ-9 Total Score: 22    PHQ-9 Q9 Thoughts of better off dead/self-harm past 2 weeks :   Several days  Thoughts of suicide or self harm: (P) No  Self-harm Plan:     Self-harm Action:       Safety concerns for self or others: (P) No    How difficult have these problems made it for you to do your work, take care of things at home, or get along with other people: Very difficult      Right sided rib pain, worst with twisting. Ongoing for months.   Chiropractor didn't help.  The past 1-2 weeks feels like pain worsened.  Describes as feeling like she had a knot in the area.  Massage hurt.  Noticed a bruise to the area.  Pain also to back.   Notes with rest, worst with moving.  Denies any urinary symptoms  No lumps to the area  No known injury  Denies dyspnea, chest pain, or abdominal pain. No pain with breathing.    Review of Systems         Objective    /70   Pulse 78   Temp 98.2  F (36.8  C)   Resp 14   Ht 1.716 m (5' 7.56\")   Wt 93.4 kg (206 lb)   SpO2 100%   BMI 31.73 kg/m    Body mass index is 31.73 kg/m .  Physical Exam   GENERAL: healthy, alert and no distress  MSK: No obvious deformity of spine. Localized area of tenderness over right flank/thoracic paraspinal muscles. No underlying nodules or masses. She has x3 small healing bruises over this area as well but no rashes                      "

## 2022-11-03 NOTE — PATIENT INSTRUCTIONS
Ibuprofen, 600-800 mg take 3 times daily as needed, take with food.    Tylenol 1,000 mg take 3 times daily as needed.    Ice for the next 2-3 days at least 4 times daily for 15-20 minutes.    Stay active. Schedule with PT if not improving.    If not improving, return to the clinic in 10-14 days.

## 2022-11-03 NOTE — TELEPHONE ENCOUNTER
S-(situation): Patient calling regarding flank pain.     B-(background): Patient denies any injury or exercise in area.     A-(assessment): Patient experiencing right flank pain, mid back underneath rib cage. Patient notes light bruising in area. Patient states area has been uncomfortable for past few weeks, more uncomfortable and constant in past week. Pain described as dull ache with sharp pains lasting a few seconds, rated as moderate. Pain is waking patient up from sleep and preventing her from sleep. No fever. No abdominal pain. No vomiting. No leg weakness. No burning with urination. Patient believes she had a UTI a couple weeks ago, used monistat and believes she got rid of infection. No blood in urine. No vaginal discharge. No other symptoms noted.     R-(recommendations): Per protocol, advised visit today. Patient scheduled for OV 11/3/22. RN advised if any new/worsening symptoms to contact clinic or be seen in UC.       Reason for Disposition    MODERATE pain (e.g., interferes with normal activities or awakens from sleep)    Additional Information    Negative: Fever > 100.4 F (38.0 C)    Negative: Pain or burning with passing urine (urination)    Negative: Pain radiates into groin, scrotum    Negative: Blood in urine (red, pink, or tea-colored)    Negative: Vomiting    Negative: Weakness of a leg or foot (e.g., unable to bear weight, dragging foot)    Negative: Patient sounds very sick or weak to the triager    Negative: SEVERE pain (e.g., excruciating, scale 8-10) and present > 1 hour    Negative: Sudden onset of severe flank pain and age > 60 years    Negative: Abdominal pain and age > 60 years    Negative: Unable to urinate (or only a few drops) > 4 hours and bladder feels very full (e.g., palpable bladder or strong urge to urinate)    Negative: Followed a major injury to the back (e.g., MVA, fall > 10 feet or 3 meters, penetrating injury, etc.)    Negative: Upper, mid or lower back pain that occurs  "mainly in the midline    Negative: Passed out (i.e., lost consciousness, collapsed and was not responding)    Negative: Shock suspected (e.g., cold/pale/clammy skin, too weak to stand, low BP, rapid pulse)    Negative: Sounds like a life-threatening emergency to the triager    Answer Assessment - Initial Assessment Questions  1. LOCATION: \"Where does it hurt?\" (e.g., left, right)      Right side, flank, back side near ribs.   2. ONSET: \"When did the pain start?\"      Has been uncomfortable for \"awhile\", pain increased for last couple weeks. Pretty uncomfortable for past week.   3. SEVERITY: \"How bad is the pain?\" (e.g., Scale 1-10; mild, moderate, or severe)    - MILD (1-3): doesn't interfere with normal activities     - MODERATE (4-7): interferes with normal activities or awakens from sleep     - SEVERE (8-10): excruciating pain and patient unable to do normal activities (stays in bed)        Moderate. Wakes her up from sleeping and preventing from sleeping. Achy pain, varies from dull to sharp. Sharp pain will last seconds.   4. PATTERN: \"Does the pain come and go, or is it constant?\"       Constant for past week.   5. CAUSE: \"What do you think is causing the pain?\"      Unsure, no injury or exercise to trigger.   6. OTHER SYMPTOMS:  \"Do you have any other symptoms?\" (e.g., fever, abdominal pain, vomiting, leg weakness, burning with urination, blood in urine)      No fever. No abdominal pain. No vomiting. No leg weakness. No burning with urination. Patient believes she had a UTI a couple weeks ago, used monistat and believes she got rid of infection. No blood in urine.   7. PREGNANCY:  \"Is there any chance you are pregnant?\" \"When was your last menstrual period?\"      No, patient takes BC continuously.    Protocols used: FLANK PAIN-A-OH    Christian KONG RN 11/3/2022 at 8:45 AM    "

## 2022-12-01 ENCOUNTER — OFFICE VISIT (OUTPATIENT)
Dept: PEDIATRICS | Facility: CLINIC | Age: 22
End: 2022-12-01
Payer: COMMERCIAL

## 2022-12-01 ENCOUNTER — THERAPY VISIT (OUTPATIENT)
Dept: PHYSICAL THERAPY | Facility: CLINIC | Age: 22
End: 2022-12-01
Attending: NURSE PRACTITIONER
Payer: COMMERCIAL

## 2022-12-01 VITALS
DIASTOLIC BLOOD PRESSURE: 78 MMHG | TEMPERATURE: 98.7 F | SYSTOLIC BLOOD PRESSURE: 110 MMHG | BODY MASS INDEX: 31.52 KG/M2 | HEART RATE: 67 BPM | HEIGHT: 68 IN | WEIGHT: 208 LBS | OXYGEN SATURATION: 98 %

## 2022-12-01 DIAGNOSIS — F41.1 GAD (GENERALIZED ANXIETY DISORDER): ICD-10-CM

## 2022-12-01 DIAGNOSIS — M54.50 CHRONIC RIGHT-SIDED LOW BACK PAIN WITHOUT SCIATICA: Primary | ICD-10-CM

## 2022-12-01 DIAGNOSIS — G43.009 MIGRAINE WITHOUT AURA AND WITHOUT STATUS MIGRAINOSUS, NOT INTRACTABLE: ICD-10-CM

## 2022-12-01 DIAGNOSIS — G89.29 CHRONIC RIGHT-SIDED LOW BACK PAIN WITHOUT SCIATICA: Primary | ICD-10-CM

## 2022-12-01 DIAGNOSIS — F33.2 SEVERE EPISODE OF RECURRENT MAJOR DEPRESSIVE DISORDER, WITHOUT PSYCHOTIC FEATURES (H): Primary | ICD-10-CM

## 2022-12-01 DIAGNOSIS — M54.9 MID BACK PAIN: ICD-10-CM

## 2022-12-01 DIAGNOSIS — N94.6 DYSMENORRHEA: ICD-10-CM

## 2022-12-01 PROBLEM — M75.21 BICEPS TENDINITIS OF RIGHT UPPER EXTREMITY: Status: RESOLVED | Noted: 2019-06-21 | Resolved: 2022-12-01

## 2022-12-01 PROCEDURE — 99214 OFFICE O/P EST MOD 30 MIN: CPT | Performed by: PEDIATRICS

## 2022-12-01 PROCEDURE — 97161 PT EVAL LOW COMPLEX 20 MIN: CPT | Mod: GP | Performed by: PHYSICAL THERAPIST

## 2022-12-01 PROCEDURE — 97110 THERAPEUTIC EXERCISES: CPT | Mod: GP | Performed by: PHYSICAL THERAPIST

## 2022-12-01 RX ORDER — PROCHLORPERAZINE MALEATE 10 MG
10 TABLET ORAL EVERY 6 HOURS PRN
Qty: 60 TABLET | Refills: 3 | Status: SHIPPED | OUTPATIENT
Start: 2022-12-01 | End: 2023-03-15

## 2022-12-01 RX ORDER — BUSPIRONE HYDROCHLORIDE 15 MG/1
30 TABLET ORAL 2 TIMES DAILY
Qty: 180 TABLET | Refills: 3 | Status: SHIPPED | OUTPATIENT
Start: 2022-12-01 | End: 2022-12-01

## 2022-12-01 RX ORDER — NORETHINDRONE ACETATE AND ETHINYL ESTRADIOL 1MG-20(21)
1 KIT ORAL DAILY
Qty: 84 TABLET | Refills: 4 | Status: SHIPPED | OUTPATIENT
Start: 2022-12-01 | End: 2023-11-09

## 2022-12-01 RX ORDER — BUSPIRONE HYDROCHLORIDE 30 MG/1
30 TABLET ORAL 2 TIMES DAILY
Qty: 180 TABLET | Refills: 3 | Status: SHIPPED | OUTPATIENT
Start: 2022-12-01 | End: 2023-12-15

## 2022-12-01 RX ORDER — TOPIRAMATE 100 MG/1
100 TABLET, FILM COATED ORAL DAILY
Qty: 90 TABLET | Refills: 3 | Status: SHIPPED | OUTPATIENT
Start: 2022-12-01 | End: 2023-06-14

## 2022-12-01 ASSESSMENT — PATIENT HEALTH QUESTIONNAIRE - PHQ9
SUM OF ALL RESPONSES TO PHQ QUESTIONS 1-9: 19
SUM OF ALL RESPONSES TO PHQ QUESTIONS 1-9: 19
10. IF YOU CHECKED OFF ANY PROBLEMS, HOW DIFFICULT HAVE THESE PROBLEMS MADE IT FOR YOU TO DO YOUR WORK, TAKE CARE OF THINGS AT HOME, OR GET ALONG WITH OTHER PEOPLE: VERY DIFFICULT

## 2022-12-01 ASSESSMENT — PAIN SCALES - GENERAL: PAINLEVEL: MODERATE PAIN (4)

## 2022-12-01 NOTE — PATIENT INSTRUCTIONS
Increase fluoxetine dosing to 4 capsules (80mg)    Get a SAD light!!!    Buspar - start taking 30mg at night    Vit D (5000 international unit(s) daily for a couple months and then drop down to 1000 international unit(s) daily) and MVI with iron     Send me an update with how you like the change in meds in 3ish weeks    Love the gym after work - try it as least a couple times per week    Expect a phone call to schedule with psychiatry

## 2022-12-01 NOTE — PROGRESS NOTES
"  Assessment & Plan       ICD-10-CM    1. Severe episode of recurrent major depressive disorder, without psychotic features (H)  F33.2 Adult Mental Health  Referral     FLUoxetine (PROZAC) 20 MG capsule    Remains suboptimally controlled with chronic passive SI.   Following regularly with her therapist.  Plan psychiatry referral for help with medication.    Will increase fluoxetine to 80mg, which patient has tolerated historically.   Crisis planning reviewed.        2. DIAZ (generalized anxiety disorder)  F41.1 Adult Mental Health  Referral     FLUoxetine (PROZAC) 20 MG capsule     busPIRone HCl (BUSPAR) 30 MG tablet       See plan above, psychiatry referral, plan to increase buspar dosing to 30mg - usually only takes once daily at night      3. Dysmenorrhea  N94.6 norethindrone-ethinyl estradiol (ADAL FE 1/20) 1-20 MG-MCG tablet  Well controlled, continue current medications        4. Migraine without aura and without status migrainosus, not intractable  G43.009 topiramate (TOPAMAX) 100 MG tablet     prochlorperazine (COMPAZINE) 10 MG tablet    Well controlled, continue current medications  Discussed potential for birth defects with topamax and needing to stay on OCP while using.                    BMI:   Estimated body mass index is 32.04 kg/m  as calculated from the following:    Height as of this encounter: 1.716 m (5' 7.56\").    Weight as of this encounter: 94.3 kg (208 lb).   Weight management plan: Discussed healthy diet and exercise guidelines    Depression Screening Follow Up    PHQ 12/1/2022   PHQ-9 Total Score 19   Q9: Thoughts of better off dead/self-harm past 2 weeks Several days   F/U: Thoughts of suicide or self-harm No   F/U: Self harm-plan -   F/U: Self-harm action -   F/U: Safety concerns No       Follow Up      Follow Up Actions Taken  Referred patient back to mental health provider  Mental Health Referral placed      Return in about 3 weeks (around 12/22/2022) for Follow up, " with me, using IT[employer] message.    Lydia Rodriguez MD  [facility] REBECCA Gomes is a 22 year old, presenting for the following health issues:   Follow Up and Recheck Medication      History of Present Illness       Reason for visit:  Medications    She eats 0-1 servings of fruits and vegetables daily.She consumes 1 sweetened beverage(s) daily.She exercises with enough effort to increase her heart rate 30 to 60 minutes per day.  She exercises with enough effort to increase her heart rate 3 or less days per week.   She is taking medications regularly.    Today's PHQ-9         PHQ-9 Total Score: 19    PHQ-9 Q9 Thoughts of better off dead/self-harm past 2 weeks :   Several days  Thoughts of suicide or self harm: (P) No  Self-harm Plan:     Self-harm Action:       Safety concerns for self or others: (P) No    How difficult have these problems made it for you to do your work, take care of things at home, or get along with other people: Very difficult    Depression - control has not been great - very busy with work and graduate school programming.   Little down time.    Has passive thoughts of not being alive, but no active plan for self harm.   Tolerating current medications well.       Graduated from [employer] school right now.  [employer].  Working at [employer] in [place].       Still seeing Aminta for therapy every week    PHQ 4/29/2022 11/3/2022 12/1/2022   PHQ-9 Total Score 11 22 19   Q9: Thoughts of better off dead/self-harm past 2 weeks Not at all Several days Several days   F/U: Thoughts of suicide or self-harm - No No   F/U: Self harm-plan - - -   F/U: Self-harm action - - -   F/U: Safety concerns - No No     DIAZ-7 SCORE 10/21/2021 4/22/2022 4/29/2022   Total Score - 8 (mild anxiety) 8 (mild anxiety)   Total Score 12 8 8       Tired all the time.  Irritable and more angry.       Migraines - less frequent, no new neurologic symptoms.   "Was evaluated by neurology at [place] and now on topamax.    [facility]    Combined OCP helping with periods - taking continuously              Objective    /78 (BP Location: Right arm, Patient Position: Sitting, Cuff Size: Adult Regular)   Pulse 67   Temp 98.7  F (37.1  C) (Tympanic)   Ht 1.716 m (5' 7.56\")   Wt 94.3 kg (208 lb)   SpO2 98%   BMI 32.04 kg/m    Body mass index is 32.04 kg/m .  Physical Exam   GENERAL: healthy, alert and no distress  CV: regular rate and rhythm, normal S1 S2, no S3 or S4, no murmur, click or rub, no peripheral edema and peripheral pulses strong  NEURO: Normal strength and tone, mentation intact and speech normal  PSYCH: mentation appears normal, affect normal/bright        Lydia Rodriguez MD              "

## 2022-12-01 NOTE — PROGRESS NOTES
Physical Therapy Initial Evaluation  Subjective:  The history is provided by the patient. No  was used.   Patient Health History  Mireya Fritz being seen for right mid back.     Date of Onset: MD order 11/3/2022.   Problem occurred: unknown   Pain is reported as 4/10 on pain scale.  General health as reported by patient is good.  Pertinent medical history includes: depression, mental illness, migraines/headaches, overweight and smoking.                   Primary job tasks include:  Prolonged sitting and computer work.                  Therapist Generated HPI Evaluation  Problem details: Reports insidious onset R sided thoracic back pain that occasionally shoots into the R lumbar spine. Pain increases when twisting to the left or side bending either direction. Has trouble finding a comfortable position to sit and lay down in, as a result, has trouble falling asleep.    Elliptical and core work-outs, limited recently due to busy schedule and pain..         Type of problem:  Thoracic.    This is a chronic condition.  Condition occurred with:  Insidious onset.  Where condition occurred: for unknown reasons.  Patient reports pain:  Lumbar spine right, lower thoracic spine, thoracic spine right, upper lumbar spine and lower lumbar spine.  Pain is described as sharp and is intermittent.  Pain radiates to:  No radiation. Pain is worse during the night.  Since onset symptoms are unchanged.  Symptoms are exacerbated by lying down, sitting, twisting and other (side bending)  and relieved by ice.    Previous treatment includes chiropractic. There was none improvement following previous treatment.                          Objective:  Standing Alignment:        Lumbar:  Lordosis decr (slouch posture)                Flexibility/Screens:       Lower Extremity:  Decreased left lower extremity flexibility:Hamstrings    Decreased right lower extremity flexibility:  Hamstrings  Spine:      Decreased right  spine flexibility:  Quadratus Lumborum             Lumbar/SI Evaluation  ROM:  Arom wnl lumbar: REIL: improves ROM, centralzies to R LB.  AROM Lumbar:   Flexion:            Midshins  Ext:                    10% painful   Side Bend:        Left:  Distal thigh    Right:  Proximal shin  Rotation:           Left:  75%    Right:  100%  Side Glide:        Left:     Right:       AROM Thoracic:  Flex:               100%  Ext:                25%  Rotation:        Left:  75%    Right:  75%       Lumbar Myotomes:  normal                  Neural Tension/Mobility:    Left side:  Femoral Nerve positive.  Left side:SLR w/DF  negative.   Right side:   SLR w/DF and Femoral Nerve positive.  Lumbar Palpation:      Tenderness present at Right: Quadratus Lumborum; Erector Spinae and PSIS  Tenderness not present at Right:  Iliac Crest or Greater Trochanter                                          Hip Evaluation    Hip Strength:      Extension:  Left: 4+/5  Pain:Right: 4+/5    Pain:    Abduction:  Left: 5/5     Pain:Right: 5-/5    Pain:                                 General     ROS    Assessment/Plan:    Patient is a 22 year old female with thoracic and lumbar complaints.    Patient has the following significant findings with corresponding treatment plan.                Diagnosis 1:  R sided mid to low back pain  Pain -  hot/cold therapy, manual therapy, education, directional preference exercise and home program  Decreased ROM/flexibility - manual therapy and therapeutic exercise  Decreased strength - therapeutic exercise and therapeutic activities  Impaired muscle performance - neuro re-education  Decreased function - therapeutic activities  Impaired posture - neuro re-education    Therapy Evaluation Codes:   1) History comprised of:   Personal factors that impact the plan of care:      Time since onset of symptoms.    Comorbidity factors that impact the plan of care are:      Depression, Mental illness, Migraines/headaches,  Overweight, Pain at night/rest and Smoking.     Medications impacting care: Anti-depressant.  2) Examination of Body Systems comprised of:   Body structures and functions that impact the plan of care:      Lumbar spine and Thoracic Spine.   Activity limitations that impact the plan of care are:      Reading/Computer work, Sitting, Sleeping and Laying down.  3) Clinical presentation characteristics are:   Stable/Uncomplicated.  4) Decision-Making    Low complexity using standardized patient assessment instrument and/or measureable assessment of functional outcome.  Cumulative Therapy Evaluation is: Low complexity.    Previous and current functional limitations:  (See Goal Flow Sheet for this information)    Short term and Long term goals: (See Goal Flow Sheet for this information)     Communication ability:  Patient appears to be able to clearly communicate and understand verbal and written communication and follow directions correctly.  Treatment Explanation - The following has been discussed with the patient:   RX ordered/plan of care  Anticipated outcomes  Possible risks and side effects  This patient would benefit from PT intervention to resume normal activities.   Rehab potential is good.    Frequency:  2 X a month, once daily  Duration:  for 12 weeks (extended POC due to busy clinic schedule)  Discharge Plan:  Achieve all LTG.  Independent in home treatment program.  Reach maximal therapeutic benefit.    Please refer to the daily flowsheet for treatment today, total treatment time and time spent performing 1:1 timed codes.

## 2023-01-17 PROBLEM — M54.50 CHRONIC RIGHT-SIDED LOW BACK PAIN WITHOUT SCIATICA: Status: RESOLVED | Noted: 2022-12-01 | Resolved: 2023-01-17

## 2023-01-17 PROBLEM — G89.29 CHRONIC RIGHT-SIDED LOW BACK PAIN WITHOUT SCIATICA: Status: RESOLVED | Noted: 2022-12-01 | Resolved: 2023-01-17

## 2023-02-01 ENCOUNTER — MYC MEDICAL ADVICE (OUTPATIENT)
Dept: PEDIATRICS | Facility: CLINIC | Age: 23
End: 2023-02-01
Payer: COMMERCIAL

## 2023-02-01 NOTE — TELEPHONE ENCOUNTER
Call placed to pt to get more information  Pt states this has been happening for a few months but has gotten worse  The dizziness happens mostly when working out    Pt had an increase in her fluoxetine on 12/1/2022 but she does not feel it is related since she has been on this dose in the past  Topiramate is not new- been on that for 2 years    Appt made for 2/2/23   Advised to pt when to seek ER care    Pt verbalized understanding and agrees to the plan    Kassie Moreno RN on 2/1/2023 at 3:44 PM

## 2023-02-02 ENCOUNTER — OFFICE VISIT (OUTPATIENT)
Dept: PEDIATRICS | Facility: CLINIC | Age: 23
End: 2023-02-02
Payer: COMMERCIAL

## 2023-02-02 VITALS
HEIGHT: 68 IN | OXYGEN SATURATION: 98 % | TEMPERATURE: 98.2 F | WEIGHT: 218.2 LBS | BODY MASS INDEX: 33.07 KG/M2 | HEART RATE: 90 BPM | SYSTOLIC BLOOD PRESSURE: 120 MMHG | DIASTOLIC BLOOD PRESSURE: 80 MMHG

## 2023-02-02 DIAGNOSIS — R42 DIZZINESS: Primary | ICD-10-CM

## 2023-02-02 DIAGNOSIS — Z11.3 SCREENING FOR STDS (SEXUALLY TRANSMITTED DISEASES): ICD-10-CM

## 2023-02-02 DIAGNOSIS — F33.2 SEVERE EPISODE OF RECURRENT MAJOR DEPRESSIVE DISORDER, WITHOUT PSYCHOTIC FEATURES (H): ICD-10-CM

## 2023-02-02 DIAGNOSIS — G43.009 MIGRAINE WITHOUT AURA AND WITHOUT STATUS MIGRAINOSUS, NOT INTRACTABLE: ICD-10-CM

## 2023-02-02 LAB
ERYTHROCYTE [DISTWIDTH] IN BLOOD BY AUTOMATED COUNT: 12.3 % (ref 10–15)
HCG UR QL: NEGATIVE
HCT VFR BLD AUTO: 39.2 % (ref 35–47)
HGB BLD-MCNC: 13.4 G/DL (ref 11.7–15.7)
MCH RBC QN AUTO: 30.7 PG (ref 26.5–33)
MCHC RBC AUTO-ENTMCNC: 34.2 G/DL (ref 31.5–36.5)
MCV RBC AUTO: 90 FL (ref 78–100)
PLATELET # BLD AUTO: 243 10E3/UL (ref 150–450)
RBC # BLD AUTO: 4.37 10E6/UL (ref 3.8–5.2)
TSH SERPL DL<=0.005 MIU/L-ACNC: 1.71 UIU/ML (ref 0.3–4.2)
WBC # BLD AUTO: 7.3 10E3/UL (ref 4–11)

## 2023-02-02 PROCEDURE — 87491 CHLMYD TRACH DNA AMP PROBE: CPT | Performed by: NURSE PRACTITIONER

## 2023-02-02 PROCEDURE — 87591 N.GONORRHOEAE DNA AMP PROB: CPT | Performed by: NURSE PRACTITIONER

## 2023-02-02 PROCEDURE — 86780 TREPONEMA PALLIDUM: CPT | Performed by: NURSE PRACTITIONER

## 2023-02-02 PROCEDURE — 84443 ASSAY THYROID STIM HORMONE: CPT | Performed by: NURSE PRACTITIONER

## 2023-02-02 PROCEDURE — 86803 HEPATITIS C AB TEST: CPT | Performed by: NURSE PRACTITIONER

## 2023-02-02 PROCEDURE — 87389 HIV-1 AG W/HIV-1&-2 AB AG IA: CPT | Performed by: NURSE PRACTITIONER

## 2023-02-02 PROCEDURE — 81025 URINE PREGNANCY TEST: CPT | Performed by: NURSE PRACTITIONER

## 2023-02-02 PROCEDURE — 85027 COMPLETE CBC AUTOMATED: CPT | Performed by: NURSE PRACTITIONER

## 2023-02-02 PROCEDURE — 36415 COLL VENOUS BLD VENIPUNCTURE: CPT | Performed by: NURSE PRACTITIONER

## 2023-02-02 PROCEDURE — 99214 OFFICE O/P EST MOD 30 MIN: CPT | Performed by: NURSE PRACTITIONER

## 2023-02-02 RX ORDER — MECLIZINE HYDROCHLORIDE 25 MG/1
25 TABLET ORAL 3 TIMES DAILY PRN
Qty: 20 TABLET | Refills: 0 | Status: SHIPPED | OUTPATIENT
Start: 2023-02-02 | End: 2023-03-15

## 2023-02-02 RX ORDER — NORETHINDRONE ACETATE AND ETHINYL ESTRADIOL 1MG-20(21)
1 KIT ORAL DAILY
Qty: 84 TABLET | Refills: 4 | Status: CANCELLED | OUTPATIENT
Start: 2023-02-02

## 2023-02-02 ASSESSMENT — PATIENT HEALTH QUESTIONNAIRE - PHQ9
10. IF YOU CHECKED OFF ANY PROBLEMS, HOW DIFFICULT HAVE THESE PROBLEMS MADE IT FOR YOU TO DO YOUR WORK, TAKE CARE OF THINGS AT HOME, OR GET ALONG WITH OTHER PEOPLE: SOMEWHAT DIFFICULT
SUM OF ALL RESPONSES TO PHQ QUESTIONS 1-9: 16
SUM OF ALL RESPONSES TO PHQ QUESTIONS 1-9: 16

## 2023-02-02 ASSESSMENT — PAIN SCALES - GENERAL: PAINLEVEL: NO PAIN (1)

## 2023-02-02 NOTE — PROGRESS NOTES
Assessment & Plan     Dizziness  No red flag symptoms identified. Rule out anemia, thyroid dysfunction, pregnancy. No evidence of orthostatic hypotension/tachycardia although did report dizziness with position changes. Cardiac and neuro exam unremarkable. Trial of meclizine. Referral to neurology (see below). I do wonder about the possibility of buspirone contributing to her symptoms. She is taking 60 mg at bedtime, can't tell me exactly how long she has been taking it this way. Would typically expect no more than 30 mg in one single dose. She could try decreasing buspirone back down to 30 mg and monitor for symptom improvement.   - CBC with platelets  - TSH with free T4 reflex  - HCG qualitative urine  - Orthostatic blood pressure and pulse  - Adult Neurology  Referral; Future  - meclizine (ANTIVERT) 25 MG tablet; Take 1 tablet (25 mg) by mouth 3 times daily as needed for dizziness    Migraine without aura and without status migrainosus, not intractable  No red flag symptoms identified today. Neuro exam unremarkable. Has seen neuro in the past ([facility]) at which time she was started on topiramate. Given her persistent lightheadedness and change to headache pattern, advised follow-up with neurology.   - Adult Neurology  Referral; Future    Screening for STDs (sexually transmitted diseases)  Denies any known exposures or s/sx of STI.   - NEISSERIA GONORRHOEA PCR  - CHLAMYDIA TRACHOMATIS PCR  - Treponema Abs w Reflex to RPR and Titer  - Hepatitis C Screen Reflex to HCV RNA Quant and Genotype  - HIV Antigen Antibody Combo    Severe episode of recurrent major depressive disorder, without psychotic features (H)  Reviewed PHQ-9 score today. Pt reports her mood is at her baseline. No safety concerns. She is taking fluoxetine as prescribed, discussed buspirone dose as outlined above.       35 minutes spent on the date of the encounter doing chart review, patient visit and documentation        Depression  Screening Follow Up    PHQ 2/2/2023   PHQ-9 Total Score 16   Q9: Thoughts of better off dead/self-harm past 2 weeks Several days   F/U: Thoughts of suicide or self-harm No   F/U: Self harm-plan -   F/U: Self-harm action -   F/U: Safety concerns No       Follow Up  Follow Up Actions Taken  Crisis resource information provided in the After Visit Summary    Follow-up: Lab results pending, will follow-up as indicated after reviewing results.     [facility] REBECCA Gomes is a 22 year old, presenting for the following health issues:  Dizziness    Presents reporting a 1 month history of dizziness, lightheadedness, nausea, headaches.     Dizzy and lightheaded throughout the day with a mild pain to right temple and behind her eyes. Symptoms are intermittent, however there are occasional periods of symptoms being constant. Associated with nausea. Denies sensation that room is spinning. Denies vision changes. Eye exam up to date. Has continued to eat regularly and drink fluids. Sometimes pain in her temple progresses into a migraine but other times it doesn't. Denies heart palpitations. Denies s/sx bleeding. Saw neuro once in the past, they started topiramate, reports abortive therapy has not been effective for her in the past.     Takes combined oral contraceptives. Denies aura.     Hx of low iron, started taking an iron supplement over the past month.     Patient was recently evaluated in  for migraines (1/31). Evaluation in the ED was negative.     Sleeping pretty well, does wake up tired. Will wake a night to use the bathroom but overall sleep is not an issue. Getting at least 9 hours/night.   Rare alcohol use. Smokes delta 8, otherwise no drug use.   Sexually active.  In grad school and working full time.   Recently got a new job. Still training, going pretty well. 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   School is going well, easier semester as she is only taking two  "classes.   Denies significant increases to stress levels.   Depression management is at her baseline.     Of note, reports taking 60 mg buspirone at bedtime. She isn't sure how long she has been taking it this way. According to last OV with PCP 12/1, buspirone was increased to 30 mg at bedtime. She has a hard time remember to take medication more than once daily.     Patient Active Problem List   Diagnosis     Right shoulder pain     Depression, unspecified depression type     DIAZ (generalized anxiety disorder)     Dysmenorrhea     Migraine without aura and without status migrainosus, not intractable     Cervical pain     Moderate major depression (H)     Moderate major depression (H)     Past Medical History:   Diagnosis Date     Depression, unspecified depression type 7/12/2016     DIAZ (generalized anxiety disorder) 8/6/2016     Moderate major depression (H) 12/17/2019     Current Outpatient Medications   Medication     busPIRone HCl (BUSPAR) 30 MG tablet     FLUoxetine (PROZAC) 20 MG capsule     norethindrone-ethinyl estradiol (ADAL FE 1/20) 1-20 MG-MCG tablet     topiramate (TOPAMAX) 100 MG tablet     busPIRone HCl (BUSPAR) 30 MG tablet     prochlorperazine (COMPAZINE) 10 MG tablet     No current facility-administered medications for this visit.      No Known Allergies    Review of Systems    ROS: 10 point ROS neg other than the symptoms noted above in the HPI.        Objective    /80 (BP Location: Right arm, Patient Position: Sitting, Cuff Size: Adult Regular)   Pulse 90   Temp 98.2  F (36.8  C) (Tympanic)   Ht 1.716 m (5' 7.56\")   Wt 99 kg (218 lb 3.2 oz)   SpO2 98%   BMI 33.61 kg/m    Body mass index is 33.61 kg/m .  Physical Exam  Constitutional:       General: She is not in acute distress.     Appearance: Normal appearance. She is not ill-appearing or toxic-appearing.   HENT:      Right Ear: Tympanic membrane and ear canal normal.      Left Ear: Tympanic membrane and ear canal normal.      " Mouth/Throat:      Mouth: Mucous membranes are moist.   Eyes:      Conjunctiva/sclera: Conjunctivae normal.      Pupils: Pupils are equal, round, and reactive to light.   Neck:      Thyroid: No thyroid mass or thyromegaly.   Cardiovascular:      Rate and Rhythm: Normal rate and regular rhythm.      Heart sounds: Normal heart sounds. No murmur heard.    No friction rub. No gallop.   Pulmonary:      Effort: Pulmonary effort is normal. No respiratory distress.   Skin:     General: Skin is warm and dry.   Neurological:      General: No focal deficit present.      Mental Status: She is alert and oriented to person, place, and time. Mental status is at baseline.      Cranial Nerves: Cranial nerve deficit present.      Coordination: Coordination normal.   Psychiatric:         Mood and Affect: Mood normal.         Behavior: Behavior normal.

## 2023-02-03 LAB
C TRACH DNA SPEC QL NAA+PROBE: NEGATIVE
HCV AB SERPL QL IA: NONREACTIVE
HIV 1+2 AB+HIV1 P24 AG SERPL QL IA: NONREACTIVE
N GONORRHOEA DNA SPEC QL NAA+PROBE: NEGATIVE
T PALLIDUM AB SER QL: NONREACTIVE

## 2023-02-27 ENCOUNTER — HOSPITAL ENCOUNTER (EMERGENCY)
Facility: CLINIC | Age: 23
Discharge: HOME | End: 2023-02-27
Attending: EMERGENCY MEDICINE | Admitting: EMERGENCY MEDICINE
Payer: COMMERCIAL

## 2023-02-27 VITALS
OXYGEN SATURATION: 99 % | HEART RATE: 96 BPM | RESPIRATION RATE: 16 BRPM | SYSTOLIC BLOOD PRESSURE: 134 MMHG | TEMPERATURE: 97.4 F | DIASTOLIC BLOOD PRESSURE: 90 MMHG

## 2023-02-27 DIAGNOSIS — R51.9 FRONTAL HEADACHE: ICD-10-CM

## 2023-02-27 PROCEDURE — 250N000011 HC RX IP 250 OP 636: Performed by: EMERGENCY MEDICINE

## 2023-02-27 PROCEDURE — 258N000003 HC RX IP 258 OP 636: Performed by: EMERGENCY MEDICINE

## 2023-02-27 PROCEDURE — 99284 EMERGENCY DEPT VISIT MOD MDM: CPT | Mod: 25

## 2023-02-27 PROCEDURE — 96361 HYDRATE IV INFUSION ADD-ON: CPT

## 2023-02-27 PROCEDURE — 96360 HYDRATION IV INFUSION INIT: CPT

## 2023-02-27 RX ORDER — DIPHENHYDRAMINE HYDROCHLORIDE 50 MG/ML
25 INJECTION INTRAMUSCULAR; INTRAVENOUS ONCE
Status: COMPLETED | OUTPATIENT
Start: 2023-02-27 | End: 2023-02-27

## 2023-02-27 RX ORDER — METOCLOPRAMIDE HYDROCHLORIDE 5 MG/ML
10 INJECTION INTRAMUSCULAR; INTRAVENOUS ONCE
Status: COMPLETED | OUTPATIENT
Start: 2023-02-27 | End: 2023-02-27

## 2023-02-27 RX ORDER — KETOROLAC TROMETHAMINE 15 MG/ML
15 INJECTION, SOLUTION INTRAMUSCULAR; INTRAVENOUS ONCE
Status: COMPLETED | OUTPATIENT
Start: 2023-02-27 | End: 2023-02-27

## 2023-02-27 RX ADMIN — KETOROLAC TROMETHAMINE 15 MG: 15 INJECTION, SOLUTION INTRAMUSCULAR; INTRAVENOUS at 21:47

## 2023-02-27 RX ADMIN — SODIUM CHLORIDE 1000 ML: 9 INJECTION, SOLUTION INTRAVENOUS at 21:47

## 2023-02-27 RX ADMIN — DIPHENHYDRAMINE HYDROCHLORIDE 25 MG: 50 INJECTION, SOLUTION INTRAMUSCULAR; INTRAVENOUS at 21:48

## 2023-02-27 RX ADMIN — METOCLOPRAMIDE HYDROCHLORIDE 10 MG: 5 INJECTION INTRAMUSCULAR; INTRAVENOUS at 21:47

## 2023-02-27 ASSESSMENT — ENCOUNTER SYMPTOMS
ABDOMINAL PAIN: 0
SEIZURES: 0
FACIAL ASYMMETRY: 0
HEADACHES: 1
FEVER: 0
SHORTNESS OF BREATH: 0
SPEECH DIFFICULTY: 0
WEAKNESS: 0
ACTIVITY CHANGE: 0

## 2023-02-27 ASSESSMENT — ACTIVITIES OF DAILY LIVING (ADL): ADLS_ACUITY_SCORE: 35

## 2023-02-28 NOTE — ED NOTES
Rapid Assessment Note    History:   Mireya Fritz is a 22 year old female who presents with onset of bifrontal headache around 1pm today. No head injury. No fever. Patient had recent URI symptoms which have now resolved. No numbness, tingling, or weakness. Blurry vision in right eye since onset of headache.     Exam:   General:  Alert, interactive  Cardiovascular:  Well perfused  Lungs:  No respiratory distress, no accessory muscle use  Neuro:  Moving all 4 extremities  Skin:  Warm, dry  Psych:  Normal affect      Plan of Care:   I evaluated the patient and developed an initial plan of care. I discussed this plan and explained that I, or one of my partners, would be returning to complete the evaluation.         2/27/2023  [employer]    Portions of this medical record were completed by a scribe. UPON MY REVIEW AND AUTHENTICATION BY ELECTRONIC SIGNATURE, this confirms (a) I performed the applicable clinical services, and (b) the record is accurate.        George Frederick MD  02/27/23 0745

## 2023-02-28 NOTE — ED TRIAGE NOTES
"Migriane began around 1300. Has history of migraines. Also feels nauseous & dizzy. Pt took toperamade last night. Pt takes she took 2 excedrin \"sometime in the afternoon, I don't remember.\" Pt endorses right eye blurriness that has happened in the past with migraines.       "

## 2023-02-28 NOTE — ED PROVIDER NOTES
History     Chief Complaint:  Headache       HPI   Mireya Fritz is a 22 year old female who presents to the Emergency Department for evaluation of onset of bifrontal headache around 1pm today. No head injury. No fever. Patient had recent URI symptoms which have now resolved. No numbness, tingling, or weakness. Blurry vision in right eye since onset of headache. Patient reports frequent headaches and this is similar to prior including the vision changes. She denies any other symptoms. Excedrin sometimes helps but other times she must wait for the headache to resolve on its own.      Independent Historian:   None - Patient Only    Review of External Notes: none     ROS:  Review of Systems   Constitutional: Negative for activity change and fever.   Respiratory: Negative for shortness of breath.    Cardiovascular: Negative for chest pain.   Gastrointestinal: Negative for abdominal pain.   Neurological: Positive for headaches. Negative for seizures, syncope, facial asymmetry, speech difficulty and weakness.   All other systems reviewed and are negative.      Allergies:  No Known Allergies     Medications:    busPIRone HCl (BUSPAR) 30 MG tablet  FLUoxetine (PROZAC) 20 MG capsule  meclizine (ANTIVERT) 25 MG tablet  norethindrone-ethinyl estradiol (ADAL FE 1/20) 1-20 MG-MCG tablet  prochlorperazine (COMPAZINE) 10 MG tablet  topiramate (TOPAMAX) 100 MG tablet        Past Medical History:    Past Medical History:   Diagnosis Date     Depression, unspecified depression type 7/12/2016     DIAZ (generalized anxiety disorder) 8/6/2016     Moderate major depression (H) 12/17/2019       Past Surgical History:    Past Surgical History:   Procedure Laterality Date     NO HISTORY OF SURGERY          Family History:    family history includes Breast Cancer in an other family member; Rheumatologic Disease in her mother.    Social History:   reports that she has been smoking other and vaping device. She has never used smokeless  tobacco. She reports current alcohol use. She reports that she does not use drugs.  PCP: Lydia Rodriguez     Physical Exam     Patient Vitals for the past 24 hrs:   BP Temp Temp src Pulse Resp SpO2   02/27/23 2337 -- -- -- -- 16 --   02/27/23 2004 (!) 134/90 97.4  F (36.3  C) Temporal 96 20 99 %        Physical Exam  General: Well appearing, nontoxic. Resting comfortably  Head:  Scalp, face, and head appear normal, atraumatic   Eyes:  Pupils are equal, round, reactive to light EOMI, no nystagmus    Conjunctivae non-injected and sclerae white  ENT:    The external nose is normal    Pinnae are normal  Neck:  Normal range of motion    There is no rigidity noted    Trachea is in the midline  CV:  Regular rate and rhythm     Normal S1/S2, no S3/S4    No murmur or rub. Radial pulses 2+ bilaterally.  Resp:  Lungs are clear and equal bilaterally  There is no tachypnea    No increased work of breathing    No rales, wheezing, or rhonchi  GI:  No distention.  MS:  Normal muscular tone    Symmetric motor strength  Skin:  No rash or acute skin lesions noted  Neuro: A&Ox3, GCS 15    CN II - XII intact    Speech clear, fluent, and normal    Strength 5/5 and symmetric in bilateral upper and lower extremities.    No pronator drift. No leg drift. SILT throughout.    No ankle clonus    No tremor.     No meningismus   Psych:  Normal affect. Appropriate interactions.      Emergency Department Course       Imaging:  No orders to display      Report per radiology    Laboratory:  Labs Ordered and Resulted from Time of ED Arrival to Time of ED Departure - No data to display     Procedures       Emergency Department Course & Assessments:             Interventions:  Medications   0.9% sodium chloride BOLUS (0 mLs Intravenous Stopped 2/27/23 2333)   ketorolac (TORADOL) injection 15 mg (15 mg Intravenous $Given 2/27/23 2147)   metoclopramide (REGLAN) injection 10 mg (10 mg Intravenous $Given 2/27/23 2147)   diphenhydrAMINE (BENADRYL)  injection 25 mg (25 mg Intravenous $Given 2/27/23 2148)        Independent Interpretation (X-rays, CTs, rhythm strip):  Consultations/Discussion of Management or Tests:  Assessments:  ED Course as of 02/28/23 0138   Mon Feb 27, 2023   2332 Patient feels improved. Ready for discharge.       Social Determinants of Health affecting care:   None    Disposition:  The patient was discharged to home.     Impression & Plan        Medical Decision Making:  Mireya Fritz is a 22 year old female who presents to the ED for evaluation of a bifrontal headache. She does have a prior history of similar headaches. On my evaluation she is well appearing and afebrile. She has a normal neurological examination without any focal neurologic deficits. There is not a history or evidence of trauma or injury. The patient has not had any fever, weakness, numbness, paresthesia, neck stiffness or confusion. Meningitis/encephalitis, intracranial hemorrhage, subarachnoid hemorrhage, CNS tumor, temporal arteritis, idiopathic intracranial hypertension are considered as part of the differential, and considered unlikely given the history and physical examination. At this time there was not an indication for emergent neuroimaging. Symptomatic treatment was administered as noted above and the patient felt improved. At this point I feel the patient's symptoms are due to tension headache. This and the findings of my evaluation was discussed with the patient. I recommended the patient should follow-up with her primary physician within 3 days if symptoms continue. If the headache continues or the frequency increases, consultation with neurology may be indicated.  Return precautions including increasing pain, fever, vomiting, neck stiffness, syncope, numbness or weakness, were discussed with the patient.  Medications for home prescribed as noted below. All questions were answered prior to the patient's discharge. She was in agreement with the plan of  care and she was discharged in stable condition.         Diagnosis:    ICD-10-CM    1. Frontal headache  R51.9            Discharge Medications:  Discharge Medication List as of 2/27/2023 11:34 PM           2/27/2023   George Frederick MD Daro, Ryan Clay, MD  02/28/23 0139

## 2023-03-02 ENCOUNTER — MYC MEDICAL ADVICE (OUTPATIENT)
Dept: PEDIATRICS | Facility: CLINIC | Age: 23
End: 2023-03-02
Payer: COMMERCIAL

## 2023-03-03 ENCOUNTER — VIRTUAL VISIT (OUTPATIENT)
Dept: PEDIATRICS | Facility: CLINIC | Age: 23
End: 2023-03-03
Payer: COMMERCIAL

## 2023-03-03 DIAGNOSIS — R06.83 SNORING: ICD-10-CM

## 2023-03-03 DIAGNOSIS — G44.209 TENSION HEADACHE: ICD-10-CM

## 2023-03-03 DIAGNOSIS — J02.9 SORE THROAT: ICD-10-CM

## 2023-03-03 DIAGNOSIS — L50.9 URTICARIA: ICD-10-CM

## 2023-03-03 DIAGNOSIS — G43.009 MIGRAINE WITHOUT AURA AND WITHOUT STATUS MIGRAINOSUS, NOT INTRACTABLE: Primary | ICD-10-CM

## 2023-03-03 PROCEDURE — 99214 OFFICE O/P EST MOD 30 MIN: CPT | Mod: VID | Performed by: NURSE PRACTITIONER

## 2023-03-03 NOTE — TELEPHONE ENCOUNTER
Called the pt to discuss.      She has an appt with Neurology, but not until the end of March.  She has had migraines before, but they are just getting worse.  If she has one, it takes her out for the day.      She has been taking zyrtec for the hives.  No problems breathing.  No new foods - she has been drinking pedialyte - that is the only new thing.      She said her throat is pretty sore today.  No fever.  No nausea.  No cough.  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      Advised she should have some kind of visit as she has a lot going on.      Video visit scheduled for today with Vida Golden.      Will forward to Dr. Rodriguez as FUENTES.

## 2023-03-03 NOTE — PATIENT INSTRUCTIONS
It was nice seeing you today.    [phone]    Please let me know if you have any questions regarding today's visit!    Take care,    EDENILSON Golden, ESTELITA  Family Medicine

## 2023-03-03 NOTE — TELEPHONE ENCOUNTER
Agree with visit today  - thank you for scheduling.  ? If steroid burst would be helpful to break headache cycle until able to see neurology.    Lydia Rodriguez MD

## 2023-03-03 NOTE — PROGRESS NOTES
Mireya is a 22 year old who is being evaluated via a billable video visit.      How would you like to obtain your AVS? MyChart  If the video visit is dropped, the invitation should be resent by: Text to cell phone: [phone]  Will anyone else be joining your video visit? No          Assessment & Plan     Migraine without aura and without status migrainosus, not intractable  Tension headache  Worsening especially over the last month.    Continue with topiramate daily and Excedrin as needed  Follow-up with neurology.  Will defer medication change to neurology.    Urticaria  Hx of hives due to heat.  Patient assuming this is the cause of the last 2 episodes  Encourage Zyrtec use daily for 2-4 weeks.    Sore throat  Will rule out strep.  Phone number provided to clinic to schedule  - Streptococcus A Rapid Screen w/Reflex to PCR - Clinic Collect; Future    Snoring  Possibly contributing to sore throat      Return in about 4 weeks (around 3/31/2023) for Follow up: after neurology.    Vida Golden NP  [facility] REBECCA    Ahsan Gomes is a 22 year old presenting for the following health issues:    No chief complaint on file.    HPI     Headache:  -Both tension headaches and migraines.  -Chronic and takes topiramate for prevention.  At max dose of 100mg.  -Has worsened.  Has been to the ER twice in the last 30 days.  Appt every other day to daily.  -Takes Excedrin as needed.  -Neuro appt end of the month.  -Did change jobs in January and unsure if that is causing stress and/or anxiety.  -Has taking sumatriptan and rizatriptan without relief.  -Has been seen at [place].  -No imaging done.    Hives:  -Has been getting hives x2 in the last week.    -Did take Zyrtec.  -Last episode Wednesday.  -Hx of hives when hot.  -Eyes swell.  No throat swelling.    Sore throat:  -Present 2 weeks ago and did get better  -Worse this week in the morning.  -No sick symptoms  -Does snore    Review of Systems    Constitutional, HEENT, cardiovascular, pulmonary, gi and gu systems are negative, except as otherwise noted.      Objective           Vitals:  No vitals were obtained today due to virtual visit.    Physical Exam   GENERAL: Healthy, alert and no distress  EYES: Eyes grossly normal to inspection.  No discharge or erythema, or obvious scleral/conjunctival abnormalities.  RESP: No audible wheeze, cough, or visible cyanosis.  No visible retractions or increased work of breathing.    SKIN: Visible skin clear. No significant rash, abnormal pigmentation or lesions.  NEURO: Cranial nerves grossly intact.  Mentation and speech appropriate for age.  PSYCH: Mentation appears normal, affect normal/bright, judgement and insight intact, normal speech and appearance well-groomed.        Video-Visit Details    Type of service:  Video Visit     Originating Location (pt. Location): Home    Distant Location (provider location):  Off-site  Platform used for Video Visit: [employer]

## 2023-03-04 ENCOUNTER — OFFICE VISIT (OUTPATIENT)
Dept: URGENT CARE | Facility: URGENT CARE | Age: 23
End: 2023-03-04
Payer: COMMERCIAL

## 2023-03-04 VITALS — HEART RATE: 94 BPM | OXYGEN SATURATION: 97 % | TEMPERATURE: 98.5 F | WEIGHT: 206.3 LBS | BODY MASS INDEX: 31.78 KG/M2

## 2023-03-04 DIAGNOSIS — B27.90 INFECTIOUS MONONUCLEOSIS WITHOUT COMPLICATION, INFECTIOUS MONONUCLEOSIS DUE TO UNSPECIFIED ORGANISM: Primary | ICD-10-CM

## 2023-03-04 DIAGNOSIS — J02.9 PHARYNGITIS, UNSPECIFIED ETIOLOGY: ICD-10-CM

## 2023-03-04 LAB
DEPRECATED S PYO AG THROAT QL EIA: NEGATIVE
GROUP A STREP BY PCR: NOT DETECTED
MONOCYTES NFR BLD AUTO: POSITIVE %
WBC # BLD AUTO: 10.5 10E3/UL (ref 4–11)

## 2023-03-04 PROCEDURE — 85007 BL SMEAR W/DIFF WBC COUNT: CPT | Performed by: PHYSICIAN ASSISTANT

## 2023-03-04 PROCEDURE — 85048 AUTOMATED LEUKOCYTE COUNT: CPT | Performed by: PHYSICIAN ASSISTANT

## 2023-03-04 PROCEDURE — 87651 STREP A DNA AMP PROBE: CPT | Performed by: PHYSICIAN ASSISTANT

## 2023-03-04 PROCEDURE — 99214 OFFICE O/P EST MOD 30 MIN: CPT | Performed by: PHYSICIAN ASSISTANT

## 2023-03-04 PROCEDURE — 86308 HETEROPHILE ANTIBODY SCREEN: CPT | Performed by: PHYSICIAN ASSISTANT

## 2023-03-04 PROCEDURE — 36415 COLL VENOUS BLD VENIPUNCTURE: CPT | Performed by: PHYSICIAN ASSISTANT

## 2023-03-04 NOTE — PROGRESS NOTES
SUBJECTIVE:  Mireya Fritz is a 22 year old female who comes in with a 1 week history of sore throat and swollen glands that seem to be getting worse.  She has not had any high fevers.  No significant URI related symptoms.  Denies GI symptoms or rashes.  Is able to eat and drink but is painful.  Works in a  but unsure of strep exposures.  No other associated symptoms and otherwise at baseline health    Past Medical History:   Diagnosis Date     Depression, unspecified depression type 7/12/2016     DIAZ (generalized anxiety disorder) 8/6/2016     Moderate major depression (H) 12/17/2019     Patient Active Problem List   Diagnosis     Right shoulder pain     Depression, unspecified depression type     DIAZ (generalized anxiety disorder)     Dysmenorrhea     Migraine without aura and without status migrainosus, not intractable     Cervical pain     Moderate major depression (H)     Moderate major depression (H)     Current Outpatient Medications   Medication     busPIRone HCl (BUSPAR) 30 MG tablet     cholecalciferol (VITAMIN D3) 125 mcg (5000 units) capsule     ferrous sulfate dried 160 (50 Fe) MG tablet     FLUoxetine (PROZAC) 20 MG capsule     norethindrone-ethinyl estradiol (ADAL FE 1/20) 1-20 MG-MCG tablet     topiramate (TOPAMAX) 100 MG tablet     meclizine (ANTIVERT) 25 MG tablet     prochlorperazine (COMPAZINE) 10 MG tablet     No current facility-administered medications for this visit.     Social History     Socioeconomic History     Marital status: Single     Spouse name: Not on file     Number of children: Not on file     Years of education: Not on file     Highest education level: Not on file   Occupational History     Not on file   Tobacco Use     Smoking status: Every Day     Types: Other, Vaping Device     Smokeless tobacco: Never     Tobacco comments:     uses e-cigs   Vaping Use     Vaping Use: Every day     Substances: Nicotine, CBD, Flavoring     Devices: Disposable, Pre-filled or  refillable cartridge   Substance and Sexual Activity     Alcohol use: Yes     Comment: once a every weekend     Drug use: No     Sexual activity: Not Currently     Partners: Male     Birth control/protection: Pill   Other Topics Concern     Parent/sibling w/ CABG, MI or angioplasty before 65F 55M? No   Social History Narrative     Not on file     Social Determinants of Health     Financial Resource Strain: Not on file   Food Insecurity: Not on file   Transportation Needs: Not on file   Physical Activity: Not on file   Stress: Not on file   Social Connections: Not on file   Intimate Partner Violence: Not on file   Housing Stability: Not on file     ROS  Negative other than stated above    Exam:  GENERAL APPEARANCE: healthy, alert and no distress  EYES: EOMI,  PERRL  HENT: TMs and canals clear bilaterally.  Oral mucosa moist with erythema noted.  There is no exudate.  Uvula is midline and no evidence for abscess.  Handling oral secretions well  NECK: Tender submandibular glands.  RESP: lungs clear to auscultation - no rales, rhonchi or wheezes  CV: regular rates and rhythm, normal S1 S2, no S3 or S4 and no murmur, click or rub -  ABDOMEN:  soft, nontender, no HSM or masses and bowel sounds normal  SKIN: no suspicious lesions or rashes    Results for orders placed or performed in visit on 03/04/23   Mononucleosis screen     Status: Abnormal   Result Value Ref Range    Mononucleosis Screen Positive (A) Negative   Streptococcus A Rapid Screen w/Reflex to PCR - Clinic Collect     Status: Normal    Specimen: Throat; Swab   Result Value Ref Range    Group A Strep antigen Negative Negative   WBC with Diff     Status: None (In process)    Narrative    The following orders were created for panel order WBC with Diff.  Procedure                               Abnormality         Status                     ---------                               -----------         ------                     WBC and Differential[[id number]]                              In process                   Please view results for these tests on the individual orders.     WBC  10.59 diff pending     assessment/plan:  (B27.90) Infectious mononucleosis without complication, infectious mononucleosis due to unspecified organism  (primary encounter diagnosis)  Comment:   Plan:   Patient with a 1 week history of sore throat and swollen glands.  Strep and white count are normal.  She did test positive for mono.  No signs of secondary infection.  Nature of mono was given.  Handout was reviewed.  Red flag signs were discussed.  Will use over-the-counter med for symptomatic relief.  Follow-up with primary symptoms worsen or new symptoms develop    (J02.9) Pharyngitis, unspecified etiology  Comment:   Plan: Streptococcus A Rapid Screen w/Reflex to PCR -         Clinic Collect, Group A Streptococcus PCR         Throat Swab, WBC with Diff, Mononucleosis         screen        As above

## 2023-03-06 LAB
BASOPHILS # BLD MANUAL: 0.3 10E3/UL (ref 0–0.2)
BASOPHILS NFR BLD MANUAL: 3 %
EOSINOPHIL # BLD MANUAL: 0 10E3/UL (ref 0–0.7)
EOSINOPHIL NFR BLD MANUAL: 0 %
LYMPHOCYTES # BLD MANUAL: 6 10E3/UL (ref 0.8–5.3)
LYMPHOCYTES NFR BLD MANUAL: 57 %
MONOCYTES # BLD MANUAL: 0.2 10E3/UL (ref 0–1.3)
MONOCYTES NFR BLD MANUAL: 2 %
NEUTROPHILS # BLD MANUAL: 4 10E3/UL (ref 1.6–8.3)
NEUTROPHILS NFR BLD MANUAL: 38 %
PATH REV: ABNORMAL
PLAT MORPH BLD: ABNORMAL
RBC MORPH BLD: ABNORMAL
VARIANT LYMPHS BLD QL SMEAR: PRESENT

## 2023-03-07 NOTE — RESULT ENCOUNTER NOTE
"  Sent Via [employer]     Dear Mireya,     Your \"Manual Differential\" blood count results are abnormal. These findings can be caused by Mono infections. They can also be caused by other medical conditions, so you should have your blood rechecked in the near future.      I advise the below:     -Make a follow-up visit to see your primary care provider to review your blood test results, and to re-check your Mono symptoms..     -Work with your primary care provider to arrange to have your blood rechecked sometime in the near future.     I hope you feel better soon!     Sincerely,      Oliver Shoemaker PA-C       "

## 2023-03-13 NOTE — TELEPHONE ENCOUNTER
"Requested Prescriptions   Pending Prescriptions Disp Refills     FLUoxetine (PROZAC) 20 MG capsule [Pharmacy Med Name: FLUOXETINE 20MG CAPSULES]  Last Written Prescription Date:  12/19/18  Last Fill Quantity: 270,  # refills: 1    Last office visit: 12/19/2018 with prescribing provider:  Lydia Rodriguez MD        Future Office Visit:     90 capsule 0     Sig: TAKE 3 CAPSULES BY MOUTH EVERY DAY       SSRIs Protocol Failed - 6/10/2019  1:10 PM        Failed - PHQ-9 score less than 5 in past 6 months     Please review last PHQ-9 score.   PHQ-9 SCORE 6/25/2018 8/6/2018 12/19/2018   PHQ-9 Total Score 17 18 17     DIAZ-7 SCORE 1/3/2018 8/6/2018 12/19/2018   Total Score 12 14 11                 Passed - Medication is active on med list        Passed - Patient is age 18 or older        Passed - No active pregnancy on record        Passed - No positive pregnancy test in last 12 months        Passed - Recent (6 mo) or future (30 days) visit within the authorizing provider's specialty     Patient had office visit in the last 6 months or has a visit in the next 30 days with authorizing provider or within the authorizing provider's specialty.  See \"Patient Info\" tab in inbasket, or \"Choose Columns\" in Meds & Orders section of the refill encounter.              " Parent(s)

## 2023-03-15 PROBLEM — M26.629 ARTHRALGIA OF TEMPOROMANDIBULAR JOINT: Status: ACTIVE | Noted: 2018-06-19

## 2023-03-15 PROBLEM — N92.1 METRORRHAGIA: Status: ACTIVE | Noted: 2023-03-15

## 2023-03-15 PROBLEM — N93.9 ABNORMAL UTERINE BLEEDING: Status: ACTIVE | Noted: 2023-03-15

## 2023-03-15 PROBLEM — G44.219 EPISODIC TENSION-TYPE HEADACHE: Status: ACTIVE | Noted: 2018-06-05

## 2023-03-15 PROBLEM — G43.009 MIGRAINE WITHOUT AURA: Status: ACTIVE | Noted: 2018-06-05

## 2023-03-15 PROBLEM — M26.639 ARTICULAR DISC DISORDER OF TEMPOROMANDIBULAR JOINT: Status: ACTIVE | Noted: 2018-06-05

## 2023-03-27 ENCOUNTER — VIRTUAL VISIT (OUTPATIENT)
Dept: NEUROLOGY | Facility: CLINIC | Age: 23
End: 2023-03-27
Payer: COMMERCIAL

## 2023-03-27 ENCOUNTER — TELEPHONE (OUTPATIENT)
Dept: NEUROLOGY | Facility: CLINIC | Age: 23
End: 2023-03-27

## 2023-03-27 DIAGNOSIS — G43.009 MIGRAINE WITHOUT AURA AND WITHOUT STATUS MIGRAINOSUS, NOT INTRACTABLE: Primary | ICD-10-CM

## 2023-03-27 PROCEDURE — 99204 OFFICE O/P NEW MOD 45 MIN: CPT | Mod: VID

## 2023-03-27 RX ORDER — ELETRIPTAN HYDROBROMIDE 40 MG/1
40 TABLET, FILM COATED ORAL
Qty: 18 TABLET | Refills: 11 | Status: SHIPPED | OUTPATIENT
Start: 2023-03-27 | End: 2023-09-20

## 2023-03-27 RX ORDER — CETIRIZINE HYDROCHLORIDE 10 MG/1
10 TABLET ORAL DAILY
COMMUNITY
End: 2023-06-14

## 2023-03-27 ASSESSMENT — MIGRAINE DISABILITY ASSESSMENT (MIDAS)
ON A SCALE FROM 0-10 ON AVERAGE HOW PAINFUL WERE HEADACHES: 8
HOW MANY DAYS WAS YOUR PRODUCTIVITY CUT IN HALF BECAUSE OF HEADACHES: 5
HOW MANY DAYS WAS HOUSEWORK PRODUCTIVITY CUT IN HALF DUE TO HEADACHES: 10
HOW MANY DAYS DID YOU NOT DO HOUSEWORK BECAUSE OF HEADACHES: 10
HOW MANY DAYS DID YOU MISS WORK OR SCHOOL BECAUSE OF HEADACHES: 4
HOW MANY DAYS IN THE PAST 3 MONTHS HAVE YOU HAD A HEADACHE: 26
HOW OFTEN WERE SOCIAL ACTIVITIES MISSED DUE TO HEADACHES: 10
TOTAL SCORE: 39

## 2023-03-27 ASSESSMENT — HEADACHE IMPACT TEST (HIT 6)
HOW OFTEN DO HEADACHES LIMIT YOUR DAILY ACTIVITIES: ALWAYS
HIT6 TOTAL SCORE: 74
WHEN YOU HAVE HEADACHES HOW OFTEN IS THE PAIN SEVERE: ALWAYS
HOW OFTEN HAVE YOU FELT TOO TIRED TO WORK BECAUSE OF YOUR HEADACHES: VERY OFTEN
HOW OFTEN DID HEADACHS LIMIT CONCENTRATION ON WORK OR DAILY ACTIVITY: ALWAYS
WHEN YOU HAVE A HEADACHE HOW OFTEN DO YOU WISH YOU COULD LIE DOWN: ALWAYS
HOW OFTEN HAVE YOU FELT FED UP OR IRRITATED BECAUSE OF YOUR HEADACHES: VERY OFTEN

## 2023-03-27 ASSESSMENT — PATIENT HEALTH QUESTIONNAIRE - PHQ9: SUM OF ALL RESPONSES TO PHQ QUESTIONS 1-9: 16

## 2023-03-27 NOTE — NURSING NOTE
Chief Complaint   Patient presents with     Video Visit     Headaches and Migraines      Is the patient currently in the state of [place]? YES    Visit mode:VIDEO    If the visit is dropped, the patient can be reconnected by: VIDEO VISIT: Text to cell phone: [phone]    Will anyone else be joining the visit? NO      How would you like to obtain your AVS? MyChart    Are changes needed to the allergy or medication list? NO    Reason for visit: Migraines/headaches

## 2023-03-27 NOTE — PROGRESS NOTES
Virtual Visit Details    Type of service:  Video Visit     Originating Location (pt. Location): Home    Distant Location (provider location):  On-site  Platform used for Video Visit: Jeremy

## 2023-03-27 NOTE — PROGRESS NOTES
[facility]   Headache Neurology Consult    March 27, 2023     Mireya Fritz MRN# [id number]   YOB: 2000 Age: 22 year old     Referring provider: Maddy Yo          Assessment and Recommendations:     Mireya Fritz is a 22 year old female who presents for further evaluation of headaches.    Her headache presentation meets criteria for frequent episodic migraine versus chronic migraine without aura.  It is possible she has this on a genetic basis.  Her headache presentation is complicated by history of depression and anxiety, intermittent lightheadedness.    Her virtual neurologic examination is intact today.  I did not recommend further evaluation of secondary cause of headache today, though we discussed that should her headache features change or be refractory to treatment that updated brain imaging, such as an MRI brain, would be recommended.    She has previously tried and failed sumatriptan, rizatriptan, prochlorperazine, venlafaxine, bupropion.  She is currently using topiramate, fluoxetine, buspirone but continues to experience frequent and severe headaches.    We discussed the following treatment strategy:  - For acute treatment of mild headache, she may use Excedrin as needed, not to exceed 9 days per month to avoid medication overuse.   - For acute treatment of moderate to severe headache, I recommend a trial of eletriptan 40 mg tablet taken at onset of headache with repeat dose taken after 2 hours if needed. Use should not exceed 9 days per month to avoid medication overuse.   - If eletriptan is not effective, not tolerated, or not covered by her insurance, would consider a gepant.  Nurtec appears to be on her formulary.    Her current frequency and severity of headaches warrant prevention.  - I would not recommend an antihypertensive agent given her intermittent lightheadedness and orthostatic dizziness that she currently endorses.  She is  currently using antidepressant medications so would not recommend a tricyclic antidepressant in addition.  - I recommend a trial of Emgality subcutaneous injection administered every 28 days.  We reviewed that the first month is a 2 mL loading dose and each month thereafter is a 1 mL injection.  Side effects reviewed.  We will work to obtain prior authorization for this.  I recommend a trial of at least 3 to 6 months prior to determining effectiveness.  - Alternatively, could consider other CGRP inhibitors or botulinum toxin injections following a chronic migraine protocol.  - If Emgality is effective, could consider weaning off of topiramate.  Would decrease her dose by 25 mg every 5 to 7 days until she is able to discontinue the medication.    I will plan to follow-up with her in 3 to 4 months to monitor her progress.    Renea Salas PA-C  Headache Neurology  [facility]            Chief Complaint:     Chief Complaint   Patient presents with     Video Visit     Headaches and Migraines            History is obtained from the patient and medical record. Patient was seen via virtual visit.       Mireya Fritz is a 22 year old female who presents for further evaluation of headaches.     She has had headaches for years, as long as she can remember.  They have progressively gotten worse recently.  Throughout the month of February, she had a headache almost every day and needed to go to urgent care or the ER twice for headache.  She did find migraine cocktails helpful.  So far this month, headaches seem to be improving.  She has gone a full week without migraine headaches.    She describes her headaches as a bilateral throbbing pain, located primarily at the temples.  She will have associated photophobia and phonophobia.  She is sensitive to smells at baseline.  She will be nauseous and can rarely vomit.  Her right eye can have blurred vision and she can see sparkles with severe  headaches.    If she is able to take Excedrin early enough, she is able to reduce her headache severity and duration.  Otherwise, she will need to lie in a dark room and sleep in order to abort headache.  Headaches can last until the next day.    She rates her typical headache pain at a 5-6/10 and her severe headache pain at a 9/10.    She currently reports 14 or more headache days a month, with 5/30 severe headache days per month.  She is nauseous and sensitive to light with all of her headaches.    Previously tried abortive therapies include sumatriptan 100 mg, rizatriptan 10 mg, and prochlorperazine 10 mg.  She has not found these effective.  Ibuprofen, acetaminophen, naproxen are not effective.  Excedrin is most effective.    She is currently using topiramate 100 mg at bedtime.  She has not noticed any improvement in her headaches and has been on this medication for years.  She has previously used bupropion, venlafaxine and is currently using fluoxetine and buspirone for mental health, she has not noticed any headache benefit from these.    She denies any focal paresthesias or weakness, unilateral autonomic features, pulsatile tinnitus or positional component, fevers or illness.    She does experience intermittent dizziness and lightheadedness.  She describes this as a lightheadedness, like she needs to eat or drink something or she will faint.  She denies any feelings of vertigo.  She can often get dizzy upon standing.  She notes this lightheadedness is worse with physical exertion such as exercise.  She does also experience palpitations with exercise.  She denies any associated chest pain or shortness of breath.    She is not aware of any triggers for her headaches.  She has been using a migraine chart to track her symptoms.    She denies any problems with sleep.  She will occasionally drink energy drinks or soda.  She denies any history of head trauma.  Her mother has a history of migraines.    She is  scheduled for an updated eye exam this upcoming Monday.    Current headache treatments:  Acute therapies:  - Excedrin     Preventative therapies:  - Topiramate 100 mg nightly - not effective, has been using for years  - Fluoxetine (depression, anxiety)  - Buspirone (depression, anxiety)    Supportive therapies:  - Massage  - Ice    Previous treatments tried:  Acute therapies:  - Acetaminophen - not effective  - Ibuprofen - not effective  - Naproxen - not effective  - Sumatriptan 100 mg - not effective  - Prochlorperazine 10 mg - not effective  - Rizatriptan 10 mg - not effective    Preventative therapies:  - Buproprion  - Venlaxafine     Supportive therapies:            Past Medical History:     Past Medical History:   Diagnosis Date     Depression, unspecified depression type 7/12/2016     DIAZ (generalized anxiety disorder) 8/6/2016     Moderate major depression (H) 12/17/2019     No history of hypertension or heart disease.  No history of asthma or kidney stones.              Past Surgical History:     Past Surgical History:   Procedure Laterality Date     NO HISTORY OF SURGERY               Social History:     She works as a mental health technician  Vapes  Occasional alcohol use  [place] [employer] and marijuana occasionally    Social History     Socioeconomic History     Marital status: Single     Spouse name: Not on file     Number of children: Not on file     Years of education: Not on file     Highest education level: Not on file   Occupational History     Not on file   Tobacco Use     Smoking status: Every Day     Types: Other, Vaping Device     Smokeless tobacco: Never     Tobacco comments:     uses e-cigs   Vaping Use     Vaping Use: Every day     Substances: Nicotine, CBD, Flavoring     Devices: Disposable, Pre-filled or refillable cartridge   Substance and Sexual Activity     Alcohol use: Yes     Comment: once a every weekend     Drug use: No     Sexual activity: Not Currently     Partners: Male     Birth  control/protection: Pill   Other Topics Concern     Parent/sibling w/ CABG, MI or angioplasty before 65F 55M? No   Social History Narrative     Not on file     Social Determinants of Health     Financial Resource Strain: Not on file   Food Insecurity: Not on file   Transportation Needs: Not on file   Physical Activity: Not on file   Stress: Not on file   Social Connections: Not on file   Intimate Partner Violence: Not on file   Housing Stability: Not on file             Family History:     Family History   Problem Relation Age of Onset     Rheumatologic Disease Mother         ankylosing spondylitis     Breast Cancer Other         maternal great grandmother     Cancer - colorectal No family hx of              Allergies:    No Known Allergies   Has been getting occasional hives recently, not sure why. Zyrtec helps.          Medications:     Current Outpatient Medications:      amoxicillin-clavulanate (AUGMENTIN) 875-125 MG tablet, , Disp: , Rfl:      azithromycin (ZITHROMAX) 500 MG tablet, 2 tablets, Disp: , Rfl:      busPIRone HCl (BUSPAR) 30 MG tablet, Take 1 tablet (30 mg) by mouth 2 times daily, Disp: 180 tablet, Rfl: 3     cholecalciferol (VITAMIN D3) 125 mcg (5000 units) capsule, Take by mouth daily, Disp: , Rfl:      clobetasol (TEMOVATE) 0.05 % external ointment, 0.5 gm (pea-sized amount), Disp: , Rfl:      ferrous sulfate dried 160 (50 Fe) MG tablet, Take 1 tablet by mouth daily (with breakfast), Disp: , Rfl:      FLUoxetine (PROZAC) 20 MG capsule, Take 4 capsules (80 mg) by mouth daily, Disp: 360 capsule, Rfl: 3     fluticasone (FLONASE) 50 MCG/ACT nasal spray, Spray 2 sprays in nostril, Disp: , Rfl:      naproxen sodium (ANAPROX) 550 MG tablet, Take 550 mg by mouth, Disp: , Rfl:      norethindrone-ethinyl estradiol (ADAL FE 1/20) 1-20 MG-MCG tablet, Take 1 tablet by mouth daily Continuous use, Disp: 84 tablet, Rfl: 4     prochlorperazine (COMPAZINE) 10 MG tablet, Take 10 mg by mouth, Disp: , Rfl:       SUMAtriptan (IMITREX) 100 MG tablet, , Disp: , Rfl:      topiramate (TOPAMAX) 100 MG tablet, Take 1 tablet (100 mg) by mouth daily, Disp: 90 tablet, Rfl: 3     triamcinolone (KENALOG) 0.1 % external ointment, , Disp: , Rfl:           Physical Exam:   There were no vitals taken for this visit.     General: In no acute distress.  Neck: Normal range of motion with lateral head movements and neck flexion.  Eyes: No conjunctival injection, no scleral icterus.     Neurologic Exam:  Mental Status Exam: Alert, awake and oriented to situation. No dysarthria. Speech of normal fluency.  Cranial Nerves: EOMs intact, facial movements symmetric, hearing intact to conversation, tongue midline and fully mobile. No tongue atrophy or fasciculations.    Motor: No drift in upper extremities. Able to stand from a seated position without use of arms. No tremors or abnormal movements noted.  Coordination: With arms outstretched, able to touch nose using index finger accurately bilaterally. Normal finger tapping bilaterally.    Gait: Normal stance and gait.            Data:

## 2023-03-27 NOTE — TELEPHONE ENCOUNTER
Prior Authorization Specialty Medication Request    Medication/Dose: galcanezumab-gnlm (EMGALITY) 240 MG/ML injection(loading/starting dose) & galcanezumab-gnlm (EMGALITY) 120 MG/ML injection(maintenance dose)  ICD code (if different than what is on RX):    Previously Tried and Failed:      Important Lab Values:   Rationale:     Insurance Name:   Insurance ID:   Insurance Phone Number:     Pharmacy Information (if different than what is on RX)  Name:    Phone:

## 2023-03-27 NOTE — LETTER
3/27/2023         RE: Mireya Fritz  [address] [place]  [facility] [address]        Dear Colleague,    Thank you for referring your patient, Mireya Fritz, to the [facility]. Please see a copy of my visit note below.    [facility]   Headache Neurology Consult    March 27, 2023     Mireya Fritz MRN# [id number]   YOB: 2000 Age: 22 year old     Referring provider: Maddy Yo          Assessment and Recommendations:     Mireya Fritz is a 22 year old female who presents for further evaluation of headaches.    Her headache presentation meets criteria for frequent episodic migraine versus chronic migraine without aura.  It is possible she has this on a genetic basis.  Her headache presentation is complicated by history of depression and anxiety, intermittent lightheadedness.    Her virtual neurologic examination is intact today.  I did not recommend further evaluation of secondary cause of headache today, though we discussed that should her headache features change or be refractory to treatment that updated brain imaging, such as an MRI brain, would be recommended.    She has previously tried and failed sumatriptan, rizatriptan, prochlorperazine, venlafaxine, bupropion.  She is currently using topiramate, fluoxetine, buspirone but continues to experience frequent and severe headaches.    We discussed the following treatment strategy:  - For acute treatment of mild headache, she may use Excedrin as needed, not to exceed 9 days per month to avoid medication overuse.   - For acute treatment of moderate to severe headache, I recommend a trial of eletriptan 40 mg tablet taken at onset of headache with repeat dose taken after 2 hours if needed. Use should not exceed 9 days per month to avoid medication overuse.   - If eletriptan is not effective, not tolerated, or not covered by her insurance, would consider a  parisa.  Nurtec appears to be on her formulary.    Her current frequency and severity of headaches warrant prevention.  - I would not recommend an antihypertensive agent given her intermittent lightheadedness and orthostatic dizziness that she currently endorses.  She is currently using antidepressant medications so would not recommend a tricyclic antidepressant in addition.  - I recommend a trial of Emgality subcutaneous injection administered every 28 days.  We reviewed that the first month is a 2 mL loading dose and each month thereafter is a 1 mL injection.  Side effects reviewed.  We will work to obtain prior authorization for this.  I recommend a trial of at least 3 to 6 months prior to determining effectiveness.  - Alternatively, could consider other CGRP inhibitors or botulinum toxin injections following a chronic migraine protocol.  - If Emgality is effective, could consider weaning off of topiramate.  Would decrease her dose by 25 mg every 5 to 7 days until she is able to discontinue the medication.    I will plan to follow-up with her in 3 to 4 months to monitor her progress.    Renea Salas PA-C  Headache Neurology  [facility]            Chief Complaint:     Chief Complaint   Patient presents with     Video Visit     Headaches and Migraines            History is obtained from the patient and medical record. Patient was seen via virtual visit.       Mireya Fritz is a 22 year old female who presents for further evaluation of headaches.     She has had headaches for years, as long as she can remember.  They have progressively gotten worse recently.  Throughout the month of February, she had a headache almost every day and needed to go to urgent care or the ER twice for headache.  She did find migraine cocktails helpful.  So far this month, headaches seem to be improving.  She has gone a full week without migraine headaches.    She describes her headaches as a bilateral  throbbing pain, located primarily at the temples.  She will have associated photophobia and phonophobia.  She is sensitive to smells at baseline.  She will be nauseous and can rarely vomit.  Her right eye can have blurred vision and she can see sparkles with severe headaches.    If she is able to take Excedrin early enough, she is able to reduce her headache severity and duration.  Otherwise, she will need to lie in a dark room and sleep in order to abort headache.  Headaches can last until the next day.    She rates her typical headache pain at a 5-6/10 and her severe headache pain at a 9/10.    She currently reports 14 or more headache days a month, with 5/30 severe headache days per month.  She is nauseous and sensitive to light with all of her headaches.    Previously tried abortive therapies include sumatriptan 100 mg, rizatriptan 10 mg, and prochlorperazine 10 mg.  She has not found these effective.  Ibuprofen, acetaminophen, naproxen are not effective.  Excedrin is most effective.    She is currently using topiramate 100 mg at bedtime.  She has not noticed any improvement in her headaches and has been on this medication for years.  She has previously used bupropion, venlafaxine and is currently using fluoxetine and buspirone for mental health, she has not noticed any headache benefit from these.    She denies any focal paresthesias or weakness, unilateral autonomic features, pulsatile tinnitus or positional component, fevers or illness.    She does experience intermittent dizziness and lightheadedness.  She describes this as a lightheadedness, like she needs to eat or drink something or she will faint.  She denies any feelings of vertigo.  She can often get dizzy upon standing.  She notes this lightheadedness is worse with physical exertion such as exercise.  She does also experience palpitations with exercise.  She denies any associated chest pain or shortness of breath.    She is not aware of any triggers  for her headaches.  She has been using a migraine chart to track her symptoms.    She denies any problems with sleep.  She will occasionally drink energy drinks or soda.  She denies any history of head trauma.  Her mother has a history of migraines.    She is scheduled for an updated eye exam this upcoming Monday.    Current headache treatments:  Acute therapies:  - Excedrin     Preventative therapies:  - Topiramate 100 mg nightly - not effective, has been using for years  - Fluoxetine (depression, anxiety)  - Buspirone (depression, anxiety)    Supportive therapies:  - Massage  - Ice    Previous treatments tried:  Acute therapies:  - Acetaminophen - not effective  - Ibuprofen - not effective  - Naproxen - not effective  - Sumatriptan 100 mg - not effective  - Prochlorperazine 10 mg - not effective  - Rizatriptan 10 mg - not effective    Preventative therapies:  - Buproprion  - Venlaxafine     Supportive therapies:            Past Medical History:     Past Medical History:   Diagnosis Date     Depression, unspecified depression type 7/12/2016     DIAZ (generalized anxiety disorder) 8/6/2016     Moderate major depression (H) 12/17/2019     No history of hypertension or heart disease.  No history of asthma or kidney stones.              Past Surgical History:     Past Surgical History:   Procedure Laterality Date     NO HISTORY OF SURGERY               Social History:     She works as a mental health technician  Vapes  Occasional alcohol use  Delta 8 and marijuana occasionally    Social History     Socioeconomic History     Marital status: Single     Spouse name: Not on file     Number of children: Not on file     Years of education: Not on file     Highest education level: Not on file   Occupational History     Not on file   Tobacco Use     Smoking status: Every Day     Types: Other, Vaping Device     Smokeless tobacco: Never     Tobacco comments:     uses e-cigs   Vaping Use     Vaping Use: Every day     Substances:  Nicotine, CBD, Flavoring     Devices: Disposable, Pre-filled or refillable cartridge   Substance and Sexual Activity     Alcohol use: Yes     Comment: once a every weekend     Drug use: No     Sexual activity: Not Currently     Partners: Male     Birth control/protection: Pill   Other Topics Concern     Parent/sibling w/ CABG, MI or angioplasty before 65F 55M? No   Social History Narrative     Not on file     Social Determinants of Health     Financial Resource Strain: Not on file   Food Insecurity: Not on file   Transportation Needs: Not on file   Physical Activity: Not on file   Stress: Not on file   Social Connections: Not on file   Intimate Partner Violence: Not on file   Housing Stability: Not on file             Family History:     Family History   Problem Relation Age of Onset     Rheumatologic Disease Mother         ankylosing spondylitis     Breast Cancer Other         maternal great grandmother     Cancer - colorectal No family hx of              Allergies:    No Known Allergies   Has been getting occasional hives recently, not sure why. Zyrtec helps.          Medications:     Current Outpatient Medications:      amoxicillin-clavulanate (AUGMENTIN) 875-125 MG tablet, , Disp: , Rfl:      azithromycin (ZITHROMAX) 500 MG tablet, 2 tablets, Disp: , Rfl:      busPIRone HCl (BUSPAR) 30 MG tablet, Take 1 tablet (30 mg) by mouth 2 times daily, Disp: 180 tablet, Rfl: 3     cholecalciferol (VITAMIN D3) 125 mcg (5000 units) capsule, Take by mouth daily, Disp: , Rfl:      clobetasol (TEMOVATE) 0.05 % external ointment, 0.5 gm (pea-sized amount), Disp: , Rfl:      ferrous sulfate dried 160 (50 Fe) MG tablet, Take 1 tablet by mouth daily (with breakfast), Disp: , Rfl:      FLUoxetine (PROZAC) 20 MG capsule, Take 4 capsules (80 mg) by mouth daily, Disp: 360 capsule, Rfl: 3     fluticasone (FLONASE) 50 MCG/ACT nasal spray, Spray 2 sprays in nostril, Disp: , Rfl:      naproxen sodium (ANAPROX) 550 MG tablet, Take 550 mg by  mouth, Disp: , Rfl:      norethindrone-ethinyl estradiol (ADAL FE 1/20) 1-20 MG-MCG tablet, Take 1 tablet by mouth daily Continuous use, Disp: 84 tablet, Rfl: 4     prochlorperazine (COMPAZINE) 10 MG tablet, Take 10 mg by mouth, Disp: , Rfl:      SUMAtriptan (IMITREX) 100 MG tablet, , Disp: , Rfl:      topiramate (TOPAMAX) 100 MG tablet, Take 1 tablet (100 mg) by mouth daily, Disp: 90 tablet, Rfl: 3     triamcinolone (KENALOG) 0.1 % external ointment, , Disp: , Rfl:           Physical Exam:   There were no vitals taken for this visit.     General: In no acute distress.  Neck: Normal range of motion with lateral head movements and neck flexion.  Eyes: No conjunctival injection, no scleral icterus.     Neurologic Exam:  Mental Status Exam: Alert, awake and oriented to situation. No dysarthria. Speech of normal fluency.  Cranial Nerves: EOMs intact, facial movements symmetric, hearing intact to conversation, tongue midline and fully mobile. No tongue atrophy or fasciculations.    Motor: No drift in upper extremities. Able to stand from a seated position without use of arms. No tremors or abnormal movements noted.  Coordination: With arms outstretched, able to touch nose using index finger accurately bilaterally. Normal finger tapping bilaterally.    Gait: Normal stance and gait.            Data:       Virtual Visit Details    Type of service:  Video Visit     Originating Location (pt. Location): Home    Distant Location (provider location):  On-site  Platform used for Video Visit: AmWell              Again, thank you for allowing me to participate in the care of your patient.        Sincerely,        MERCEDES GONZALES PA-C

## 2023-03-29 NOTE — TELEPHONE ENCOUNTER
Prior Authorization Approval    Authorization Effective Date: 2/27/2023  Authorization Expiration Date: 9/25/2023  Medication: galcanezumab-gnlm (EMGALITY) 120 MG/ML injection - Initiated  Approved Dose/Quantity: 1ml/30ds  Reference #: [id number]   Insurance Company: [employer] - Badge [phone] Fax [phone]   Which Pharmacy is filling the prescription (Not needed for infusion/clinic administered): [id number] - [place], [place] - [address] [facility] [address]  [facility]  Pharmacy Notified: Yes  Patient Notified: Yes

## 2023-04-09 ENCOUNTER — HEALTH MAINTENANCE LETTER (OUTPATIENT)
Age: 23
End: 2023-04-09

## 2023-04-18 ENCOUNTER — TRANSFERRED RECORDS (OUTPATIENT)
Dept: MULTI SPECIALTY CLINIC | Facility: CLINIC | Age: 23
End: 2023-04-18

## 2023-04-18 LAB — CHLAMYDIA - HIM PATIENT REPORTED: NORMAL

## 2023-06-13 ENCOUNTER — E-VISIT (OUTPATIENT)
Dept: PEDIATRICS | Facility: CLINIC | Age: 23
End: 2023-06-13
Payer: COMMERCIAL

## 2023-06-13 DIAGNOSIS — N94.6 DYSMENORRHEA: Primary | ICD-10-CM

## 2023-06-13 PROCEDURE — 99207 PR NON-BILLABLE SERV PER CHARTING: CPT | Performed by: INTERNAL MEDICINE
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
## 2023-06-14 ENCOUNTER — VIRTUAL VISIT (OUTPATIENT)
Dept: BEHAVIORAL HEALTH | Facility: CLINIC | Age: 23
End: 2023-06-14
Payer: COMMERCIAL

## 2023-06-14 ENCOUNTER — VIRTUAL VISIT (OUTPATIENT)
Dept: PSYCHIATRY | Facility: CLINIC | Age: 23
End: 2023-06-14
Attending: PEDIATRICS
Payer: COMMERCIAL

## 2023-06-14 DIAGNOSIS — F81.0 READING COMPREHENSION DISORDER: ICD-10-CM

## 2023-06-14 DIAGNOSIS — F33.1 MODERATE EPISODE OF RECURRENT MAJOR DEPRESSIVE DISORDER (H): Primary | ICD-10-CM

## 2023-06-14 DIAGNOSIS — F41.1 GENERALIZED ANXIETY DISORDER: ICD-10-CM

## 2023-06-14 DIAGNOSIS — F43.9 TRAUMA AND STRESSOR-RELATED DISORDER: ICD-10-CM

## 2023-06-14 PROBLEM — N93.9 ABNORMAL UTERINE BLEEDING: Status: RESOLVED | Noted: 2023-03-15 | Resolved: 2023-06-14

## 2023-06-14 PROCEDURE — 90791 PSYCH DIAGNOSTIC EVALUATION: CPT | Mod: VID | Performed by: COUNSELOR

## 2023-06-14 PROCEDURE — 99205 OFFICE O/P NEW HI 60 MIN: CPT | Mod: VID | Performed by: NURSE PRACTITIONER

## 2023-06-14 RX ORDER — CLOBETASOL PROPIONATE 0.5 MG/G
OINTMENT TOPICAL
COMMUNITY
Start: 2022-05-17 | End: 2023-07-25

## 2023-06-14 ASSESSMENT — COLUMBIA-SUICIDE SEVERITY RATING SCALE - C-SSRS
TOTAL  NUMBER OF INTERRUPTED ATTEMPTS LIFETIME: NO
ATTEMPT LIFETIME: NO
1. HAVE YOU WISHED YOU WERE DEAD OR WISHED YOU COULD GO TO SLEEP AND NOT WAKE UP?: NO
6. HAVE YOU EVER DONE ANYTHING, STARTED TO DO ANYTHING, OR PREPARED TO DO ANYTHING TO END YOUR LIFE?: NO
2. HAVE YOU ACTUALLY HAD ANY THOUGHTS OF KILLING YOURSELF?: NO
TOTAL  NUMBER OF ABORTED OR SELF INTERRUPTED ATTEMPTS LIFETIME: NO

## 2023-06-14 ASSESSMENT — ANXIETY QUESTIONNAIRES
GAD7 TOTAL SCORE: 7
8. IF YOU CHECKED OFF ANY PROBLEMS, HOW DIFFICULT HAVE THESE MADE IT FOR YOU TO DO YOUR WORK, TAKE CARE OF THINGS AT HOME, OR GET ALONG WITH OTHER PEOPLE?: VERY DIFFICULT
7. FEELING AFRAID AS IF SOMETHING AWFUL MIGHT HAPPEN: SEVERAL DAYS
7. FEELING AFRAID AS IF SOMETHING AWFUL MIGHT HAPPEN: SEVERAL DAYS
5. BEING SO RESTLESS THAT IT IS HARD TO SIT STILL: NOT AT ALL
GAD7 TOTAL SCORE: 7
4. TROUBLE RELAXING: NOT AT ALL
2. NOT BEING ABLE TO STOP OR CONTROL WORRYING: SEVERAL DAYS
6. BECOMING EASILY ANNOYED OR IRRITABLE: NEARLY EVERY DAY
GAD7 TOTAL SCORE: 7
1. FEELING NERVOUS, ANXIOUS, OR ON EDGE: SEVERAL DAYS
3. WORRYING TOO MUCH ABOUT DIFFERENT THINGS: SEVERAL DAYS
IF YOU CHECKED OFF ANY PROBLEMS ON THIS QUESTIONNAIRE, HOW DIFFICULT HAVE THESE PROBLEMS MADE IT FOR YOU TO DO YOUR WORK, TAKE CARE OF THINGS AT HOME, OR GET ALONG WITH OTHER PEOPLE: VERY DIFFICULT

## 2023-06-14 ASSESSMENT — PATIENT HEALTH QUESTIONNAIRE - PHQ9

## 2023-06-14 NOTE — PATIENT INSTRUCTIONS
PLAN     Continue buspirone 30 mg twice daily at this time.   Decrease Prozac (fluoxetine) to 60 mg and update me next week on response. Would like to switch you over to Cymbalta for anxiety and depression, as this may be a better fit in light of the migraines/headaches.   Referred for psych eval to rule out ADHD.  Please call [phone] to schedule if you do not hear from us in 48 hours.   Follow-up:  1 month or sooner if new or worsening symptoms  Avoid mood-altering substances not prescribed to you.   Please contact me via Not iT with any non-urgent concerns or call [phone].   Call or text [phone] for mental health crisis.   Call 471 or use ER for potentially life-threatening situations.     Patient Education   Collaborative Care Psychiatry Service  What to Expect  Here's what to expect from your Collaborative Care Psychiatry Service (CCPS).   About CCPS  CCPS means 2 people work together to help you get better. You'll meet with a behavioral health clinician and a psychiatric doctor. A behavioral health clinician helps people with mental health problems by talking with them. A psychiatric doctor helps people by giving them medicine.  How it works  At every visit, you'll see the behavioral health clinician (BHC) first. They'll talk with you about how you're doing and teach you how to feel better.   Then you'll see the psychiatric doctor. This doctor can help you deal with troubling thoughts and feelings by giving you medicine. They'll make sure you know the plan for your care.   CCPS usually takes 3 to 6 visits. If you need more visits, we may have you start seeing a different psychiatric doctor for ongoing care.  If you have any questions or concerns, we'll be glad to talk with you.  About visits  Be open  At your visits, please talk openly about your problems. It may feel hard, but it's the best way for us to help you.  Cancelling visits  If you can't come to your visit, please call us right away at  "[phone]. If you don't cancel at least 24 hours (1 full day) before your visit, that's \"late cancellation.\"  Being late to visits  Being very late is the same as not showing up. You will be a \"no show\" if:  Your appointment starts with a [facility], and you're more than 15 minutes late for a 30-minute (half hour) visit. This will also cancel your appointment with the psychiatric doctor.  Your appointment is with a psychiatric doctor only, and you're more than 15 minutes late for a 30-minute (half hour) visit.  Your appointment is with a psychiatric doctor only, and you're more than 30 minutes late for a 60-minute (full hour) visit.  If you cancel late or don't show up 2 times within 6 months, we may end your care.   Getting help between visits  If you need help between visits, you can call us Monday to Friday from 8 a.m. to 4:30 p.m. at [phone].  Emergency care  Call 911 or go to the nearest emergency department if your life or someone else's life is in danger.  Call 988 anytime to reach the national Suicide and Crisis hotline.  Medicine refills  To refill your medicine, call your pharmacy. You can also call [facility]'s [facility] at [phone], Monday to Friday, 8 a.m. to 4:30 p.m. It can take 1 to 3 business days to get a refill.   Forms, letters, and tests  You may have papers to fill out, like FMLA, short-term disability, and workability. We can help you with these forms at your visits, but you must have an appointment. You may need more than 1 visit for this, to be in an intensive therapy program, or both.  Before we can give you medicine for ADHD, we may refer you to get tested for it or confirm it another way.  We may not be able to give you an emotional support animal letter.  We don't do mental health checks ordered by the court.   We don't do mental health testing, but we can refer you to get tested.   Thank you for choosing us for your care.  For informational purposes only. Not " to replace the advice of your health care provider. Copyright   2022 [facility]. All rights reserved. [employer] 104258 - 12/22.

## 2023-06-14 NOTE — NURSING NOTE
Is the patient currently in the state of [place]? YES    Visit mode:VIDEO    If the visit is dropped, the patient can be reconnected by: VIDEO VISIT: Text to cell phone: [phone]    Will anyone else be joining the visit? NO      How would you like to obtain your AVS? MyChart    Are changes needed to the allergy or medication list? NO    Reason for visit: Video Visit      Care team has reviewed attendance agreement with patient. Patient advised that two failed appointments within 6 months may lead to termination of current episode of care.      Vika Swift VF

## 2023-06-14 NOTE — Clinical Note
Hi Dr. Rodriguez,   Thank you for referring Mireya to Collaborative Care Psychiatry Service (CCPS).  It was a pleasure meeting with her.  I will let you know when she is dismissed from CCPS back to your care.   Sierra Wray, APRN, CNP, PNP-PC, PMHNP-BC  Collaborative Care Psychiatry Service (CCPS)
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
## 2023-06-18 DIAGNOSIS — N94.6 DYSMENORRHEA: ICD-10-CM

## 2023-06-19 ENCOUNTER — TELEPHONE (OUTPATIENT)
Dept: PEDIATRICS | Facility: CLINIC | Age: 23
End: 2023-06-19
Payer: COMMERCIAL

## 2023-06-19 NOTE — TELEPHONE ENCOUNTER
Called patient to schedule physical, no answer. LVM requesting a call back directly to PAL extension.     Danica Ramires, [facility]  [phone]

## 2023-06-20 ENCOUNTER — TELEPHONE (OUTPATIENT)
Dept: PEDIATRICS | Facility: CLINIC | Age: 23
End: 2023-06-20
Payer: COMMERCIAL

## 2023-06-20 RX ORDER — NORETHINDRONE ACETATE AND ETHINYL ESTRADIOL 1MG-20(21)
KIT ORAL
Qty: 84 TABLET | Refills: 4 | OUTPATIENT
Start: 2023-06-20

## 2023-06-20 NOTE — TELEPHONE ENCOUNTER
Patient was told by pharmacy that she does not have any refills left and that they sent in a request but it was rejected.     Sent to [facility] apparently at some point.    Cant fill until tomorrow.     Called patient back and advised of information. She will pick it up tomorrow at the [place] location.     COLIN Morse on 6/20/2023 at 3:05 PM

## 2023-07-18 ENCOUNTER — VIRTUAL VISIT (OUTPATIENT)
Dept: PSYCHIATRY | Facility: CLINIC | Age: 23
End: 2023-07-18
Payer: COMMERCIAL

## 2023-07-18 DIAGNOSIS — F81.0 READING COMPREHENSION DISORDER: ICD-10-CM

## 2023-07-18 DIAGNOSIS — F43.9 TRAUMA AND STRESSOR-RELATED DISORDER: ICD-10-CM

## 2023-07-18 DIAGNOSIS — F33.1 MODERATE EPISODE OF RECURRENT MAJOR DEPRESSIVE DISORDER (H): Primary | ICD-10-CM

## 2023-07-18 PROCEDURE — 99214 OFFICE O/P EST MOD 30 MIN: CPT | Mod: 95 | Performed by: NURSE PRACTITIONER

## 2023-07-18 NOTE — NURSING NOTE
Is the patient currently in the state of [place]? YES    Visit mode:VIDEO    If the visit is dropped, the patient can be reconnected by: VIDEO VISIT: Text to cell phone: [phone]    Will anyone else be joining the visit? NO      How would you like to obtain your AVS? MyChart    Are changes needed to the allergy or medication list? NO    Reason for visit: RECHECK

## 2023-07-18 NOTE — PATIENT INSTRUCTIONS
"PLAN  Decreased Prozac (fluoxetine) to 40 mg daily.   Tentative plan is to switch to Cymbalta.   MTM consult placed to provide more support to pt during this transition.   Please reach out via SkyPhrase, phone call, or appointment if any new or worsening symptoms before your next appt.  Follow up with me in 4-6 weeks.  Avoid mood-altering substances not prescribed to you.   Please contact me via SkyPhrase with any non-urgent concerns or call [phone].   Call or text [phone] for mental health crisis.   Call 911 or use ER for potentially life-threatening situations.       Patient Education   Collaborative Care Psychiatry Service  What to Expect  Here's what to expect from your Collaborative Care Psychiatry Service (CCPS).   About CCPS  CCPS means 2 people work together to help you get better. You'll meet with a behavioral health clinician and a psychiatric doctor. A behavioral health clinician helps people with mental health problems by talking with them. A psychiatric doctor helps people by giving them medicine.  How it works  At every visit, you'll see the behavioral health clinician (BHC) first. They'll talk with you about how you're doing and teach you how to feel better.   Then you'll see the psychiatric doctor. This doctor can help you deal with troubling thoughts and feelings by giving you medicine. They'll make sure you know the plan for your care.   CCPS usually takes 3 to 6 visits. If you need more visits, we may have you start seeing a different psychiatric doctor for ongoing care.  If you have any questions or concerns, we'll be glad to talk with you.  About visits  Be open  At your visits, please talk openly about your problems. It may feel hard, but it's the best way for us to help you.  Cancelling visits  If you can't come to your visit, please call us right away at [phone]. If you don't cancel at least 24 hours (1 full day) before your visit, that's \"late cancellation.\"  Being late to visits  Being " "very late is the same as not showing up. You will be a \"no show\" if:  Your appointment starts with a [facility], and you're more than 15 minutes late for a 30-minute (half hour) visit. This will also cancel your appointment with the psychiatric doctor.  Your appointment is with a psychiatric doctor only, and you're more than 15 minutes late for a 30-minute (half hour) visit.  Your appointment is with a psychiatric doctor only, and you're more than 30 minutes late for a 60-minute (full hour) visit.  If you cancel late or don't show up 2 times within 6 months, we may end your care.   Getting help between visits  If you need help between visits, you can call us Monday to Friday from 8 a.m. to 4:30 p.m. at [phone].  Emergency care  Call 911 or go to the nearest emergency department if your life or someone else's life is in danger.  Call 988 anytime to reach the national Suicide and Crisis hotline.  Medicine refills  To refill your medicine, call your pharmacy. You can also call [facility]'s Behavioral Access at [phone], Monday to Friday, 8 a.m. to 4:30 p.m. It can take 1 to 3 business days to get a refill.   Forms, letters, and tests  You may have papers to fill out, like FMLA, short-term disability, and workability. We can help you with these forms at your visits, but you must have an appointment. You may need more than 1 visit for this, to be in an intensive therapy program, or both.  Before we can give you medicine for ADHD, we may refer you to get tested for it or confirm it another way.  We may not be able to give you an emotional support animal letter.  We don't do mental health checks ordered by the court.   We don't do mental health testing, but we can refer you to get tested.   Thank you for choosing us for your care.  For informational purposes only. Not to replace the advice of your health care provider. Copyright   2022 [facility]. All rights reserved. alive.cn [id number] - " 12/22.

## 2023-07-18 NOTE — PROGRESS NOTES
Virtual Visit Details  Type of service:  Video Visit   Originating Location (pt. Location): Home    Distant Location (provider location):  On-site  Platform used for Video Visit: Jeremy    Video Start Time: 11:03 AM  Video End Time: 11:17 AM            [facility]  [address]  [place]  [address]   [phone]     [facility] CARE PSYCHIATRY SERVICE  PROGRESS NOTE    CHIEF COMPLAINT  Follow up on decrease in Prozac (fluoxetine).     HISTORY OF PRESENT ILLNESS  Mireya is a 22-year-old female who presents independently for follow-up since initial visit on 6/14/2023.  At that time, plan was:      Continue buspirone 30 mg twice daily at this time.     Decrease Prozac (fluoxetine) to 60 mg and update me next week on response. Would like to switch you over to Cymbalta for anxiety and depression, as this may be a better fit in light of the migraines/headaches.     Referred for psych eval to rule out ADHD.  Please call [phone] to schedule if you do not hear from us in 48 hours.     In the interim, recent stressor is that her grandfather was recently diagnosed with stage IV cancer. Getting easier to cope since finding out.  However, she just found out around the same time she decreased Prozac (fluoxetine) from 80 mg to 60 mg, so it has been difficult to tell the impact. Feels like she has done pretty good. For a couple weeks was pretty rough. She is interested in moving forward with plan to switch to Cymbalta. Open to meeting with [facility] psych pharmacist.     FAMILY HISTORY, PSYCHIATRIC HISTORY, SOCIAL HISTORY   Pertinent changes since previous visit:    Paternal grandfather just diagnosed with stage 4 cancer. Doesn't know the name of specific type. Going to [place] and will be seeking treatment.     PAST MEDICAL AND SURGICAL HISTORY  Pertinent changes since previous visit:  denied    ALLERGIES  No Known Allergies    CURRENT PSYCHOTROPIC  "MEDICATIONS  Prozac (fluoxetine) 60 mg daily    Reviewed med list with pt who reported all other meds are up to date.      PDMP Review     None         PAST PSYCHOTROPIC MEDICATION  Wellbutrin XL up to 300 mg QAM - Mom takes Wellbutrin. Doesn't recall s/e. Ineffective.   Prozac (fluoxetine) up to 80 mg - Started Prozac (fluoxetine) for the first time for DIAZ and unspecified depression in July 2016 (age 15).   Effexor  mg - ineffective, no s/e she recalls.      MENTAL STATUS EXAM  Vital signs:  Deferred due to virtual visit.  Appearance:  Alert, dressed appropriately for weather. Good hygiene.  Behavior:  Appropriate   Attitude:  Cooperative  Mood:  \"Pretty good\"   Affect:  Appropriate, mood congruent  Speech:  Normal  Thought process:  Logical  Thought content:  Normal. No suicidal or homicidal ideation.  Orientation:  x4  Memory:  Long-term:  Intact.  Short-term:  Intact.  Attention and concentration:  Good  Fund of knowledge:  Estimated within average range for age  Perception:  Intact. Does not appear to be responding to internal stimuli.   Impulse control:  Good  Insight:  Good  Judgement:  Good    DIAGNOSTIC IMPRESSION  Moderate episode of recurrent major depressive disorder, F33.1  Trauma and stressor-related disorder, F43.9  Reading comprehension disorder, by history, F81.0    Differential diagnoses:  R/o ADHD  R/o trichotillomania    FORMULATION  Stable symptoms despite stressor of grandfather's stage IV cancer diagnosis and reduction in Prozac (fluoxetine) from 80 mg to 60 mg. Will proceed with switch to Cymbalta as previously planned.      I'd like to offer her increased support during the transition from Prozac to Cymbalta, as this is only my 2nd time meeting with her, and she had been on a high dose of Prozac (fluoxetine) (80 mg).  Reviewed risk for decompensation if Prozac was providing more benefit than thought. She accepted referral for MDM consultation with psych pharmacist to further help " support this transition.  At this time, decrease Prozac to 40 mg daily.  Did not make any other changes today, as she will be meeting with pharmacist.  Asked her to reach out sooner if needed, and I'm happy to start the Cymbalta sooner but discussed risk for serotonin syndrome and signs symptoms to watch out for if Cymbalta added while she is tapering the Prozac (fluoxetine).      Goals/target symptoms:  Improved motivation, anhedonia, irritability, and overall functioning. Future considerations:  Integrative medicine/pain clinic may be beneficial for any ongoing somatic symptoms. For a more comprehensive formulation, refer to initial CCPS assessment dated 6.14.23.      PLAN  Decreased Prozac (fluoxetine) to 40 mg daily.   Tentative plan is to switch to Cymbalta.   MTM consult placed to provide more support to pt during this transition.   Please reach out via L8 SmartLightt, phone call, or appointment if any new or worsening symptoms before your next appt.  Follow up with me in 4-6 weeks.  Avoid mood-altering substances not prescribed to you.   Please contact me via about.me with any non-urgent concerns or call [phone].   Call or text [phone] for mental health crisis.   Call 911 or use ER for potentially life-threatening situations.      Patient status:  At this time, patient will continue to be seen for ongoing consultation and stabilization.  Informed consent and counseling:  Reviewed Informed Consent and Counseling: recommended treatment risks, benefits, alternatives, common side effects, treatment compliance, coordination of care with other clinicians, risk factor reduction, prognosis, safety plan and medication education     BILLING NOTE:  Level of Medical Decision Making:   - At least 1 chronic problem that is not stable  - Engaged in prescription drug management during visit (discussed any medication benefits, side effects, alternatives, etc.)          Sierra Wray, APRN, CNP, PNP-PC, PMHNP-BC   Collaborative Care  Psychiatry Service (CCPS)    Speech recognition software may be used to create portions of this document.  Attempts at proofreading have been made to minimize errors, though some may remain.

## 2023-07-18 NOTE — PROGRESS NOTES
"Virtual Visit Details    Type of service:  Video Visit     Originating Location (pt. Location): {video visit patient location:094327::\"Home\"}  {PROVIDER LOCATION On-site should be selected for visits conducted from your clinic location or adjoining [facility] hospital, academic office, or other nearby [facility] building. Off-site should be selected for all other provider locations, including home:663179}  Distant Location (provider location):  {virtual location provider:329208}  Platform used for Video Visit: {Virtual Visit Platforms:379085::\"Tittat\"}  "

## 2023-07-25 ENCOUNTER — VIRTUAL VISIT (OUTPATIENT)
Dept: PHARMACY | Facility: CLINIC | Age: 23
End: 2023-07-25
Attending: NURSE PRACTITIONER
Payer: COMMERCIAL

## 2023-07-25 DIAGNOSIS — F41.1 GAD (GENERALIZED ANXIETY DISORDER): ICD-10-CM

## 2023-07-25 DIAGNOSIS — Z78.9 TAKES DIETARY SUPPLEMENTS: ICD-10-CM

## 2023-07-25 DIAGNOSIS — Z78.9 USES BIRTH CONTROL: ICD-10-CM

## 2023-07-25 DIAGNOSIS — F33.2 SEVERE EPISODE OF RECURRENT MAJOR DEPRESSIVE DISORDER, WITHOUT PSYCHOTIC FEATURES (H): ICD-10-CM

## 2023-07-25 DIAGNOSIS — F32.1 MODERATE MAJOR DEPRESSION (H): Primary | ICD-10-CM

## 2023-07-25 DIAGNOSIS — G43.009 MIGRAINE WITHOUT AURA AND WITHOUT STATUS MIGRAINOSUS, NOT INTRACTABLE: ICD-10-CM

## 2023-07-25 PROCEDURE — 99207 PR NO CHARGE LOS: CPT | Performed by: PHARMACIST

## 2023-07-25 RX ORDER — FLUOXETINE 40 MG/1
40 CAPSULE ORAL DAILY
Qty: 30 CAPSULE | Refills: 0 | COMMUNITY
Start: 2023-07-25 | End: 2023-08-21

## 2023-07-25 RX ORDER — DULOXETIN HYDROCHLORIDE 30 MG/1
30 CAPSULE, DELAYED RELEASE ORAL DAILY
Qty: 30 CAPSULE | Refills: 1 | Status: SHIPPED | OUTPATIENT
Start: 2023-07-25 | End: 2023-08-21

## 2023-07-25 NOTE — PROGRESS NOTES
Medication Therapy Management (MTM) Encounter    ASSESSMENT:                            Medication Adherence/Access: No issues identified; ideal to use meds that can be dosed in evening, as she often forgets morning doses    Depression/Anxiety:   Patient agreeable to switch fluoxetine to duloxetine, though hesitant due to worry that it won't be helpful. As she has been on current fluoxetine dose for only one week, will continue for another week before making a change. She has been tolerating the reduction without issue. See plan.  May consider adding bupropion in the future, as she may not have had an adequate trial in the first place and could augment duloxetine if needed.  Will also continue assessment as to whether buspirone is helping, especially as she often misses morning dose and only takes 30mg once daily.    Contraception:  Stable. Continue current medication.    Supplements:   Stable. Continue current medication.    Migraine: Much improved since starting Emgality. Continue current medications.     PLAN:                            -No change to buspirone.  Continue taking buspirone 30mg twice daily  -Continue taking fluoxetine 40mg every day through Sunday, 7/30  -No fluoxetine on 7/31-8/3  -On Friday, 8/4, start taking duloxetine 30mg daily    Follow-up: 8/14/23 at 8:30am    SUBJECTIVE/OBJECTIVE:                          Mireya Fritz is a 22 year old female called for an initial visit. She was referred to me from APRIL Maharaj CNP.      Reason for visit: med review; possible med switch.    Allergies/ADRs: Reviewed in chart  Past Medical History: Reviewed in chart  Tobacco: She reports that she has been smoking other and vaping device. She has never used smokeless tobacco.Nicotine/Tobacco Cessation Plan:   Information offered: Patient not interested at this time  Alcohol: not currently using    Medication Adherence/Access: no issues reported  Sometimes forgets morning meds; often just skips morning  "doses on weekends due to sleeping in later    Depression/Anxiety:   -fluoxetine 40mg nightly (reduced from 60mg on 7/18)  -buspirone 30mg BID    Pt last saw psychiatry on 7/18, at which time fluoxetine was reduced from 60mg to 40mg daily (previously reduced from 80mg).  She has been tolerating fluoxetine dose reductions without issue and hasn't noticed that anything is worse. Per notes, she has been on fluoxetine and buspirone for about 7 years, though did stop it a couple times and try other meds for a time. She had reported that she is \"really hot all the time\" and they thought possibly related to fluoxetine, so was switched. That issue did not improve off the of the medication.   Today, she reported that she's not sure if medication are helping, as she's felt her mood plateau over the last few years.  She described that some days are worse than others, but generally has overall lack of energy and motivation to do much of anything. She may have some nights when she feels really sad. Overall feels that her mood stays very neutral, never \"happy\" and \"can swing into sad very easily.\"  Anxiety was previously problematic, but has improved somewhat. Anxiety often triggers depression.   Both sleep initiation and maintenance are variable on a day to day basis.  Usually in bed around 9:30-10pm and wakes around 7:30am. Does not feel rested.   Denied any SI or plan.    She had tried bupropion that was not helpful (few months; unknown max dose)  Tried a couple other medications that were not helpful, though couldn't remember names. Took for a few months at the longest.    Contraception:  -norethindrone-ethinyl estradiol FE 1/20--takes continuously.  No issues.    Supplements:   -Vitamin D 5000 units daily  -ferrous sulfate 160mg daily  No reported issues at this time.     Migraine:   Preventive medications  Emgality 120 mg monthly  Acute medications  Excedrin Migraine; had eletriptan, but ran out. Has not needed it since " starting Emgality, as Excedrin provides adequate relief.  Migraines have significantly reduced since starting Emgality about 3 months ago.  She was previously getting multiple migraines per week and now 1-2 per month. She may still get tension headaches a couple times per month, which is usually manageable without medication.  Triggers include: unable to identify any triggers, though do seem to happen in the afternoon.    Today's Vitals: There were no vitals taken for this visit.  ----------------    I spent 30 minutes with this patient today. All changes were made via collaborative practice agreement with Sierra Wray A copy of the visit note was provided to the patient's provider(s).    A summary of these recommendations was sent via Sellaround.    Ina Freedman, PharmD  Medication Therapy Management Pharmacist  [facility] Psychiatry and Neurology Clinics    Telemedicine Visit Details  Type of service:  Telephone visit  Start Time:  9:00am  End Time:  9:30am     Medication Therapy Recommendations  Moderate major depression (H)    Current Medication: FLUoxetine (PROZAC) 20 MG capsule (Discontinued)   Rationale: More effective medication available - Ineffective medication - Effectiveness   Recommendation: Change Medication - switch to duloxetine per plan   Status: Accepted per CPA

## 2023-07-25 NOTE — Clinical Note
Hello! Thanks for the referral!  Made a plan for switching to duloxetine. See plan. I will follow up with her on Monday, 8/14 to make sure things are going swimmingly and she'll see you that Friday. Ina

## 2023-07-25 NOTE — PATIENT INSTRUCTIONS
"Recommendations from today's MTM visit:                                                    MTM (medication therapy management) is a service provided by a clinical pharmacist designed to help you get the most of out of your medicines.   Today we reviewed what your medicines are for, how to know if they are working, that your medicines are safe and how to make your medicine regimen as easy as possible.      -No change to buspirone.  Continue taking 30mg twice daily  -Continue taking fluoxetine 40mg every day through Sunday, 7/30  -No fluoxetine on 7/31-8/3  -On Friday, 8/4, start taking duloxetine 30mg daily    Follow-up: 8/14/23 at 8:30am    It was great speaking with you today.  I value your experience and would be very thankful for your time in providing feedback in our clinic survey. In the next few days, you may receive an email or text message from [employer] with a link to a survey related to your  clinical pharmacist.\"     To schedule another MTM appointment, please call the clinic directly or you may call the MTM scheduling line at [phone] or toll-free at [phone].     My Clinical Pharmacist's contact information:                                                      Please feel free to contact me with any questions or concerns you have.      Ina Freedman, Josette  Medication Therapy Management Pharmacist  [facility] Psychiatry and Neurology Clinics  "

## 2023-08-18 NOTE — PROGRESS NOTES
Medication Therapy Management (MTM) Encounter    ASSESSMENT:                            Medication Adherence/Access: No issues identified    Depression/Anxiety: The switch to duloxetine has been fairly well tolerated, with the exception of its impact on her sleep and anxiety, which could be because she is taking it at night. Patient would benefit from switching from taking duloxetine at night to taking it in the morning. Additionally, with the patient not noticing much benefit in terms of anxiety or depressive symptoms, it is reasonable to increase the duloxetine dose. She is currently at a low dose and may need a higher dose to receive the benefit of symptom improvement. As duloxetine can be more stimulating, continue to monitor the unwanted effects on sleep and anxiety with both of these changes. May consider adding bupropion in the future, as she may not have had an adequate trial in the first place and could augment duloxetine if needed. Will also continue to assess how helpful buspirone is for anxiety symptoms.     PLAN:                            -Skip tonight's duloxetine 30mg dose  -8/22 morning, take duloxetine 60mg every day and every morning thereafter    You can use up the remaining 30 mg capsules you have at home, and I will send a new prescription for the 60 mg capsules for you to fill when needed.     Follow-up: Follow up with me on 9/20/23 at 8:30 using a phone visit     SUBJECTIVE/OBJECTIVE:                          Mireya Fritz is a 22 year old female called for a follow-up visit from 7/25/23.       Reason for visit: medication follow up    Allergies/ADRs: Reviewed in chart  Past Medical History: Reviewed in chart  Tobacco: She reports that she has been smoking other and vaping device. She has never used smokeless tobacco.  Alcohol: not assessed at this visit    Medication Adherence/Access: no issues reported    Depression/Anxiety:   -duloxetine 30 mg every evening  -buspirone 30 mg  "BID    Patient was previously on fluoxetine. She was successfully tapered off of it and started duloxetine on 8/4/23. Patient reports that feelings of anxiety have been more frequent in the last few weeks. She describes that the anxiety is present a lot of the time, with some occasional intense moments that feel less manageable. She described it as \"not being able to control the worrying.\"    She has also noticed that her sleep has changed since switching to duloxetine. She usually falls asleep ok, but sometimes waking up during the night and feeling \"wide awake.\" Patient does take her duloxetine dose in the evening around 9:00pm.    Patient reports that she felt mood was a bit worse when tapering fluoxetine, but is mostly unchanged since switching to duloxetine. Still endorses \"fairly neutral\" mood and can \"swing into sad\" pretty easily.    At the last visit, she described feeling \"really hot all the time.\" This contributed to her psychiatry provider wanting her to switch from fluoxetine to see if that was the cause. Today, patient reports that the feeling of being hot is still present. She has noticed an increase in how much she's been sweating, but believes this is fairly tolerable.      Past medications: She had tried bupropion that was not helpful (few months; unknown max dose)  Tried a couple other medications that were not helpful, though couldn't remember names. Took for a few months at the longest.    Today's Vitals: There were no vitals taken for this visit.  ----------------    I spent 18 minutes with this patient today. All changes were made via collaborative practice agreement with Sierra Wray. A copy of the visit note was provided to the patient's provider(s).    A summary of these recommendations was sent via clinic portal.    Diane Walker, 4th Year Student Pharmacist     Ina Freedman, PharmD  Medication Therapy Management Pharmacist  [facility] Psychiatry and Neurology " Clinics    Telemedicine Visit Details  Type of service:  Telephone visit  Start Time:  8:34  End Time:  8:52     Medication Therapy Recommendations  Depression, unspecified depression type    Current Medication: DULoxetine (CYMBALTA) 30 MG capsule (Discontinued)   Rationale: Dose too low - Dosage too low - Effectiveness   Recommendation: Increase Dose - increase from 30mg to 60mg and switch to morning   Status: Accepted per CPA

## 2023-08-21 ENCOUNTER — VIRTUAL VISIT (OUTPATIENT)
Dept: PHARMACY | Facility: CLINIC | Age: 23
End: 2023-08-21
Payer: COMMERCIAL

## 2023-08-21 DIAGNOSIS — F41.1 GAD (GENERALIZED ANXIETY DISORDER): Primary | ICD-10-CM

## 2023-08-21 DIAGNOSIS — F32.1 MODERATE MAJOR DEPRESSION (H): ICD-10-CM

## 2023-08-21 PROCEDURE — 99207 PR NO CHARGE LOS: CPT | Performed by: PHARMACIST

## 2023-08-21 RX ORDER — DULOXETIN HYDROCHLORIDE 60 MG/1
60 CAPSULE, DELAYED RELEASE ORAL EVERY MORNING
Qty: 30 CAPSULE | Refills: 1 | Status: SHIPPED | OUTPATIENT
Start: 2023-08-21 | End: 2023-09-20 | Stop reason: SINTOL

## 2023-08-21 NOTE — PATIENT INSTRUCTIONS
"Recommendations from today's MTM visit:                                                    MTM (medication therapy management) is a service provided by a clinical pharmacist designed to help you get the most of out of your medicines.   Today we reviewed what your medicines are for, how to know if they are working, that your medicines are safe and how to make your medicine regimen as easy as possible.      -Skip tonight's duloxetine 30mg dose  -8/22 morning, take duloxetine 60mg every day and every morning thereafter    You can use up the remaining 30 mg capsules you have at home, and I will send a new prescription for the 60 mg capsules for you to fill when needed.     Follow-up: Follow up with me on 9/20/23 at 8:30 using a phone visit     It was great speaking with you today.  I value your experience and would be very thankful for your time in providing feedback in our clinic survey. In the next few days, you may receive an email or text message from [employer] with a link to a survey related to your  clinical pharmacist.\"     To schedule another MTM appointment, please call the clinic directly or you may call the MTM scheduling line at [phone] or toll-free at [phone].     My Clinical Pharmacist's contact information:                                                      Please feel free to contact me with any questions or concerns you have.      Ina Freedman, PharmD  Medication Therapy Management Pharmacist  [facility] Psychiatry and Neurology Clinics  "

## 2023-08-21 NOTE — Clinical Note
Switched duloxetine from PM to AM and increased dose from 30mg to 60mg.  She mentioned you are leaving clinic and thought she'd just keep following up with me and PCP instead of scheduling with a different psych provider. Good luck with wherever you're going! Ina

## 2023-09-11 ENCOUNTER — MYC MEDICAL ADVICE (OUTPATIENT)
Dept: PEDIATRICS | Facility: CLINIC | Age: 23
End: 2023-09-11
Payer: COMMERCIAL

## 2023-09-11 ASSESSMENT — PATIENT HEALTH QUESTIONNAIRE - PHQ9
10. IF YOU CHECKED OFF ANY PROBLEMS, HOW DIFFICULT HAVE THESE PROBLEMS MADE IT FOR YOU TO DO YOUR WORK, TAKE CARE OF THINGS AT HOME, OR GET ALONG WITH OTHER PEOPLE: VERY DIFFICULT
SUM OF ALL RESPONSES TO PHQ QUESTIONS 1-9: 12
SUM OF ALL RESPONSES TO PHQ QUESTIONS 1-9: 12

## 2023-09-20 ENCOUNTER — VIRTUAL VISIT (OUTPATIENT)
Dept: PHARMACY | Facility: CLINIC | Age: 23
End: 2023-09-20
Payer: COMMERCIAL

## 2023-09-20 DIAGNOSIS — F32.1 MODERATE MAJOR DEPRESSION (H): Primary | ICD-10-CM

## 2023-09-20 DIAGNOSIS — F41.1 GAD (GENERALIZED ANXIETY DISORDER): ICD-10-CM

## 2023-09-20 PROCEDURE — 99207 PR NO CHARGE LOS: CPT | Performed by: PHARMACIST

## 2023-09-20 NOTE — Clinical Note
MTM today--increased sweating and weird dreams on higher duloxetine, so we switched to sertraline today. I'll see her back in about a month. -Ina

## 2023-09-20 NOTE — PATIENT INSTRUCTIONS
"Recommendations from today's MTM visit:                                                    MTM (medication therapy management) is a service provided by a clinical pharmacist designed to help you get the most of out of your medicines.   Today we reviewed what your medicines are for, how to know if they are working, that your medicines are safe and how to make your medicine regimen as easy as possible.      -start taking sertraline 50mg every morning. If well tolerated after one week, ok to increase to 100mg every morning. I sent this to Nishant on [facility].    Follow-up: I will call you on 10/23/23 at 8:30am    It was great speaking with you today.  I value your experience and would be very thankful for your time in providing feedback in our clinic survey. In the next few days, you may receive an email or text message from [employer] with a link to a survey related to your  clinical pharmacist.\"     To schedule another MTM appointment, please call the clinic directly or you may call the MTM scheduling line at [phone] or toll-free at [phone].     My Clinical Pharmacist's contact information:                                                      Please feel free to contact me with any questions or concerns you have.      Ina Freedman, PharmD  Medication Therapy Management Pharmacist  [facility]    "

## 2023-09-20 NOTE — PROGRESS NOTES
Medication Therapy Management (MTM) Encounter    ASSESSMENT:                            Medication Adherence/Access: No issues identified    Depression/Anxiety:   She has already been off of duloxetine for about one week. Open to starting a new medication. Discussed sertraline as an option, which was not familiar to her as a past trial. Reviewed potential for increased serotonin on top of buspirone, but might not be much different than with duloxetine.    PLAN:                            -start taking sertraline 50mg every morning. If well tolerated after one week, ok to increase to 100mg every morning    Follow-up: I will call you on 10/23/23 at 8:30am    SUBJECTIVE/OBJECTIVE:                          Mireya Fritz is a 22 year old female called for a follow-up visit from 8/21/23.       Reason for visit: med check.    Allergies/ADRs: Reviewed in chart  Past Medical History: Reviewed in chart  Tobacco: She reports that she has been smoking other and vaping device. She has never used smokeless tobacco.Nicotine/Tobacco Cessation Plan:   Information offered: Patient not interested at this time    Alcohol: not currently using    Medication Adherence/Access: no issues reported    Depression/Anxiety:   -duloxetine 30 mg every evening  -buspirone 30 mg BID    At last visit, we moved duloxetine from evening to morning due to possible sleep disruption and increased the dose from 30mg to 60mg.  Today, she reported that she has noticed significantly increased sweating over night and has been having weird dreams since the dose increase.     She reported having not noticed any positive change with the duloxetine, plus with the side effects, she elected to stop taking it about a week ago and has been feeling much more depressed. Sleep has been better.    Reviewed notes for info on previous meds:  -bupropion ER 300mg- couple months; not helpful, but well tolerated    -venlafaxine (maybe for migraines?)  -couple others she  doesn't remember; thinks she took escitalopram when I mentioned it as an option.    ----------------    I spent 15 minutes with this patient today. All changes were made via collaborative practice agreement with Lydia Rodriguez MD. A copy of the visit note was provided to the patient's provider(s).    A summary of these recommendations was sent via [employer].    Alberto BrownD  Medication Therapy Management Pharmacist  [facility]      Telemedicine Visit Details  Type of service:  Telephone visit  Start Time:  8:30am  End Time:  8:45am     Medication Therapy Recommendations  Depression, unspecified depression type    Current Medication: DULoxetine (CYMBALTA) 60 MG capsule (Discontinued)   Rationale: Undesirable effect - Adverse medication event - Safety   Recommendation: Change Medication - switch to sertraline, per plan   Status: Accepted per CPA

## 2023-10-25 ENCOUNTER — TELEPHONE (OUTPATIENT)
Dept: NEUROLOGY | Facility: CLINIC | Age: 23
End: 2023-10-25

## 2023-10-25 NOTE — TELEPHONE ENCOUNTER
Prior Authorization Specialty Medication Request    Medication/Dose: galcanezumab-gnlm (EMGALITY) 240 MG/ML injection (Loading Dose); galcanezumab-gnlm (EMGALITY) 120 MG/ML injection (Maintenance Dose)  ICD code (if different than what is on RX):    Previously Tried and Failed:      Important Lab Values:   Rationale:     Insurance Name:   Insurance ID:   Insurance Phone Number:     Pharmacy Information (if different than what is on RX)  Name:    Phone:

## 2023-10-26 ENCOUNTER — TELEPHONE (OUTPATIENT)
Dept: PEDIATRICS | Facility: CLINIC | Age: 23
End: 2023-10-26
Payer: COMMERCIAL

## 2023-10-26 NOTE — TELEPHONE ENCOUNTER
Central Prior Authorization Team - Phone: [phone]     PA Initiation    Medication: EMGALITY 120 MG/ML SC SOAJ  Insurance Company: [employer] - Phone [phone] Fax [phone]  Pharmacy Filling the Rx: [id number] - [place], [place] - [address]  AT [address]  Filling Pharmacy Phone: [phone]  Filling Pharmacy Fax:    Start Date: 10/26/2023

## 2023-10-26 NOTE — TELEPHONE ENCOUNTER
MTM appointment no showed, we made one more attempt to reschedule.     Routing back to referring provider and MTM pharmacist.       Antoni Schreiber, [facility]

## 2023-10-27 NOTE — TELEPHONE ENCOUNTER
Central Prior Authorization Team - Phone: [phone]     Prior Authorization Approval    Medication: EMGALITY 120 MG/ML SC SOAJ  Authorization Effective Date: 9/26/2023  Authorization Expiration Date: 10/25/2024  Approved Dose/Quantity: 1  Reference #:     Insurance Company: [employer] - Phone [phone] Fax [phone]  Expected CoPay: $    CoPay Card Available:      Financial Assistance Needed:   Which Pharmacy is filling the prescription: [id number] - [place], [place] - [address]  AT [address]  Pharmacy Notified: YES  Patient Notified: YES PHARMACY WILL NOTIFY WHEN READY

## 2023-11-06 DIAGNOSIS — N94.6 DYSMENORRHEA: ICD-10-CM

## 2023-11-09 RX ORDER — NORETHINDRONE ACETATE AND ETHINYL ESTRADIOL 1MG-20(21)
1 KIT ORAL DAILY
Qty: 84 TABLET | Refills: 0 | Status: SHIPPED | OUTPATIENT
Start: 2023-11-09 | End: 2024-03-21

## 2023-11-20 ENCOUNTER — VIRTUAL VISIT (OUTPATIENT)
Dept: PHARMACY | Facility: CLINIC | Age: 23
End: 2023-11-20
Payer: COMMERCIAL

## 2023-11-20 DIAGNOSIS — F32.1 MODERATE MAJOR DEPRESSION (H): ICD-10-CM

## 2023-11-20 DIAGNOSIS — F41.1 GAD (GENERALIZED ANXIETY DISORDER): Primary | ICD-10-CM

## 2023-11-20 PROCEDURE — 99207 PR NO CHARGE LOS: CPT | Performed by: PHARMACIST

## 2023-11-20 RX ORDER — SERTRALINE HYDROCHLORIDE 100 MG/1
100 TABLET, FILM COATED ORAL DAILY
Qty: 90 TABLET | Refills: 1 | Status: SHIPPED | OUTPATIENT
Start: 2023-11-20 | End: 2024-01-16

## 2023-11-20 NOTE — PATIENT INSTRUCTIONS
"Recommendations from today's MTM visit:                                                    MTM (medication therapy management) is a service provided by a clinical pharmacist designed to help you get the most of out of your medicines.   Today we reviewed what your medicines are for, how to know if they are working, that your medicines are safe and how to make your medicine regimen as easy as possible.      -increase sertraline from 50mg to 100mg daily  I did send a refill for 90 day supply. If you prefer to get only 30 day supply, you can tell the pharmacy and they can easily change it    Follow-up: 12/18/23 at 9am    It was great speaking with you today.  I value your experience and would be very thankful for your time in providing feedback in our clinic survey. In the next few days, you may receive an email or text message from [employer] with a link to a survey related to your  clinical pharmacist.\"     To schedule another MTM appointment, please call the clinic directly or you may call the MTM scheduling line at [phone] or toll-free at [phone].     My Clinical Pharmacist's contact information:                                                      Please feel free to contact me with any questions or concerns you have.      Ina Freedman, PharmD  Medication Therapy Management Pharmacist  [facility]    "

## 2023-11-20 NOTE — PROGRESS NOTES
Medication Therapy Management (MTM) Encounter    ASSESSMENT:                            Medication Adherence/Access: No issues identified    Depression/Anxiety:   May have noticed some mild improvement since starting the medication, so will continue with dose increase as planned.    PLAN:                            -increase sertraline from 50mg to 100mg daily    Follow-up: 12/18/23 at 9am    SUBJECTIVE/OBJECTIVE:                          Mireya Fritz is a 22 year old female called for a follow-up visit from 9/20/23.       Reason for visit: med check.    Allergies/ADRs: Reviewed in chart  Past Medical History: Reviewed in chart  Tobacco: She reports that she has been smoking other and vaping device. She has never used smokeless tobacco.Nicotine/Tobacco Cessation Plan:   Information offered: Patient not interested at this time  Alcohol: not currently using    Medication Adherence/Access: no issues reported    Depression/Anxiety:   -Sertraline 50mg (Started on 9/20/23)  -buspirone 30 mg BID     At last visit, patient elected to start taking sertraline, with plan to increase to 100mg after one week if well tolerated.  We had follow up scheduled in October, but was rescheduled.    Today, patient reported that she had forgotten she could increase the sertraline dose and has been taking 50mg daily since it was started 2 months ago. She has been quite busy and did miss some doses, but has been consistent for the last few weeks. Overall, she feels that mood is somewhat less depressed, but still struggling with low motivation.  She did notice some headache and upset stomach, but has had other illness and did not get Emgality on time, so thinks unrelated to sertraline.     Reviewed notes for info on previous meds:  -bupropion ER 300mg- couple months; not helpful, but well tolerated    -venlafaxine (maybe for migraines?)  -duloxetine (sweating, weird dreams)  -couple others she doesn't remember; thinks she took  escitalopram when I mentioned it as an option.    ----------------    I spent 10 minutes with this patient today. All changes were made via collaborative practice agreement with Lydia Rodriguez MD. A copy of the visit note was provided to the patient's provider(s).    A summary of these recommendations was sent via clinic portal.    Ina Freedman PharmD  Medication Therapy Management Pharmacist  [facility]    Telemedicine Visit Details  Type of service:  Telephone visit  Start Time:  8:00am  End Time:  8:10am     Medication Therapy Recommendations  Moderate major depression (H)    Current Medication: sertraline (ZOLOFT) 50 MG tablet (Discontinued)   Rationale: Does not understand instructions - Adherence - Adherence   Recommendation: Provide Education - increase sertraline from 50mg to 100mg daily   Status: Accepted per CPA

## 2023-11-20 NOTE — Clinical Note
Trippi- she had forgotten to increase sertraline from 50mg to 100mg, so did that today. Let me know if you have questions! Ina

## 2023-11-21 ENCOUNTER — ALLIED HEALTH/NURSE VISIT (OUTPATIENT)
Dept: PEDIATRICS | Facility: CLINIC | Age: 23
End: 2023-11-21
Payer: COMMERCIAL

## 2023-11-21 VITALS — DIASTOLIC BLOOD PRESSURE: 86 MMHG | OXYGEN SATURATION: 97 % | HEART RATE: 85 BPM | SYSTOLIC BLOOD PRESSURE: 130 MMHG

## 2023-11-21 DIAGNOSIS — Z01.30 BLOOD PRESSURE CHECK: Primary | ICD-10-CM

## 2023-11-21 PROCEDURE — 99207 PR NO CHARGE NURSE ONLY: CPT

## 2023-11-21 NOTE — PROGRESS NOTES
I met with Mireya Fritz at the request of per pt  to recheck her blood pressure.  Blood pressure medications on the med list were reviewed with patient.    Patient has taken all medications as per usual regimen: NA  Patient reports tolerating them without any issues or concerns: NA    Vitals:    11/21/23 0906 11/21/23 0914   BP: 132/88 130/86   BP Location: Right arm Right arm   Patient Position: Chair Chair   Cuff Size: Adult Large Adult Regular   Pulse: 80 85   SpO2: 97% 97%       Blood pressure was taken, previous encounter was reviewed, recorded blood pressure below 140/90.  Patient was discharged and the note will be sent to the provider for final review.      Pt came in for BP check states at her last couple OB appts was told BP high unsure what the readings were. No current concerns or sx     Ariana Han MA 9:19 AM 11/21/2023

## 2023-12-01 ENCOUNTER — NURSE TRIAGE (OUTPATIENT)
Dept: PEDIATRICS | Facility: CLINIC | Age: 23
End: 2023-12-01
Payer: COMMERCIAL

## 2023-12-01 NOTE — TELEPHONE ENCOUNTER
"Provider: Please advise when patient should be seen.    S-(situation): High Blood pressure    B-(background): Patient states she had a preop appointment at [facility] for a weight loss surgery. When they checked her BP it was 158/109. When they did a recheck it was 150\"something\"/119. Patient denies chest pain, headaches, vision changes. Patient is asymptomatic.     A-(assessment): Per RN protocol, patient should be seen in office today.     R-(recommendations): RN advised of home care advise. Patient will await a call back from PCP team regarding appointment and further recommendations. RN reviewed red flag symptoms with patient and when to seek emergent care. No further questions or concerns.       Reason for Disposition   Systolic BP >= 180 OR Diastolic >= 110    Additional Information   Negative: Symptom is main concern (e.g., headache, chest pain)   Negative: Low blood pressure is main concern   Negative: Sounds like a life-threatening emergency to the triager   Negative: Systolic BP >= 160 OR Diastolic >= 100, and any cardiac (e.g., breathing difficulty, chest pain) or neurologic symptoms (e.g., new-onset blurred or double vision)   Negative: Pregnant 20 or more weeks (or postpartum < 6 weeks) with new hand or face swelling   Negative: Pregnant 20 or more weeks (or postpartum < 6 weeks) and Systolic BP >= 160 OR Diastolic >= 110   Negative: Patient sounds very sick or weak to the triager   Negative: Systolic BP >= 200 OR Diastolic >= 120 and having NO cardiac or neurologic symptoms   Negative: Pregnant 20 or more weeks (or postpartum < 6 weeks) with Systolic BP >= 140 OR Diastolic >= 90   Negative: Systolic BP >= 180 OR Diastolic >= 110, and missed most recent dose of blood pressure medication    Protocols used: Blood Pressure - High-A-OH    "

## 2023-12-01 NOTE — TELEPHONE ENCOUNTER
Called patient with provider recommendation. Assisted to schedule with JIN for Monday.     COLIN Morse on 12/1/2023 at 11:41 AM

## 2023-12-01 NOTE — TELEPHONE ENCOUNTER
Recommend extender visit later today or on Monday to recheck and start work up for secondary causes of high blood pressure.      Lydia Rodriguez MD

## 2023-12-04 ENCOUNTER — OFFICE VISIT (OUTPATIENT)
Dept: PEDIATRICS | Facility: CLINIC | Age: 23
End: 2023-12-04
Payer: COMMERCIAL

## 2023-12-04 ENCOUNTER — ANCILLARY PROCEDURE (OUTPATIENT)
Dept: GENERAL RADIOLOGY | Facility: CLINIC | Age: 23
End: 2023-12-04
Attending: PHYSICIAN ASSISTANT
Payer: COMMERCIAL

## 2023-12-04 VITALS
SYSTOLIC BLOOD PRESSURE: 119 MMHG | WEIGHT: 237.7 LBS | BODY MASS INDEX: 36.61 KG/M2 | DIASTOLIC BLOOD PRESSURE: 85 MMHG | OXYGEN SATURATION: 100 % | RESPIRATION RATE: 20 BRPM | HEART RATE: 92 BPM | TEMPERATURE: 97.8 F

## 2023-12-04 DIAGNOSIS — R03.0 ELEVATED BLOOD PRESSURE READING WITHOUT DIAGNOSIS OF HYPERTENSION: Primary | ICD-10-CM

## 2023-12-04 DIAGNOSIS — R06.2 WHEEZING: ICD-10-CM

## 2023-12-04 DIAGNOSIS — R03.0 ELEVATED BLOOD PRESSURE READING WITHOUT DIAGNOSIS OF HYPERTENSION: ICD-10-CM

## 2023-12-04 LAB
ALBUMIN UR-MCNC: NEGATIVE MG/DL
ANION GAP SERPL CALCULATED.3IONS-SCNC: 11 MMOL/L (ref 7–15)
APPEARANCE UR: CLEAR
BACTERIA #/AREA URNS HPF: ABNORMAL /HPF
BASOPHILS # BLD AUTO: 0.1 10E3/UL (ref 0–0.2)
BASOPHILS NFR BLD AUTO: 1 %
BILIRUB UR QL STRIP: NEGATIVE
BUN SERPL-MCNC: 10.7 MG/DL (ref 6–20)
CALCIUM SERPL-MCNC: 9.7 MG/DL (ref 8.6–10)
CHLORIDE SERPL-SCNC: 102 MMOL/L (ref 98–107)
COLOR UR AUTO: YELLOW
CREAT SERPL-MCNC: 0.74 MG/DL (ref 0.51–0.95)
DEPRECATED HCO3 PLAS-SCNC: 24 MMOL/L (ref 22–29)
EGFRCR SERPLBLD CKD-EPI 2021: >90 ML/MIN/1.73M2
EOSINOPHIL # BLD AUTO: 0.7 10E3/UL (ref 0–0.7)
EOSINOPHIL NFR BLD AUTO: 10 %
ERYTHROCYTE [DISTWIDTH] IN BLOOD BY AUTOMATED COUNT: 12.5 % (ref 10–15)
GLUCOSE SERPL-MCNC: 83 MG/DL (ref 70–99)
GLUCOSE UR STRIP-MCNC: NEGATIVE MG/DL
HCT VFR BLD AUTO: 43.4 % (ref 35–47)
HGB BLD-MCNC: 14.5 G/DL (ref 11.7–15.7)
HGB UR QL STRIP: NEGATIVE
IMM GRANULOCYTES # BLD: 0 10E3/UL
IMM GRANULOCYTES NFR BLD: 0 %
KETONES UR STRIP-MCNC: NEGATIVE MG/DL
LEUKOCYTE ESTERASE UR QL STRIP: ABNORMAL
LYMPHOCYTES # BLD AUTO: 2.4 10E3/UL (ref 0.8–5.3)
LYMPHOCYTES NFR BLD AUTO: 32 %
MCH RBC QN AUTO: 29.7 PG (ref 26.5–33)
MCHC RBC AUTO-ENTMCNC: 33.4 G/DL (ref 31.5–36.5)
MCV RBC AUTO: 89 FL (ref 78–100)
MONOCYTES # BLD AUTO: 0.7 10E3/UL (ref 0–1.3)
MONOCYTES NFR BLD AUTO: 9 %
NEUTROPHILS # BLD AUTO: 3.7 10E3/UL (ref 1.6–8.3)
NEUTROPHILS NFR BLD AUTO: 48 %
NITRATE UR QL: NEGATIVE
PH UR STRIP: 6 [PH] (ref 5–7)
PLATELET # BLD AUTO: 227 10E3/UL (ref 150–450)
POTASSIUM SERPL-SCNC: 4.6 MMOL/L (ref 3.4–5.3)
RBC # BLD AUTO: 4.88 10E6/UL (ref 3.8–5.2)
RBC #/AREA URNS AUTO: ABNORMAL /HPF
SODIUM SERPL-SCNC: 137 MMOL/L (ref 135–145)
SP GR UR STRIP: 1.01 (ref 1–1.03)
SQUAMOUS #/AREA URNS AUTO: ABNORMAL /LPF
TSH SERPL DL<=0.005 MIU/L-ACNC: 2.82 UIU/ML (ref 0.3–4.2)
UROBILINOGEN UR STRIP-ACNC: 0.2 E.U./DL
WBC # BLD AUTO: 7.6 10E3/UL (ref 4–11)
WBC #/AREA URNS AUTO: ABNORMAL /HPF

## 2023-12-04 PROCEDURE — 81001 URINALYSIS AUTO W/SCOPE: CPT | Performed by: PHYSICIAN ASSISTANT

## 2023-12-04 PROCEDURE — 99214 OFFICE O/P EST MOD 30 MIN: CPT | Mod: 25 | Performed by: PHYSICIAN ASSISTANT

## 2023-12-04 PROCEDURE — 80048 BASIC METABOLIC PNL TOTAL CA: CPT | Performed by: PHYSICIAN ASSISTANT

## 2023-12-04 PROCEDURE — 36415 COLL VENOUS BLD VENIPUNCTURE: CPT | Performed by: PHYSICIAN ASSISTANT

## 2023-12-04 PROCEDURE — 93000 ELECTROCARDIOGRAM COMPLETE: CPT | Mod: 76 | Performed by: PHYSICIAN ASSISTANT

## 2023-12-04 PROCEDURE — 84443 ASSAY THYROID STIM HORMONE: CPT | Performed by: PHYSICIAN ASSISTANT

## 2023-12-04 PROCEDURE — 85025 COMPLETE CBC W/AUTO DIFF WBC: CPT | Performed by: PHYSICIAN ASSISTANT

## 2023-12-04 PROCEDURE — 71046 X-RAY EXAM CHEST 2 VIEWS: CPT | Mod: TC | Performed by: RADIOLOGY

## 2023-12-04 RX ORDER — PREDNISONE 20 MG/1
40 TABLET ORAL DAILY
Qty: 10 TABLET | Refills: 0 | Status: SHIPPED | OUTPATIENT
Start: 2023-12-04 | End: 2023-12-20

## 2023-12-04 ASSESSMENT — PAIN SCALES - GENERAL: PAINLEVEL: NO PAIN (0)

## 2023-12-04 ASSESSMENT — PATIENT HEALTH QUESTIONNAIRE - PHQ9
SUM OF ALL RESPONSES TO PHQ QUESTIONS 1-9: 15
SUM OF ALL RESPONSES TO PHQ QUESTIONS 1-9: 15
10. IF YOU CHECKED OFF ANY PROBLEMS, HOW DIFFICULT HAVE THESE PROBLEMS MADE IT FOR YOU TO DO YOUR WORK, TAKE CARE OF THINGS AT HOME, OR GET ALONG WITH OTHER PEOPLE: VERY DIFFICULT

## 2023-12-04 NOTE — PROGRESS NOTES
Assessment & Plan     Elevated blood pressure reading without diagnosis of hypertension  Proceed with labs for further evaluation.   EKG normal.  Patient to continue to monitor BPs.   - Basic metabolic panel  (Ca, Cl, CO2, Creat, Gluc, K, Na, BUN); Future  - UA Macroscopic with reflex to Microscopic and Culture - Lab Collect; Future  - EKG 12-lead complete w/read - Clinics  - TSH with free T4 reflex; Future  - CBC with platelets and differential; Future  - XR Chest 2 Views; Future  - Basic metabolic panel  (Ca, Cl, CO2, Creat, Gluc, K, Na, BUN)  - UA Macroscopic with reflex to Microscopic and Culture - Lab Collect  - TSH with free T4 reflex  - CBC with platelets and differential  - EKG 12-lead complete w/read - Clinics  - UA Microscopic with Reflex to Culture    Wheezing  Begin oral prednisone daily.   - predniSONE (DELTASONE) 20 MG tablet; Take 2 tablets (40 mg) by mouth daily    Gino Arredondo PA-C  [facility] [place] is a 23 year old, presenting for the following health issues:  Hypertension    History of Present Illness       Reason for visit:  High blood pressure  Symptom onset:  More than a month  Symptoms include:  High blood pressure  Symptom intensity:  Moderate  Symptom progression:  Staying the same  Had these symptoms before:  No    She eats 0-1 servings of fruits and vegetables daily.She consumes 1 sweetened beverage(s) daily.She exercises with enough effort to increase her heart rate 30 to 60 minutes per day.  She exercises with enough effort to increase her heart rate 5 days per week. She is missing 1 dose(s) of medications per week.     12/27/23--scheduled for a weight loss procedure at [facility].     Patient had an OB appointment a few months ago and BP was elevated. BP three days ago was 158/109 and [facility] would not complete procedure.   Yesterday was 145/100,144/97, 138/98 using dad's BP machine at home.     Grandparents with HTN.     No  headache, lightheadedness, cp, palpitations, sob, abd pain, nausea.     URI symptoms x few weeks. Coughing, wheezing present. No f/c. Body aches mild. Nasal congestion, ST present. Waxes and wanes.     Works with children.    Review of Systems   Constitutional, HEENT, cardiovascular, pulmonary, gi and gu systems are negative, except as otherwise noted.      Objective    /85 (BP Location: Right arm, Patient Position: Sitting, Cuff Size: Adult Large)   Pulse 92   Temp 97.8  F (36.6  C) (Tympanic)   Resp 20   Wt 107.8 kg (237 lb 11.2 oz)   SpO2 100%   BMI 36.61 kg/m    Body mass index is 36.61 kg/m .  Physical Exam   GENERAL: alert and no distress  EYES: Eyes grossly normal to inspection, PERRL and conjunctivae and sclerae normal  HENT: ear canals and TM's normal, nose and mouth without ulcers or lesions  NECK: no adenopathy  RESP: diminished breath sounds throughout; wheezing and rhonchi present  CV: regular rate and rhythm, normal S1 S2, no S3 or S4  ABDOMEN: soft, nontender    No results found for any visits on 12/04/23.

## 2023-12-05 ENCOUNTER — TELEPHONE (OUTPATIENT)
Dept: PEDIATRICS | Facility: CLINIC | Age: 23
End: 2023-12-05
Payer: COMMERCIAL

## 2023-12-05 NOTE — TELEPHONE ENCOUNTER
1st attempt: called pt to sched bp check on MA/LPN sched.    PATRICIA CANALES on 12/5/2023 at 3:16 PM

## 2023-12-12 ENCOUNTER — ALLIED HEALTH/NURSE VISIT (OUTPATIENT)
Dept: FAMILY MEDICINE | Facility: CLINIC | Age: 23
End: 2023-12-12
Payer: COMMERCIAL

## 2023-12-12 ENCOUNTER — TELEPHONE (OUTPATIENT)
Dept: PEDIATRICS | Facility: CLINIC | Age: 23
End: 2023-12-12

## 2023-12-12 VITALS — HEART RATE: 80 BPM | SYSTOLIC BLOOD PRESSURE: 114 MMHG | DIASTOLIC BLOOD PRESSURE: 88 MMHG

## 2023-12-12 DIAGNOSIS — R03.0 ELEVATED BLOOD PRESSURE READING WITHOUT DIAGNOSIS OF HYPERTENSION: Primary | ICD-10-CM

## 2023-12-12 PROCEDURE — 99207 PR NO CHARGE NURSE ONLY: CPT

## 2023-12-12 NOTE — PROGRESS NOTES
Mireya Fritz is a 23 year old patient who comes in today for a Blood Pressure check.  Initial BP:  /88 (BP Location: Right arm, Patient Position: Sitting)   Pulse 80      80  Disposition: results routed to provider     Patient has been monitoring her blood pressure at home.   Readings:  125/101  125/90  129/94  122/92    Lisa Magill, CMA

## 2023-12-12 NOTE — TELEPHONE ENCOUNTER
The trend is toward improvement.    The prednisone that patient was on last week would have increased her blood pressure also.    I recommend coming back for a nurse only bp check again in about 2 weeks and having it checked when she meets with [facility] pharmacy next week.    If BP not lower at the next check, I would recommend holding birth control pills and following blood pressure for another 2 weeks.      Lydia Rodriguez MD

## 2023-12-12 NOTE — TELEPHONE ENCOUNTER
Patient came in today for a nurse only blood pressure check.  BP Readings from Last 3 Encounters:   12/12/23 114/88   12/04/23 119/85   11/21/23 130/86      Patient has been monitoring her blood pressure at home.   Readings:  125/101  125/90  129/94  122/92    Lisa Magill, CMA

## 2023-12-14 ENCOUNTER — MYC MEDICAL ADVICE (OUTPATIENT)
Dept: PEDIATRICS | Facility: CLINIC | Age: 23
End: 2023-12-14
Payer: COMMERCIAL

## 2023-12-15 DIAGNOSIS — F41.1 GAD (GENERALIZED ANXIETY DISORDER): ICD-10-CM

## 2023-12-15 RX ORDER — BUSPIRONE HYDROCHLORIDE 30 MG/1
30 TABLET ORAL 2 TIMES DAILY
Qty: 180 TABLET | Refills: 2 | Status: SHIPPED | OUTPATIENT
Start: 2023-12-15 | End: 2024-01-16

## 2023-12-18 ENCOUNTER — VIRTUAL VISIT (OUTPATIENT)
Dept: PHARMACY | Facility: CLINIC | Age: 23
End: 2023-12-18
Payer: COMMERCIAL

## 2023-12-18 DIAGNOSIS — F41.1 GAD (GENERALIZED ANXIETY DISORDER): ICD-10-CM

## 2023-12-18 DIAGNOSIS — F32.1 MODERATE MAJOR DEPRESSION (H): Primary | ICD-10-CM

## 2023-12-18 PROCEDURE — 99207 PR NO CHARGE LOS: CPT | Performed by: PHARMACIST

## 2023-12-18 NOTE — PATIENT INSTRUCTIONS
"Recommendations from today's MTM visit:                                                         Continue current medication.      Follow-up: I will call you on 1/16/24 at 8:30am    It was great speaking with you today.  I value your experience and would be very thankful for your time in providing feedback in our clinic survey. In the next few days, you may receive an email or text message from [employer] with a link to a survey related to your  clinical pharmacist.\"     To schedule another MTM appointment, please call the clinic directly or you may call the MTM scheduling line at [phone] or toll-free at [phone].     My Clinical Pharmacist's contact information:                                                      Please feel free to contact me with any questions or concerns you have.      Ina Freedman, PharmD  Medication Therapy Management Pharmacist  [facility]  "

## 2023-12-18 NOTE — PROGRESS NOTES
Medication Therapy Management (MTM) Encounter    ASSESSMENT:                            Medication Adherence/Access: No issues identified    Depression/Anxiety:   Improved with recent sertraline dose increase. Will plan to continue current dose, though discussed moving to morning administration in case contributing to some stimulating effect overnight causing sweating. Pt agreed.     PLAN:                            Continue current medication.    Follow-up: 1/16/24    SUBJECTIVE/OBJECTIVE:                          Mireya Fritz is a 23 year old female called for a follow-up visit from 11/20/23.       Reason for visit: med check.    Allergies/ADRs: Reviewed in chart  Past Medical History: Reviewed in chart  Tobacco: She reports that she has been smoking other and vaping device. She has never used smokeless tobacco.Nicotine/Tobacco Cessation Plan:   Information offered: Patient not interested at this time  Alcohol: not currently using    Medication Adherence/Access: no issues reported    Depression/Anxiety:   -Sertraline 100mg daily at bedtime (Started on 9/20/23, increased on 11/20)  -buspirone 30 mg BID     At last visit, patient elected to increase sertraline from 50mg to 100mg daily.  Today, she reported feeling that overall mood is feeling better, more positive, and low motivation is also feeling better. She stated that her sleep is pretty good, but sometimes notices some sweating overnight. She did notice this with duloxetine, which improved after stopping it, and has seemed to start back up again. Pt would like to continue current dosing for now.     Reviewed notes for info on previous meds:  -bupropion ER 300mg- couple months; not helpful, but well tolerated    -venlafaxine (maybe for migraines?)  -duloxetine (sweating, weird dreams)  -couple others she doesn't remember; thinks she took escitalopram when I mentioned it as an option.    ----------------      I spent 10 minutes with this patient today. All  changes were made via collaborative practice agreement with Lydia Rodriguez MD. A copy of the visit note was provided to the patient's provider(s).    A summary of these recommendations was sent via clinic portal.    Ina Freedman PharmD  Medication Therapy Management Pharmacist  [facility]      Telemedicine Visit Details  Type of service:  Telephone visit  Start Time:  9:10am  End Time:  9:20am     Medication Therapy Recommendations  No medication therapy recommendations to display

## 2023-12-20 ENCOUNTER — ANCILLARY PROCEDURE (OUTPATIENT)
Dept: GENERAL RADIOLOGY | Facility: CLINIC | Age: 23
End: 2023-12-20
Attending: PHYSICIAN ASSISTANT
Payer: COMMERCIAL

## 2023-12-20 ENCOUNTER — OFFICE VISIT (OUTPATIENT)
Dept: PEDIATRICS | Facility: CLINIC | Age: 23
End: 2023-12-20
Payer: COMMERCIAL

## 2023-12-20 VITALS
SYSTOLIC BLOOD PRESSURE: 110 MMHG | OXYGEN SATURATION: 92 % | BODY MASS INDEX: 35.27 KG/M2 | DIASTOLIC BLOOD PRESSURE: 80 MMHG | HEART RATE: 95 BPM | WEIGHT: 229 LBS | TEMPERATURE: 98.1 F | RESPIRATION RATE: 16 BRPM

## 2023-12-20 DIAGNOSIS — R05.1 ACUTE COUGH: ICD-10-CM

## 2023-12-20 DIAGNOSIS — J40 BRONCHITIS: Primary | ICD-10-CM

## 2023-12-20 PROCEDURE — 71046 X-RAY EXAM CHEST 2 VIEWS: CPT | Mod: TC | Performed by: RADIOLOGY

## 2023-12-20 PROCEDURE — 99214 OFFICE O/P EST MOD 30 MIN: CPT | Performed by: PHYSICIAN ASSISTANT

## 2023-12-20 RX ORDER — ALBUTEROL SULFATE 90 UG/1
2 AEROSOL, METERED RESPIRATORY (INHALATION) EVERY 6 HOURS PRN
Qty: 18 G | Refills: 0 | Status: SHIPPED | OUTPATIENT
Start: 2023-12-20

## 2023-12-20 RX ORDER — FLUTICASONE PROPIONATE 110 UG/1
2 AEROSOL, METERED RESPIRATORY (INHALATION) 2 TIMES DAILY
Qty: 12 G | Refills: 0 | Status: SHIPPED | OUTPATIENT
Start: 2023-12-20 | End: 2024-01-16

## 2023-12-20 RX ORDER — PREDNISONE 20 MG/1
60 TABLET ORAL DAILY
Qty: 15 TABLET | Refills: 0 | Status: SHIPPED | OUTPATIENT
Start: 2023-12-20 | End: 2024-01-16

## 2023-12-20 RX ORDER — AZITHROMYCIN 250 MG/1
TABLET, FILM COATED ORAL
Qty: 6 TABLET | Refills: 0 | Status: SHIPPED | OUTPATIENT
Start: 2023-12-20 | End: 2024-01-16

## 2023-12-20 ASSESSMENT — PATIENT HEALTH QUESTIONNAIRE - PHQ9
SUM OF ALL RESPONSES TO PHQ QUESTIONS 1-9: 11
10. IF YOU CHECKED OFF ANY PROBLEMS, HOW DIFFICULT HAVE THESE PROBLEMS MADE IT FOR YOU TO DO YOUR WORK, TAKE CARE OF THINGS AT HOME, OR GET ALONG WITH OTHER PEOPLE: VERY DIFFICULT
SUM OF ALL RESPONSES TO PHQ QUESTIONS 1-9: 11

## 2023-12-20 ASSESSMENT — PAIN SCALES - GENERAL: PAINLEVEL: NO PAIN (0)

## 2023-12-20 NOTE — LETTER
December 20, 2023      Mireya Fritz  [address] [place] [address]        To Whom It May Concern:    Mireya Fritz  was seen on 12/20/23.  Please excuse her through 12/22/23 due to illness.        Sincerely,        Gino Arredondo PA-C

## 2023-12-20 NOTE — PROGRESS NOTES
Assessment & Plan     (J40) Bronchitis  (primary encounter diagnosis)  Comment: worsening symptoms with low oxy sats.   Cover for infection with antibiotics. Begin oral pred burst and ICS x one month. Begin albuterol three times daily PRN. Signs for emergent evaluation discussed with patient.  Plan: XR Chest 2 Views, azithromycin (ZITHROMAX) 250         MG tablet, predniSONE (DELTASONE) 20 MG tablet,        fluticasone (FLOVENT HFA) 110 MCG/ACT inhaler,         albuterol (PROAIR HFA/PROVENTIL HFA/VENTOLIN         HFA) 108 (90 Base) MCG/ACT inhaler          OBEY Rider [facility] REBECCA Gomes is a 23 year old, presenting for the following health issues:        HPI   Acute Illness  Acute illness concerns: Cough  Onset/Duration: about a month, negative covid test last night  Symptoms:  Fever: yes on and off  Chills/Sweats: No  Headache (location?): No  Sinus Pressure: YES  Conjunctivitis:  No  Ear Pain: no  Rhinorrhea: YES  Congestion: YES  Sore Throat: No  Cough: YES-productive of green sputum  Wheeze: YES  Decreased Appetite: No  Nausea: No  Vomiting: No  Diarrhea: No  Dysuria/Freq.: No  Dysuria or Hematuria: No  Fatigue/Achiness: YES  Sick/Strep Exposure: No  Therapies tried and outcome: None    Patient returns today. Cough persists. Improved after prednisone.    Review of Systems   Constitutional, HEENT, cardiovascular, pulmonary, gi and gu systems are negative, except as otherwise noted.      Objective    /80   Pulse 95   Temp 98.1  F (36.7  C)   Resp 16   Wt 103.9 kg (229 lb)   SpO2 92%   BMI 35.27 kg/m    Body mass index is 35.27 kg/m .  Physical Exam   GENERAL:alert and no distress  EYES: Eyes grossly normal to inspection, PERRL and conjunctivae and sclerae normal  HENT: ear canals and TM's normal, nose and mouth without ulcers or lesions  NECK: no adenopathy  RESP: moderately diminished breath sounds; wheezing and rhonchi throughout  CV:  regular rate and rhythm, normal S1 S2, no S3 or S4,    CXR appears similar to previous  No results found for any visits on 12/20/23.         home

## 2023-12-20 NOTE — PATIENT INSTRUCTIONS
Begin zpak  Rest and fluids  No work  Start prednisone in the AM with food   Begin flovent twice daily --rinse out mouth afterwards  Begin albuterol three times daily x one week, then as needed   Follow up in one week if needed

## 2024-01-16 ENCOUNTER — VIRTUAL VISIT (OUTPATIENT)
Dept: PHARMACY | Facility: CLINIC | Age: 24
End: 2024-01-16
Payer: COMMERCIAL

## 2024-01-16 DIAGNOSIS — J40 BRONCHITIS: ICD-10-CM

## 2024-01-16 DIAGNOSIS — F41.1 GAD (GENERALIZED ANXIETY DISORDER): ICD-10-CM

## 2024-01-16 DIAGNOSIS — F32.1 MODERATE MAJOR DEPRESSION (H): Primary | ICD-10-CM

## 2024-01-16 DIAGNOSIS — G43.009 MIGRAINE WITHOUT AURA AND WITHOUT STATUS MIGRAINOSUS, NOT INTRACTABLE: ICD-10-CM

## 2024-01-16 DIAGNOSIS — L29.9 ITCHING: ICD-10-CM

## 2024-01-16 DIAGNOSIS — Z78.9 USES BIRTH CONTROL: ICD-10-CM

## 2024-01-16 PROCEDURE — 99207 PR NO CHARGE LOS: CPT | Mod: 93 | Performed by: PHARMACIST

## 2024-01-16 RX ORDER — HYDROXYZINE HYDROCHLORIDE 10 MG/1
10 TABLET, FILM COATED ORAL AT BEDTIME
COMMUNITY
End: 2024-05-20

## 2024-01-16 RX ORDER — CETIRIZINE HYDROCHLORIDE 10 MG/1
10 TABLET ORAL DAILY
COMMUNITY
End: 2024-05-20

## 2024-01-16 RX ORDER — BUSPIRONE HYDROCHLORIDE 30 MG/1
30 TABLET ORAL 2 TIMES DAILY
Qty: 180 TABLET | Refills: 3 | Status: SHIPPED | OUTPATIENT
Start: 2024-01-16

## 2024-01-16 RX ORDER — CLOBETASOL PROPIONATE 0.5 MG/G
CREAM TOPICAL 2 TIMES DAILY PRN
COMMUNITY

## 2024-01-16 RX ORDER — SERTRALINE HYDROCHLORIDE 100 MG/1
100 TABLET, FILM COATED ORAL DAILY
Qty: 90 TABLET | Refills: 3 | Status: SHIPPED | OUTPATIENT
Start: 2024-01-16

## 2024-01-16 NOTE — PATIENT INSTRUCTIONS
"Recommendations from today's MTM visit:                                                         Continue current medication.      Follow-up: 6 months or sooner if needed    It was great speaking with you today.  I value your experience and would be very thankful for your time in providing feedback in our clinic survey. In the next few days, you may receive an email or text message from [employer] with a link to a survey related to your  clinical pharmacist.\"     To schedule another MTM appointment, please call the clinic directly or you may call the MTM scheduling line at [phone] or toll-free at [phone].     My Clinical Pharmacist's contact information:                                                      Please feel free to contact me with any questions or concerns you have.      Ina Freedman, PharmD  Medication Therapy Management Pharmacist  [facility]    "

## 2024-01-16 NOTE — PROGRESS NOTES
Medication Therapy Management (MTM) Encounter    ASSESSMENT:                            Medication Adherence/Access: No issues identified    Depression/Anxiety:   Getting back on track with consistent dosing since recovering from surgery. Continue current medication.     Bronchitis:    Stable. Continue current medication.    Itching:    Stable. Continue current medication.    Contraception:  Stable. Continue current medication.     Migraine:   Stable. Continue current medication.    PLAN:                            Continue current medication.    Follow-up: 6 months    SUBJECTIVE/OBJECTIVE:                          iMreya Fritz is a 23 year old female called for a follow-up visit from 12/18/23.     This is the first visit of 2024.    Reason for visit: med review.    Allergies/ADRs: Reviewed in chart  Past Medical History: Reviewed in chart  Tobacco: She reports that she has been smoking other and vaping device. She has never used smokeless tobacco.Nicotine/Tobacco Cessation Plan:   Information offered: Patient not interested at this time    Alcohol: not currently using    Medication Adherence/Access: no issues reported    Depression/Anxiety:   -Sertraline 100mg daily at bedtime (Started on 9/20/23, increased on 11/20)  -buspirone 30 mg BID     She had surgery on 12/27/23 and stated that she was feeling quite well prior to that. Med consistency was disrupted during recovery, but she is feeling back on track again.Overall sleep has been ok, but somewhat disrupted since surgery. She has been fairly inactive during the day, so not as tired at night. Getting better.  Overnight sweating resolved.     Reviewed notes for info on previous meds:  -bupropion ER 300mg- couple months; not helpful, but well tolerated    -venlafaxine (maybe for migraines?)  -duloxetine (sweating, weird dreams)  -couple others she doesn't remember; thinks she took escitalopram when I mentioned it as an option.     Bronchitis:    -albuterol as  needed  Feeling better. Finished antibiotic and prednisone. Still using albuterol as needed for some chest tightness, which is helpful.     Itching:    -cetirizine 10mg every morning  -hydroxyzine 10mg nightly  -clobetasol cream as needed    She was experiencing itching and started this regimen a few months ago, which has been helpful. Does not notice any itching when she is consistent with dosing. No concerns.    Contraception:  -norethindrone-ethinyl estradiol FE 1/20--takes continuously.  No issues.     Migraine:   Preventive medications  Emgality 120 mg monthly  Acute medications  None    Has not had a migraine in many months. Emgality has been incredibly helpful. No issues.    ----------------    I spent 15 minutes with this patient today. Changes were made per collaborative practice agreement with Lydia Rodriguez. A copy of the visit note was provided to the patient's provider(s).    A summary of these recommendations was sent via clinic portal.    Ina Freedman, PharmD  Medication Therapy Management Pharmacist  [facility]    Telemedicine Visit Details  Type of service:  Telephone visit  Start Time:  8:30am  End Time:  8:45am     Medication Therapy Recommendations  No medication therapy recommendations to display

## 2024-02-05 ENCOUNTER — TELEPHONE (OUTPATIENT)
Dept: PEDIATRICS | Facility: CLINIC | Age: 24
End: 2024-02-05
Payer: COMMERCIAL

## 2024-02-05 NOTE — TELEPHONE ENCOUNTER
Patient called back and I scheduled her for an appointment with provider Mansi Johansen for Tuesday 2/6/24 @ 10:10 am.

## 2024-02-05 NOTE — TELEPHONE ENCOUNTER
Reason for Call:  Appointment Request    Patient requesting this type of appt:  Lower back pain (work comp injury)    Requested provider: Lydia Rodriguez    Reason patient unable to be scheduled: Not within requested timeframe    When does patient want to be seen/preferred time: 1-2 days    Comments: Patient injured back at work on 1/31/2024 and was instructed to schedule an appointment within the next couple days. Unable to find available appointment. Willing to schedule with a different provider if need be.     Could we send this information to you in [email or web address] or would you prefer to receive a phone call?:   No preference   Okay to leave a detailed message?: Yes at Cell number on file:    Telephone Information:   Mobile [phone]       Call taken on 2/5/2024 at 12:37 PM by Lulú Post

## 2024-02-06 ENCOUNTER — OFFICE VISIT (OUTPATIENT)
Dept: PEDIATRICS | Facility: CLINIC | Age: 24
End: 2024-02-06
Payer: OTHER MISCELLANEOUS

## 2024-02-06 VITALS
OXYGEN SATURATION: 99 % | SYSTOLIC BLOOD PRESSURE: 112 MMHG | BODY MASS INDEX: 34.04 KG/M2 | DIASTOLIC BLOOD PRESSURE: 86 MMHG | HEART RATE: 96 BPM | TEMPERATURE: 98 F | WEIGHT: 221 LBS | RESPIRATION RATE: 20 BRPM

## 2024-02-06 DIAGNOSIS — S39.012A STRAIN OF MUSCLE, FASCIA AND TENDON OF LOWER BACK, INITIAL ENCOUNTER: ICD-10-CM

## 2024-02-06 DIAGNOSIS — S39.92XA BACK INJURY, INITIAL ENCOUNTER: Primary | ICD-10-CM

## 2024-02-06 PROCEDURE — 99213 OFFICE O/P EST LOW 20 MIN: CPT | Performed by: PHYSICIAN ASSISTANT

## 2024-02-06 RX ORDER — ORPHENADRINE CITRATE 100 MG/1
100 TABLET, EXTENDED RELEASE ORAL 2 TIMES DAILY
Qty: 20 TABLET | Refills: 0 | Status: SHIPPED | OUTPATIENT
Start: 2024-02-06 | End: 2024-02-16

## 2024-02-06 RX ORDER — METHYLPREDNISOLONE 4 MG
TABLET, DOSE PACK ORAL
Qty: 21 TABLET | Refills: 0 | Status: SHIPPED | OUTPATIENT
Start: 2024-02-06 | End: 2024-05-20

## 2024-02-06 ASSESSMENT — PATIENT HEALTH QUESTIONNAIRE - PHQ9
SUM OF ALL RESPONSES TO PHQ QUESTIONS 1-9: 13
SUM OF ALL RESPONSES TO PHQ QUESTIONS 1-9: 13
10. IF YOU CHECKED OFF ANY PROBLEMS, HOW DIFFICULT HAVE THESE PROBLEMS MADE IT FOR YOU TO DO YOUR WORK, TAKE CARE OF THINGS AT HOME, OR GET ALONG WITH OTHER PEOPLE: SOMEWHAT DIFFICULT

## 2024-02-06 ASSESSMENT — PAIN SCALES - GENERAL: PAINLEVEL: SEVERE PAIN (6)

## 2024-02-06 ASSESSMENT — ENCOUNTER SYMPTOMS: BACK PAIN: 1
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
## 2024-02-08 ENCOUNTER — TELEPHONE (OUTPATIENT)
Dept: PEDIATRICS | Facility: CLINIC | Age: 24
End: 2024-02-08
Payer: COMMERCIAL

## 2024-02-08 NOTE — TELEPHONE ENCOUNTER
Pt calls to request return to work note with date of return included. Pt states Mychart letter is OK. Pt seen for OV 2/6/24. Please review and advise, thanks!  Alex Gonzalez RN on 2/8/2024 at 11:41 AM

## 2024-02-08 NOTE — TELEPHONE ENCOUNTER
[place] - Why am I receiving this? According to your last phone note, Pt's PCP had already ordered home health and we cancelled the home health referral that I placed. So, no, the orders should go to PCP. See MyChart note for details. Send patient message to get more information about the note.     Mansi Johansen PA-C

## 2024-03-21 DIAGNOSIS — N94.6 DYSMENORRHEA: ICD-10-CM

## 2024-03-21 RX ORDER — NORETHINDRONE ACETATE AND ETHINYL ESTRADIOL 1MG-20(21)
1 KIT ORAL DAILY
Qty: 84 TABLET | Refills: 0 | Status: SHIPPED | OUTPATIENT
Start: 2024-03-21 | End: 2024-05-23

## 2024-05-13 ENCOUNTER — MYC REFILL (OUTPATIENT)
Dept: NEUROLOGY | Facility: CLINIC | Age: 24
End: 2024-05-13
Payer: COMMERCIAL

## 2024-05-13 DIAGNOSIS — G43.009 MIGRAINE WITHOUT AURA AND WITHOUT STATUS MIGRAINOSUS, NOT INTRACTABLE: ICD-10-CM

## 2024-05-14 ENCOUNTER — TELEPHONE (OUTPATIENT)
Dept: NEUROLOGY | Facility: CLINIC | Age: 24
End: 2024-05-14
Payer: COMMERCIAL

## 2024-05-14 NOTE — TELEPHONE ENCOUNTER
Called patient to advise that follow up is needed as the provider has not seen patient in one year virtualy. Also advised patient we had received a refill request Emgality. Patient states that she does not want to schedule at this time but would like at least one more refill. Advised patient that I would reach out to provider and get back to her on the response. Patient understands.      Jocy Collins MA

## 2024-05-14 NOTE — TELEPHONE ENCOUNTER
Advised patient that the provider is willing to provide a 3 month refill, but patient does need to schedule for a follow up soon. Advised patient that insurance requires patients follow up at least once a year in order to continue covering the cost of meds. Patient is wondering if we think her insurance will cover the current fill being sent to pharmacy, advised patient to check with her insurance company and see, and to get scheduled for a follow up asap. Patient states she understands and will schedule soon.       Jocy Collins MA

## 2024-05-20 ENCOUNTER — VIRTUAL VISIT (OUTPATIENT)
Dept: NEUROLOGY | Facility: CLINIC | Age: 24
End: 2024-05-20
Payer: COMMERCIAL

## 2024-05-20 DIAGNOSIS — G43.009 MIGRAINE WITHOUT AURA AND WITHOUT STATUS MIGRAINOSUS, NOT INTRACTABLE: ICD-10-CM

## 2024-05-20 PROCEDURE — G2211 COMPLEX E/M VISIT ADD ON: HCPCS | Mod: 95

## 2024-05-20 PROCEDURE — 99214 OFFICE O/P EST MOD 30 MIN: CPT | Mod: 95

## 2024-05-20 ASSESSMENT — HEADACHE IMPACT TEST (HIT 6)
WHEN YOU HAVE A HEADACHE HOW OFTEN DO YOU WISH YOU COULD LIE DOWN: ALWAYS
HOW OFTEN DO HEADACHES LIMIT YOUR DAILY ACTIVITIES: ALWAYS
HOW OFTEN HAVE YOU FELT FED UP OR IRRITATED BECAUSE OF YOUR HEADACHES: SOMETIMES
HOW OFTEN HAVE YOU FELT TOO TIRED TO WORK BECAUSE OF YOUR HEADACHES: SOMETIMES
HIT6 TOTAL SCORE: 67
WHEN YOU HAVE HEADACHES HOW OFTEN IS THE PAIN SEVERE: VERY OFTEN
HOW OFTEN DID HEADACHS LIMIT CONCENTRATION ON WORK OR DAILY ACTIVITY: SOMETIMES

## 2024-05-20 ASSESSMENT — PATIENT HEALTH QUESTIONNAIRE - PHQ9: SUM OF ALL RESPONSES TO PHQ QUESTIONS 1-9: 15

## 2024-05-20 ASSESSMENT — MIGRAINE DISABILITY ASSESSMENT (MIDAS)
HOW MANY DAYS DID YOU NOT DO HOUSEWORK BECAUSE OF HEADACHES: 2
HOW MANY DAYS WAS YOUR PRODUCTIVITY CUT IN HALF BECAUSE OF HEADACHES: 4
ON A SCALE FROM 0-10 ON AVERAGE HOW PAINFUL WERE HEADACHES: 6
HOW MANY DAYS DID YOU MISS WORK OR SCHOOL BECAUSE OF HEADACHES: 2
TOTAL SCORE: 16
HOW MANY DAYS WAS HOUSEWORK PRODUCTIVITY CUT IN HALF DUE TO HEADACHES: 4
HOW MANY DAYS IN THE PAST 3 MONTHS HAVE YOU HAD A HEADACHE: 4
HOW OFTEN WERE SOCIAL ACTIVITIES MISSED DUE TO HEADACHES: 4

## 2024-05-20 NOTE — NURSING NOTE
Is the patient currently in the state of [place]? YES    Visit mode:VIDEO    If the visit is dropped, the patient can be reconnected by: TELEPHONE VISIT: Phone number:   Telephone Information:   Mobile [phone]       Will anyone else be joining the visit? NO  (If patient encounters technical issues they should call [phone]    How would you like to obtain your AVS? MyChart    Are changes needed to the allergy or medication list? Pt stated no med changes    Are refills needed on medications prescribed by this physician? NO    Reason for visit: Video Visit (Follow-up )    Courtney VACA

## 2024-05-20 NOTE — PROGRESS NOTES
"Virtual Visit Details    Type of service:  Video Visit     Originating Location (pt. Location): Home    Distant Location (provider location):  On-site  Platform used for Video Visit: [facility]    Headache Neurology Progress Note    May 20, 2024    Subjective:    Mireya \"Christiana\" presents for follow up of migraine without aura.     I last saw Christiana 3/27/2023. From initial headache encounter: \"She describes her headaches as a bilateral throbbing pain, located primarily at the temples.  She will have associated photophobia and phonophobia.  She is sensitive to smells at baseline.  She will be nauseous and can rarely vomit.  Her right eye can have blurred vision and she can see sparkles with severe headaches.\"  At baseline, she reported 14+/30 headache days per month with 5/30 severe headache days per month.     At last visit, I recommended a trial of eletriptan and Emgality.    She states that her headaches are much improved.   Christiana currently reports 1-2/30 headache days per month, with 1-2/30 severe headache days per month.     When she has a migraine, it will still last most of the day, but is now more like a 7/10 rather than 9/10, though overall can still be debilitating.     She will have breakthrough headaches when Emgality is wearing off (she has done a few injections late).     She is no longer taking topiramate.      Current headache treatments:  Acute therapies:  - Excedrin   - Eletriptan - has not tried yet     Preventative therapies:  - Emgality 120 mg - effective  - Sertraline 100 mg (depression, anxiety)  - Buspirone (depression, anxiety)     Supportive therapies:  - Massage  - Ice     Previous treatments tried:  Acute therapies:  - Acetaminophen - not effective  - Ibuprofen - not effective  - Naproxen - not effective  - Sumatriptan 100 mg - not effective  - Prochlorperazine 10 mg - not effective  - Rizatriptan 10 mg - not effective     Preventative therapies:  - " Buproprion 150 mg  - Venlaxafine 150 mg  - Topiramate 100 mg nightly - not effective  - Fluoxetine 40 mg   - Duloxetine 60 mg nightly     Supportive therapies:             3/27/2023     9:44 AM 5/20/2024     7:54 AM   HIT-6   When you have headaches, how often is the pain severe 13 11   How often do headaches limit your ability to do usual daily activities including household work, work, school, or social activities? 13 13   When you have a headache, how often do you wish you could lie down? 13 13   In the past 4 weeks, how often have you felt too tired to do work or daily activities because of your headaches 11 10   In the past 4 weeks, how often have you felt fed up or irritated because of your headaches 11 10   In the past 4 weeks, how often did headaches limit your ability to concentrate on work or daily activities 13 10   HIT-6 Total Score 74 67           3/27/2023     9:45 AM 5/20/2024     7:59 AM   MIDAS - in the past three months:   On how many days did you miss work or school because of your headaches? 4 2   How many days was your productivity at work or school reduced by half or more because of your headaches? 5 4   On how many days did you not do household work because of your headaches? 10 2   How many days was your productivity in household work reduced by half or more because of your headaches? 10 4   On how many days did you miss family, social, or leisure activities because of your headaches? 10 4   On how many days did you have a headache? 26 4   On a scale of 0-10, on average how painful were these headaches? 8 6   MIDAS Score 39 (IV - Severe Disability) 16 (III - Moderate Disability)        Depression Screening Follow-up        5/20/2024     8:01 AM   PHQ   PHQ-9 Total Score 15   Q9: Thoughts of better off dead/self-harm past 2 weeks Not at all         5/20/2024     8:01 AM   Last PHQ-9   1.  Little interest or pleasure in doing things 2   2.  Feeling down, depressed, or hopeless 1   3.  Trouble  falling or staying asleep, or sleeping too much 2   4.  Feeling tired or having little energy 3   5.  Poor appetite or overeating 3   6.  Feeling bad about yourself 3   7.  Trouble concentrating 0   8.  Moving slowly or restless 1   Q9: Thoughts of better off dead/self-harm past 2 weeks 0   PHQ-9 Total Score 15   Difficulty at work, home, or with people Somewhat difficult       Does the patient currently have a mental health provider?  Yes, patient was referred back to current mental health provider.    MERCEDES GONZALES PA-C        Objective:    Vitals: There were no vitals taken for this visit.  General: Cooperative, NAD  Neurologic Exam:  Mental Status: Fully alert, attentive and oriented. Speech is clear and fluent.   Cranial Nerves: Facial movements symmetric.   Motor: No abnormal movements.        Assessment and Plan:   Mireya is a 23 year old female who presents for follow up of migraine without aura.     We discussed the following treatment strategy:  - For acute treatment of mild headache, she may use Excedrin as needed, not to exceed 9 days per month to avoid medication overuse.   - For acute treatment of moderate to severe headache, she may try eletriptan 40 mg tablet taken at onset of headache with repeat dose taken after 2 hours if needed. Use should not exceed 9 days per month to avoid medication overuse.   - If eletriptan is not effective, not tolerated, or not covered by her insurance, would consider a gepant like Nurtec or Ubrelvy.     Her current frequency and severity of headaches warrant prevention.  - Emgality has been very effective for significantly improving her headache frequency and severity. I recommend she continue with Emgality 120 mg subcutaneous injection every 28 days.   - Alternatively, could consider other CGRP inhibitors.       The longitudinal plan of care for the diagnosis(es)/condition(s) as documented were addressed during this visit. Due to the added complexity in care, I  will continue to support Mireya in the subsequent management and with ongoing continuity of care.     I will follow up with her in 1 year to monitor her progress.      Renea Salas PA-C  Headache Neurology  [facility]

## 2024-05-20 NOTE — LETTER
"    5/20/2024         RE: Mireya Fritz  [address] [address]  [facility] [address]        Dear Colleague,    Thank you for referring your patient, Mireya Fritz, to the [facility]. Please see a copy of my visit note below.    Virtual Visit Details    Type of service:  Video Visit     Originating Location (pt. Location): Home    Distant Location (provider location):  On-site  Platform used for Video Visit: [facility]    Headache Neurology Progress Note    May 20, 2024    Subjective:    Mireya \"Christiana\" presents for follow up of migraine without aura.     I last saw Christiana 3/27/2023. From initial headache encounter: \"She describes her headaches as a bilateral throbbing pain, located primarily at the temples.  She will have associated photophobia and phonophobia.  She is sensitive to smells at baseline.  She will be nauseous and can rarely vomit.  Her right eye can have blurred vision and she can see sparkles with severe headaches.\"  At baseline, she reported 14+/30 headache days per month with 5/30 severe headache days per month.     At last visit, I recommended a trial of eletriptan and Emgality.    She states that her headaches are much improved.   Christiana currently reports 1-2/30 headache days per month, with 1-2/30 severe headache days per month.     When she has a migraine, it will still last most of the day, but is now more like a 7/10 rather than 9/10, though overall can still be debilitating.     She will have breakthrough headaches when Emgality is wearing off (she has done a few injections late).     She is no longer taking topiramate.      Current headache treatments:  Acute therapies:  - Excedrin   - Eletriptan - has not tried yet     Preventative therapies:  - Emgality 120 mg - effective  - Sertraline 100 mg (depression, anxiety)  - Buspirone (depression, anxiety)     Supportive therapies:  - Massage  - Ice     Previous " treatments tried:  Acute therapies:  - Acetaminophen - not effective  - Ibuprofen - not effective  - Naproxen - not effective  - Sumatriptan 100 mg - not effective  - Prochlorperazine 10 mg - not effective  - Rizatriptan 10 mg - not effective     Preventative therapies:  - Buproprion 150 mg  - Venlaxafine 150 mg  - Topiramate 100 mg nightly - not effective  - Fluoxetine 40 mg   - Duloxetine 60 mg nightly     Supportive therapies:             3/27/2023     9:44 AM 5/20/2024     7:54 AM   HIT-6   When you have headaches, how often is the pain severe 13 11   How often do headaches limit your ability to do usual daily activities including household work, work, school, or social activities? 13 13   When you have a headache, how often do you wish you could lie down? 13 13   In the past 4 weeks, how often have you felt too tired to do work or daily activities because of your headaches 11 10   In the past 4 weeks, how often have you felt fed up or irritated because of your headaches 11 10   In the past 4 weeks, how often did headaches limit your ability to concentrate on work or daily activities 13 10   HIT-6 Total Score 74 67           3/27/2023     9:45 AM 5/20/2024     7:59 AM   MIDAS - in the past three months:   On how many days did you miss work or school because of your headaches? 4 2   How many days was your productivity at work or school reduced by half or more because of your headaches? 5 4   On how many days did you not do household work because of your headaches? 10 2   How many days was your productivity in household work reduced by half or more because of your headaches? 10 4   On how many days did you miss family, social, or leisure activities because of your headaches? 10 4   On how many days did you have a headache? 26 4   On a scale of 0-10, on average how painful were these headaches? 8 6   MIDAS Score 39 (IV - Severe Disability) 16 (III - Moderate Disability)        Objective:    Vitals: There were no  vitals taken for this visit.  General: Cooperative, NAD  Neurologic Exam:  Mental Status: Fully alert, attentive and oriented. Speech is clear and fluent.   Cranial Nerves: Facial movements symmetric.   Motor: No abnormal movements.        Assessment and Plan:   Mireya is a 23 year old female who presents for follow up of migraine without aura.     We discussed the following treatment strategy:  - For acute treatment of mild headache, she may use Excedrin as needed, not to exceed 9 days per month to avoid medication overuse.   - For acute treatment of moderate to severe headache, she may try eletriptan 40 mg tablet taken at onset of headache with repeat dose taken after 2 hours if needed. Use should not exceed 9 days per month to avoid medication overuse.   - If eletriptan is not effective, not tolerated, or not covered by her insurance, would consider a gepant like Nurtec or Ubrelvy.     Her current frequency and severity of headaches warrant prevention.  - Emgality has been very effective for significantly improving her headache frequency and severity. I recommend she continue with Emgality 120 mg subcutaneous injection every 28 days.   - Alternatively, could consider other CGRP inhibitors.       The longitudinal plan of care for the diagnosis(es)/condition(s) as documented were addressed during this visit. Due to the added complexity in care, I will continue to support Mireya in the subsequent management and with ongoing continuity of care.     I will follow up with her in 1 year to monitor her progress.      Mercedes Salas PA-C  Headache Neurology  [facility]      Again, thank you for allowing me to participate in the care of your patient.        Sincerely,        MERCEDES SALAS PA-C

## 2024-05-22 DIAGNOSIS — N94.6 DYSMENORRHEA: ICD-10-CM

## 2024-05-22 RX ORDER — NORETHINDRONE ACETATE AND ETHINYL ESTRADIOL AND FERROUS FUMARATE 1MG-20(21)
KIT ORAL
Qty: 84 TABLET | Refills: 0 | OUTPATIENT
Start: 2024-05-22

## 2024-05-23 ENCOUNTER — MYC REFILL (OUTPATIENT)
Dept: PEDIATRICS | Facility: CLINIC | Age: 24
End: 2024-05-23
Payer: COMMERCIAL

## 2024-05-23 DIAGNOSIS — N94.6 DYSMENORRHEA: ICD-10-CM

## 2024-05-23 RX ORDER — NORETHINDRONE ACETATE AND ETHINYL ESTRADIOL 1MG-20(21)
1 KIT ORAL DAILY
Qty: 84 TABLET | Refills: 0 | Status: SHIPPED | OUTPATIENT
Start: 2024-05-23 | End: 2024-07-30

## 2024-06-11 ENCOUNTER — VIRTUAL VISIT (OUTPATIENT)
Dept: PEDIATRICS | Facility: CLINIC | Age: 24
End: 2024-06-11
Payer: COMMERCIAL

## 2024-06-11 DIAGNOSIS — N39.0 ACUTE UTI (URINARY TRACT INFECTION): ICD-10-CM

## 2024-06-11 PROCEDURE — 99213 OFFICE O/P EST LOW 20 MIN: CPT | Mod: 95 | Performed by: PEDIATRICS

## 2024-06-11 NOTE — PROGRESS NOTES
Mireya is a 23 year old who is being evaluated via a billable video visit.    How would you like to obtain your AVS? MyChart  If the video visit is dropped, the invitation should be resent by: Send to e-mail at: [email or web address]  Will anyone else be joining your video visit? No      Assessment & Plan       ICD-10-CM    1. BMI 35.0-35.9,adult  Z68.35 Semaglutide-Weight Management (WEGOVY) 0.25 MG/0.5ML pen     semaglutide (OZEMPIC) 2 MG/3ML pen    Morbid obesity contributing to difficulties with back pain, joint issues.      Discussed treatment options today.  Has tried many commercially available programs without success.    Reviewed GLP-1 agonist use, risks/benefits.  Prescription sent to pharmacy.  Patient will alert me with side effects.    Follow up with me for physical in 3-4 months.  Patient to send me monthly updates prior to that time.                        Subjective   Mireya is a 23 year old, presenting for the following health issues:  No chief complaint on file.    HPI     Weights:  Wt Readings from Last 4 Encounters:   02/06/24 100.2 kg (221 lb)   12/20/23 103.9 kg (229 lb)   12/04/23 107.8 kg (237 lb 11.2 oz)   03/04/23 93.6 kg (206 lb 4.8 oz)       Long struggles with weight - has tried many methods to lose weight.  Very busy with work and that makes healthy lifestyle difficult.  Has done Noom, calorie counting, exercise, topamax.   Back pain related to carrying extra weight.   Despite all best efforts, weight continues to climb.    Got liposuction in December - recovery was painful, but doing well now.      Back injury at work - working through this - making working out more hard at this time          Objective           Vitals:  No vitals were obtained today due to virtual visit.    Physical Exam   GENERAL: alert and no distress  EYES: Eyes grossly normal to inspection.  No discharge or erythema, or obvious scleral/conjunctival abnormalities.  RESP: No audible wheeze, cough, or  visible cyanosis.    SKIN: Visible skin clear. No significant rash, abnormal pigmentation or lesions.  NEURO: Cranial nerves grossly intact.  Mentation and speech appropriate for age.  PSYCH: Appropriate affect, tone, and pace of words          Video-Visit Details    Type of service:  Video Visit   Originating Location (pt. Location): Home    Distant Location (provider location):  On-site  Platform used for Video Visit: [employer]  Signed Electronically by: Lydia Rodriguez MD

## 2024-06-13 ENCOUNTER — TELEPHONE (OUTPATIENT)
Dept: PEDIATRICS | Facility: CLINIC | Age: 24
End: 2024-06-13
Payer: COMMERCIAL

## 2024-06-13 NOTE — TELEPHONE ENCOUNTER
Prior Authorization Retail Medication Request    Medication/Dose: Ozempic 0.25mg   Diagnosis and ICD code (if different than what is on RX):  BMI 35.0-35.9,adult [Z68.35]   New/renewal/insurance change PA/secondary ins. PA:  Previously Tried and Failed:    Rationale:      Insurance   Primary: [employer]  Insurance ID:  [id number]    Secondary (if applicable):  Insurance ID:      Pharmacy Information (if different than what is on RX)  Name:  [facility]  Phone:  [phone]  Fax:[phone]    Sandy WHITEHEAD CMA

## 2024-06-16 ENCOUNTER — HEALTH MAINTENANCE LETTER (OUTPATIENT)
Age: 24
End: 2024-06-16

## 2024-06-20 NOTE — TELEPHONE ENCOUNTER
PRIOR AUTHORIZATION DENIED    Medication: Ozempic 0.25mg     Denial Date: 6/20/2024    Denial Rational: Medication is only covered if being prescribed for Type 2 Diabetes.  It is not covered for any other diagnosis.    Patient currently has an approval on file for Wegovy  In order for her Wegovy coverage to continue she will need to follow the following taper schedule.

## 2024-07-01 ENCOUNTER — TRANSFERRED RECORDS (OUTPATIENT)
Dept: MULTI SPECIALTY CLINIC | Facility: CLINIC | Age: 24
End: 2024-07-01

## 2024-07-01 LAB — CHLAMYDIA - HIM PATIENT REPORTED: NORMAL

## 2024-07-02 ENCOUNTER — OFFICE VISIT (OUTPATIENT)
Dept: PEDIATRICS | Facility: CLINIC | Age: 24
End: 2024-07-02
Payer: COMMERCIAL

## 2024-07-02 ENCOUNTER — NURSE TRIAGE (OUTPATIENT)
Dept: PEDIATRICS | Facility: CLINIC | Age: 24
End: 2024-07-02

## 2024-07-02 VITALS
WEIGHT: 223.8 LBS | BODY MASS INDEX: 33.92 KG/M2 | OXYGEN SATURATION: 98 % | HEIGHT: 68 IN | DIASTOLIC BLOOD PRESSURE: 86 MMHG | TEMPERATURE: 97.7 F | RESPIRATION RATE: 20 BRPM | HEART RATE: 94 BPM | SYSTOLIC BLOOD PRESSURE: 124 MMHG

## 2024-07-02 DIAGNOSIS — R19.7 DIARRHEA, UNSPECIFIED TYPE: ICD-10-CM

## 2024-07-02 DIAGNOSIS — R11.0 NAUSEA: ICD-10-CM

## 2024-07-02 LAB
ALBUMIN SERPL BCG-MCNC: 4.3 G/DL (ref 3.5–5.2)
ALP SERPL-CCNC: 77 U/L (ref 40–150)
ALT SERPL W P-5'-P-CCNC: 22 U/L (ref 0–50)
ANION GAP SERPL CALCULATED.3IONS-SCNC: 13 MMOL/L (ref 7–15)
AST SERPL W P-5'-P-CCNC: 29 U/L (ref 0–45)
BILIRUB SERPL-MCNC: 0.5 MG/DL
BUN SERPL-MCNC: 14.9 MG/DL (ref 6–20)
CALCIUM SERPL-MCNC: 8.9 MG/DL (ref 8.6–10)
CHLORIDE SERPL-SCNC: 103 MMOL/L (ref 98–107)
CREAT SERPL-MCNC: 0.87 MG/DL (ref 0.51–0.95)
DEPRECATED HCO3 PLAS-SCNC: 20 MMOL/L (ref 22–29)
EGFRCR SERPLBLD CKD-EPI 2021: >90 ML/MIN/1.73M2
ERYTHROCYTE [DISTWIDTH] IN BLOOD BY AUTOMATED COUNT: 12 % (ref 10–15)
GLUCOSE SERPL-MCNC: 102 MG/DL (ref 70–99)
HCT VFR BLD AUTO: 43.4 % (ref 35–47)
HGB BLD-MCNC: 14.7 G/DL (ref 11.7–15.7)
LIPASE SERPL-CCNC: 25 U/L (ref 13–60)
MCH RBC QN AUTO: 29.3 PG (ref 26.5–33)
MCHC RBC AUTO-ENTMCNC: 33.9 G/DL (ref 31.5–36.5)
MCV RBC AUTO: 87 FL (ref 78–100)
PLATELET # BLD AUTO: 199 10E3/UL (ref 150–450)
POTASSIUM SERPL-SCNC: 3.6 MMOL/L (ref 3.4–5.3)
PROT SERPL-MCNC: 7.3 G/DL (ref 6.4–8.3)
RBC # BLD AUTO: 5.01 10E6/UL (ref 3.8–5.2)
SODIUM SERPL-SCNC: 136 MMOL/L (ref 135–145)
WBC # BLD AUTO: 8.7 10E3/UL (ref 4–11)

## 2024-07-02 PROCEDURE — 99214 OFFICE O/P EST MOD 30 MIN: CPT | Mod: 25 | Performed by: PHYSICIAN ASSISTANT

## 2024-07-02 PROCEDURE — 85027 COMPLETE CBC AUTOMATED: CPT | Performed by: PHYSICIAN ASSISTANT

## 2024-07-02 PROCEDURE — 80053 COMPREHEN METABOLIC PANEL: CPT | Performed by: PHYSICIAN ASSISTANT

## 2024-07-02 PROCEDURE — 36415 COLL VENOUS BLD VENIPUNCTURE: CPT | Performed by: PHYSICIAN ASSISTANT

## 2024-07-02 PROCEDURE — 96361 HYDRATE IV INFUSION ADD-ON: CPT | Performed by: PHYSICIAN ASSISTANT

## 2024-07-02 PROCEDURE — 96374 THER/PROPH/DIAG INJ IV PUSH: CPT | Performed by: PHYSICIAN ASSISTANT

## 2024-07-02 PROCEDURE — 83690 ASSAY OF LIPASE: CPT | Performed by: PHYSICIAN ASSISTANT

## 2024-07-02 RX ORDER — ONDANSETRON 4 MG/1
4 TABLET, FILM COATED ORAL EVERY 8 HOURS PRN
Qty: 12 TABLET | Refills: 0 | Status: SHIPPED | OUTPATIENT
Start: 2024-07-02

## 2024-07-02 RX ORDER — ONDANSETRON 2 MG/ML
4 INJECTION INTRAMUSCULAR; INTRAVENOUS ONCE
Status: COMPLETED | OUTPATIENT
Start: 2024-07-02 | End: 2024-07-02

## 2024-07-02 RX ADMIN — Medication 1000 ML: at 13:59

## 2024-07-02 RX ADMIN — ONDANSETRON 4 MG: 2 INJECTION INTRAMUSCULAR; INTRAVENOUS at 13:58
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
## 2024-07-02 NOTE — PATIENT INSTRUCTIONS
Take Zofran as needed for nausea.   Small amounts of clear fluids such as Pedialyte, Gatorade, water. May suck on ice chips as well.  Limit dairy products for 5-7 days.  [place] diet such as bananas, rice, applesauce, toast as tolerated.  May use Imodium for diarrhea if necessary but recommend limiting its use.  Follow up promptly if signs of dehydration occur (dry mouth/eyes, decreased urination, lightheaded or pass out), signs of bleeding (dark black stool or blood in stool), or you can't keep anything down.  Follow up if not improving in 2-3 days or if other concerns.    Gastroenteritis  Gastroenteritis is commonly called the stomach flu. It is most often caused by a virus that affects the stomach and intestinal tract and usually lasts from 2 to 7 days. Common viruses causing gastroenteritis include norovirus, rotavirus, and hepatitis A. Non-viral causes of gastroenteritis include bacteria, parasites, and toxins.  The danger from repeated vomiting or diarrhea is dehydration. This is the loss of too much fluid from the body. When this occurs, body fluids must be replaced. Antibiotics do not help with this illness because it is usually viral.Simple home treatment will be helpful.  Symptoms of viral gastroenteritis may include:  Watery, loose stools  Stomach pain or abdominal cramps  Fever and chills  Nausea and vomiting  Loss of bowel control  Headache  Home care  Gastroenteritis is transmitted by contact with the stool or vomit of an infected person. This can occur from person to person or from contact with a contaminated surface.  Follow these guidelines when caring for yourself at home:  If symptoms are severe, rest at home for the next 24 hours or until you are feeling better.  Wash your hands with soap and water or use alcohol-based  to prevent the spread of infection. Wash your hands after touching anyone who is sick.  Wash your hands or use alcohol-based  after using the toilet and before  meals. Clean the toilet after each use.  Remember these tips when preparing food:  People with diarrhea should not prepare or serve food to others. When preparing foods, wash your hands before and after.  Wash your hands after using cutting boards, countertops, knives, or utensils that have been in contact with raw food.  Keep uncooked meats away from cooked and ready-to-eat foods.  Diet  Follow these guidelines for food:  Water and liquids are important so you don't get dehydrated. Drink a small amount at a time or suck on ice chips if you are vomiting.  If you eat, avoid fatty, greasy, spicy, or fried foods.  Don't eat dairy if you have diarrhea. This can make diarrhea worse.  Avoid tobacco, alcohol, and caffeine which may worsen symptoms.  During the first 24 hours (the first full day), follow the diet below:  Beverages. Sports drinks, soft drinks without caffeine, ginger ale, mineral water (plain or flavored), decaffeinated tea and coffee. If you are very dehydrated, sports drinks aren't a good choice. They have too much sugar and not enough electrolytes. In this case, commercially available products called oral rehydration solutions, are best.  Soups. Eat clear broth, consommé, and bouillon.  During the next 24 hours (the second day), you may add the following to the above:  Hot cereal, plain toast, bread, rolls, and crackers  Plain noodles, rice, mashed potatoes, chicken noodle or rice soup  Unsweetened canned fruit (avoid pineapple), bananas  Limit fat intake to less than 15 grams per day. Do this by avoiding margarine, butter, oils, mayonnaise, sauces, gravies, fried foods, peanut butter, meat, poultry, and fish.  Limit fiber and avoid raw or cooked vegetables, fresh fruits (except bananas), and bran cereals.  Limit caffeine and chocolate. Don't use spices or seasonings other than salt.  Limit dairy products.  Avoid alcohol.  During the next 24 hours:  Gradually resume a normal diet as you feel better and  your symptoms improve.  If at any time it starts getting worse again, go back to clear liquids until you feel better.  Follow-up care  Follow up with your healthcare provider, or as advised. Call your provider if you don't get better within 24 hours or if diarrhea lasts more than a week. Also follow up if you are unable to keep down liquids and get dehydrated. If a stool (diarrhea) sample was taken, call as directed for the results.  Date Last Reviewed: 1/3/2016

## 2024-07-02 NOTE — PROGRESS NOTES
"Acute and Diagnostic Services Clinic Visit    {PROVIDER CHARTING PREFERENCE:203563}    Ahsan Gomes is a 23 year old, presenting for the following health issues:  Diarrhea (Patient is here with diarrhea x 5 days.)  {(!) Visit Details have not yet been documented.  Please enter Visit Details and then use this list to pull in documentation. (Optional):702674}  HPI     Diarrhea  Onset/Duration: 6/27/2024  Description:       Consistency of stool: watery, yellow, and explosive       Blood in stool: No       Number of loose stools past 24 hours: over 10  Progression of Symptoms: worsening  Accompanying signs and symptoms:       Fever: No       Nausea/Vomiting: YES       Abdominal pain: YES       Weight loss: No       Episodes of constipation: No  History   Ill contacts: No  Recent use of antibiotics: No  Recent travels: YES- returned from [place] on 6/26/2024   Recent medication-new or changes(Rx or OTC): No  Precipitating or alleviating factors: None  Therapies tried and outcome: lomotil, Imodium AD, PeptoBismol, and they do not work.      {ROS Picklists (Optional):387539}      Objective    /86 (BP Location: Right arm, Patient Position: Sitting, Cuff Size: Adult Large)   Pulse (!) 133   Temp 97.7  F (36.5  C) (Temporal)   Resp 20   Ht 1.715 m (5' 7.5\")   Wt 101.5 kg (223 lb 12.8 oz)   LMP  (LMP Unknown)   SpO2 94%   BMI 34.53 kg/m    Body mass index is 34.53 kg/m .  Physical Exam   {Exam List (Optional):946252}    {Diagnostic Test Results (Optional):647877}        Signed Electronically by: Keily Enriquez PA-C  {Email feedback regarding this note to [email or web address]   :036791}  "

## 2024-07-02 NOTE — TELEPHONE ENCOUNTER
Nurse Triage SBAR    Is this a 2nd Level Triage? YES, LICENSED PRACTITIONER REVIEW IS REQUIRED    Situation: Pt calls to report severe diarrhea.     Background: Pt reports recently returning from [place] on Wednesday. Pt states she was in [place] for one week. She reports that herself and other individuals that went to [place] have developed diarrhea. Pt reports her diarrhea began on Thursday. Pt reports she was given abx in [place] for sinus infection. Pt reports taking Imodium with no relief.     Assessment: Pt reports diarrhea began Thursday and is worsening. Pt reports she lost track of how many loose stools she passed in the last 24 hours, but notes it is more than 10. Pt states she cannot keep water down, and reports she began vomiting today x 1. Pt reports nausea continues. Pt endorses feeling lightheaded like she may pass out. Pt reports her mouth/lips are dry. Pt notes she is urinating but unsure of color/frequency d/t frequent stools. Pt reports generalized upper abdominal pain rated 7/10. Pt denies blood in emesis or stool. Pt denies fever or chance of pregnancy.      Protocol Recommended Disposition:   Call [facility]/Go to ED/UCC Now (Or To Office with PCP Approval)    Recommendation: Called [facility], full today. Called [facility], and relayed above info to provider Keily. [facility] able to accept pt and will call patient to schedule. Called Tiff[facility] back and confirmed pt is able to be seen today. [facility] staff [place] confirms they were able to contact pt and she is scheduled for 1:30 PM today.    Reason for Disposition   Constant abdominal pain lasting > 2 hours    Additional Information   Negative: Shock suspected (e.g., cold/pale/clammy skin, too weak to stand, low BP, rapid pulse)   Negative: Difficult to awaken or acting confused (e.g., disoriented, slurred speech)   Negative: Sounds like a life-threatening emergency to the triager   Negative: Vomiting also present and worse than the diarrhea   Negative: Blood  in stool and without diarrhea   Negative: SEVERE abdominal pain (e.g., excruciating) and present > 1 hour   Negative: SEVERE abdominal pain and age > 60 years   Negative: Bloody, black, or tarry bowel movements  (Exception: Chronic-unchanged black-grey bowel movements and is taking iron pills or Pepto-Bismol.)    Protocols used: Diarrhea-A-OH    Alex Gonzalez RN on 7/2/2024 at 11:54 AM

## 2024-07-05 ENCOUNTER — NURSE TRIAGE (OUTPATIENT)
Dept: PEDIATRICS | Facility: CLINIC | Age: 24
End: 2024-07-05

## 2024-07-05 ENCOUNTER — OFFICE VISIT (OUTPATIENT)
Dept: PEDIATRICS | Facility: CLINIC | Age: 24
End: 2024-07-05
Payer: COMMERCIAL

## 2024-07-05 VITALS
BODY MASS INDEX: 34.67 KG/M2 | HEART RATE: 77 BPM | WEIGHT: 220.9 LBS | TEMPERATURE: 98.7 F | RESPIRATION RATE: 16 BRPM | HEIGHT: 67 IN | DIASTOLIC BLOOD PRESSURE: 88 MMHG | SYSTOLIC BLOOD PRESSURE: 127 MMHG | OXYGEN SATURATION: 98 %

## 2024-07-05 DIAGNOSIS — Z78.9 HISTORY OF RECENT TRAVEL: ICD-10-CM

## 2024-07-05 DIAGNOSIS — R19.7 DIARRHEA, UNSPECIFIED TYPE: Primary | ICD-10-CM

## 2024-07-05 PROCEDURE — 99213 OFFICE O/P EST LOW 20 MIN: CPT | Performed by: PHYSICIAN ASSISTANT

## 2024-07-05 ASSESSMENT — PATIENT HEALTH QUESTIONNAIRE - PHQ9
10. IF YOU CHECKED OFF ANY PROBLEMS, HOW DIFFICULT HAVE THESE PROBLEMS MADE IT FOR YOU TO DO YOUR WORK, TAKE CARE OF THINGS AT HOME, OR GET ALONG WITH OTHER PEOPLE: SOMEWHAT DIFFICULT
SUM OF ALL RESPONSES TO PHQ QUESTIONS 1-9: 10
SUM OF ALL RESPONSES TO PHQ QUESTIONS 1-9: 10

## 2024-07-05 ASSESSMENT — ENCOUNTER SYMPTOMS: DIARRHEA: 1

## 2024-07-05 ASSESSMENT — PAIN SCALES - GENERAL: PAINLEVEL: MODERATE PAIN (4)

## 2024-07-05 NOTE — PROGRESS NOTES
"  Assessment & Plan     Diarrhea, unspecified type    - Enteric Bacteria and Virus Panel by [facility] Stool; Future  - C. difficile Toxin B PCR with reflex to C. difficile Antigen and Toxins A/B EIA; Future  - Cryptosporidium/Giardia Immunoassay; Future  - Enteric Bacteria and Virus Panel by [facility]ON[facility] Stool  - C. difficile Toxin B PCR with reflex to C. difficile Antigen and Toxins A/B EIA  - Cryptosporidium/Giardia Immunoassay    History of recent travel    - Enteric Bacteria and Virus Panel by [facility]ON[facility] Stool; Future  - C. difficile Toxin B PCR with reflex to C. difficile Antigen and Toxins A/B EIA; Future  - Cryptosporidium/Giardia Immunoassay; Future  - Enteric Bacteria and Virus Panel by [facility]ON[facility] Stool  - C. difficile Toxin B PCR with reflex to C. difficile Antigen and Toxins A/B EIA  - Cryptosporidium/Giardia Immunoassay      Patient is here today for concerns of continued diarrhea after a recent trip to [place].  She was getting better, but the loose stools returned last night/this am.  On exam she is well appearing with normal vitals and is able to take in fluids and bland foods.  She was seen a couple of days ago and was asked to collect stool samples, but she did not do this.  She was advised today to have these done and turn them in as soon as able to evaluate if there is an organism contributing to her symptoms that can be treated by antibiotic.  Patient was also advised that If her symptoms worsen and she cannot keep down fluids and bland food or is feeling dehydrated, she is to go to the ED.  Patient understands and agrees with the plan today.        BMI  Estimated body mass index is 34.6 kg/m  as calculated from the following:    Height as of this encounter: 1.702 m (5' 7\").    Weight as of this encounter: 100.2 kg (220 lb 14.4 oz).       Ahsan Gomes is a 23 year old, presenting for the following health issues:  Diarrhea (X9 days returned from [place]. Feeling Fatigue, stomach pain, nausea and diarrhea )        " "7/5/2024     8:08 AM   Additional Questions   Roomed by GLENNA Waters   Accompanied by n/a         7/5/2024     8:08 AM   Patient Reported Additional Medications   Patient reports taking the following new medications n/a     Diarrhea    History of Present Illness       Reason for visit:  Recently returned from [place]  Symptom onset:  1-2 weeks ago  Symptoms include:  Diarrhea, Nausea, fatigue and stomach pain  Symptom intensity:  Moderate  Symptom progression:  Worsening  Had these symptoms before:  No    She eats 0-1 servings of fruits and vegetables daily.She consumes 0 sweetened beverage(s) daily.She exercises with enough effort to increase her heart rate 9 or less minutes per day.  She exercises with enough effort to increase her heart rate 3 or less days per week.   She is taking medications regularly.       PATIENT IS HERE FOR CONCERNS OF DIARRHEA.  She was seen on July 2nd, but never did the stool collectin she was given.  She is not having fevers.  She is able to eat and drink, but she is feeling full, bloated and hungry all at the same time and does not have much of an appetite.  She reports that she thought she was getting better after her July 2nd visit, but she got worse again this morning and is having loose BMs again.  She is having some abdominal pain with the symptoms that she puts as a 4-5/10.  She is drinking water and Gatorade and feeling like she is keeping that down.    Of note, she recently traveled to [place].       Patient also states that her lip is feeling swollen.  She is not having a hard time breathing and she denies any difficulty swallowing or chewing.        Review of Systems  Constitutional, neuro, ENT, endocrine, pulmonary, cardiac, gastrointestinal, genitourinary, musculoskeletal, integument and psychiatric systems are negative, except as otherwise noted.      Objective    /88   Pulse 77   Temp 98.7  F (37.1  C) (Oral)   Resp 16   Ht 1.702 m (5' 7\")   Wt 100.2 kg (220 lb 14.4 " oz)   LMP  (LMP Unknown)   SpO2 98%   Breastfeeding No   BMI 34.60 kg/m    Body mass index is 34.6 kg/m .  Physical Exam   GENERAL: alert and no distress  EYES: Eyes grossly normal to inspection, PERRL and conjunctivae and sclerae normal  HENT: normal cephalic/atraumatic, nose and mouth without ulcers or lesions, Lips appear normal bilaterally and no edema appreciated.  Oropharynx clear, and oral mucous membranes moist  NECK: no adenopathy, no asymmetry, masses, or scars  RESP: lungs clear to auscultation - no rales, rhonchi or wheezes  CV: regular rate and rhythm, normal S1 S2, no S3 or S4, no murmur, click or rub, no peripheral edema  ABDOMEN: tenderness diffusely throughout the abdomen, abdomen is otherwise soft and bowel sounds normal  MS: no gross musculoskeletal defects noted, no edema    Labs - pending        Signed Electronically by: Lucille Pino PA-C

## 2024-07-05 NOTE — TELEPHONE ENCOUNTER
S-(situation): diarrhea    B-(background): ongoing for 9 days. Patient was seen in [facility] 7/2 for diarrhea, lightheadedness, dehydration. Recent travel to [place].    A-(assessment): Patient stated she was feeling better 7/4, but last night continued to have diarrhea and again this AM. Patient states she is having to have a BM every 15-20 minutes. Diarrhea is watery, brown/yellow/clear. Endorses nausea but no vomiting. States she has a constant feeling of hunger and bloating in her upper abdomen, denies pain. States she is able to consistently take in oral liquids.     Denies signs of dehydration (lightheadedness, dry mouth, dizziness), vomiting , fever, blood in stool.    R-(recommendations): Per protocol, patient scheduled today with Lucille Pino PA-C at 7/5 at 8:30 with 8:10 arrival time. Reviewed care advice under care tab with pt. Instructed pt to call back if new or worsening symptoms. Patient was given an opportunity to ask questions, verbalized understanding of plan, and is agreeable.    Carolina CHAVEZ RN on 7/5/2024 at 7:34 AM           Reason for Disposition   SEVERE diarrhea (e.g., 7 or more times / day more than normal) and present > 24 hours (1 day)    Additional Information   Negative: Shock suspected (e.g., cold/pale/clammy skin, too weak to stand, low BP, rapid pulse)   Negative: Difficult to awaken or acting confused (e.g., disoriented, slurred speech)   Negative: Sounds like a life-threatening emergency to the triager   Negative: Vomiting also present and worse than the diarrhea   Negative: Blood in stool and without diarrhea   Negative: SEVERE abdominal pain (e.g., excruciating) and present > 1 hour   Negative: SEVERE abdominal pain and age > 60 years   Negative: Bloody, black, or tarry bowel movements  (Exception: Chronic-unchanged black-grey bowel movements and is taking iron pills or Pepto-Bismol.)   Negative: SEVERE diarrhea (e.g., 7 or more times / day more than normal) and age > 60 years    "Negative: Constant abdominal pain lasting > 2 hours   Negative: Drinking very little and dehydration suspected (e.g., no urine > 12 hours, very dry mouth, very lightheaded)   Negative: Patient sounds very sick or weak to the triager    Answer Assessment - Initial Assessment Questions  1. DIARRHEA SEVERITY: \"How bad is the diarrhea?\" \"How many more stools have you had in the past 24 hours than normal?\"     - NO DIARRHEA (SCALE 0)    - MILD (SCALE 1-3): Few loose or mushy BMs; increase of 1-3 stools over normal daily number of stools; mild increase in ostomy output.    -  MODERATE (SCALE 4-7): Increase of 4-6 stools daily over normal; moderate increase in ostomy output.    -  SEVERE (SCALE 8-10; OR \"WORST POSSIBLE\"): Increase of 7 or more stools daily over normal; moderate increase in ostomy output; incontinence.      2 more accident over night. None yesterday. Increased in diarrhea this AM  Possibly constipation after ADS  2. ONSET: \"When did the diarrhea begin?\"       9 days   3. BM CONSISTENCY: \"How loose or watery is the diarrhea?\"       Watery, brown yellow and clear   4. VOMITING: \"Are you also vomiting?\" If Yes, ask: \"How many times in the past 24 hours?\"       Nausea, close to throwing up.  5. ABDOMEN PAIN: \"Are you having any abdomen pain?\" If Yes, ask: \"What does it feel like?\" (e.g., crampy, dull, intermittent, constant)        Upper abdomen between rib cages. Above belly button \"hungry/bloated\" feeling  6. ABDOMEN PAIN SEVERITY: If present, ask: \"How bad is the pain?\"  (e.g., Scale 1-10; mild, moderate, or severe)    - MILD (1-3): doesn't interfere with normal activities, abdomen soft and not tender to touch     - MODERATE (4-7): interferes with normal activities or awakens from sleep, abdomen tender to touch     - SEVERE (8-10): excruciating pain, doubled over, unable to do any normal activities        N/a  7. ORAL INTAKE: If vomiting, \"Have you been able to drink liquids?\" \"How much liquids have you had " "in the past 24 hours?\"      Okay, able to take in  8. HYDRATION: \"Any signs of dehydration?\" (e.g., dry mouth [not just dry lips], too weak to stand, dizziness, new weight loss) \"When did you last urinate?\"      Denies lightheadedness  9. EXPOSURE: \"Have you traveled to a foreign country recently?\" \"Have you been exposed to anyone with diarrhea?\" \"Could you have eaten any food that was spoiled?\"      Yes [place]  10. ANTIBIOTIC USE: \"Are you taking antibiotics now or have you taken antibiotics in the past 2 months?\"        no  11. OTHER SYMPTOMS: \"Do you have any other symptoms?\" (e.g., fever, blood in stool)        no    Protocols used: Diarrhea-A-OH    "

## 2024-07-24 ENCOUNTER — E-VISIT (OUTPATIENT)
Dept: PEDIATRICS | Facility: CLINIC | Age: 24
End: 2024-07-24
Payer: COMMERCIAL

## 2024-07-24 ENCOUNTER — MYC MEDICAL ADVICE (OUTPATIENT)
Dept: PEDIATRICS | Facility: CLINIC | Age: 24
End: 2024-07-24

## 2024-07-24 DIAGNOSIS — N39.0 ACUTE UTI (URINARY TRACT INFECTION): Primary | ICD-10-CM

## 2024-07-24 PROCEDURE — 99421 OL DIG E/M SVC 5-10 MIN: CPT | Performed by: PEDIATRICS

## 2024-07-24 RX ORDER — NITROFURANTOIN 25; 75 MG/1; MG/1
100 CAPSULE ORAL 2 TIMES DAILY
Qty: 10 CAPSULE | Refills: 0 | Status: SHIPPED | OUTPATIENT
Start: 2024-07-24 | End: 2024-07-29

## 2024-07-24 NOTE — PATIENT INSTRUCTIONS
I'm so sorry that you are dealing with this!     Your symptoms do sound typical for a bladder infection.  Ideally, we'd get a urine sample first - if that is doable for you today, please schedule a lab visit.  I have lab orders waiting for you.        If it isn't doable to make it to lab today, I totally understand and will treat you anyway - I just sent a prescription for antibiotics to your pharmacy.       If your symptoms worsen, you develop pain in your back or stomach, develop fevers, or are not improving in 5 days, please contact your primary care provider for an appointment or visit a Walk-in or Urgent Care Center to be seen.     Keep pushing fluids and hang in there!     Lydia Rodriguez MD

## 2024-07-25 RX ORDER — SULFAMETHOXAZOLE/TRIMETHOPRIM 800-160 MG
1 TABLET ORAL 2 TIMES DAILY
Qty: 6 TABLET | Refills: 0 | Status: SHIPPED | OUTPATIENT
Start: 2024-07-25 | End: 2024-07-28

## 2024-07-26 ENCOUNTER — TELEPHONE (OUTPATIENT)
Dept: PEDIATRICS | Facility: CLINIC | Age: 24
End: 2024-07-26
Payer: COMMERCIAL

## 2024-07-26 LAB
ALBUMIN UR-MCNC: NEGATIVE MG/DL
APPEARANCE UR: CLEAR
BILIRUB UR QL STRIP: ABNORMAL
COLOR UR AUTO: YELLOW
GLUCOSE UR STRIP-MCNC: NEGATIVE MG/DL
HGB UR QL STRIP: NEGATIVE
KETONES UR STRIP-MCNC: 40 MG/DL
LEUKOCYTE ESTERASE UR QL STRIP: NEGATIVE
NITRATE UR QL: NEGATIVE
PH UR STRIP: 6 [PH] (ref 5–7)
SP GR UR STRIP: 1.02 (ref 1–1.03)
UROBILINOGEN UR STRIP-ACNC: 0.2 E.U./DL

## 2024-07-26 PROCEDURE — 81003 URINALYSIS AUTO W/O SCOPE: CPT | Performed by: PEDIATRICS

## 2024-07-26 NOTE — TELEPHONE ENCOUNTER
Patient calls to inquire if orders for stool samples are still active. Pt was informed orders are still active. She states her sister recently tested positive for a parasite in her stool and patient would like to have testing completed.      Veronica Rouse RN on 7/26/2024 at 12:04 PM

## 2024-07-30 DIAGNOSIS — N94.6 DYSMENORRHEA: ICD-10-CM

## 2024-07-30 RX ORDER — NORETHINDRONE ACETATE AND ETHINYL ESTRADIOL 1MG-20(21)
1 KIT ORAL DAILY
Qty: 84 TABLET | Refills: 0 | Status: SHIPPED | OUTPATIENT
Start: 2024-07-30 | End: 2024-10-01

## 2024-08-01 NOTE — TELEPHONE ENCOUNTER
Called Mireya to schedule follow up- she said she had appointment with her gynecologist today 8/1 and does not need to further follow up.    Closing encounter.      Eva MARSH, - [facility]  Primary Care- Kevin Trejo [facility]

## 2024-09-09 ENCOUNTER — OFFICE VISIT (OUTPATIENT)
Dept: URGENT CARE | Facility: URGENT CARE | Age: 24
End: 2024-09-09
Payer: COMMERCIAL

## 2024-09-09 VITALS
WEIGHT: 220 LBS | HEART RATE: 87 BPM | SYSTOLIC BLOOD PRESSURE: 124 MMHG | RESPIRATION RATE: 14 BRPM | OXYGEN SATURATION: 98 % | DIASTOLIC BLOOD PRESSURE: 72 MMHG | TEMPERATURE: 98.8 F | BODY MASS INDEX: 34.46 KG/M2

## 2024-09-09 DIAGNOSIS — B37.31 YEAST INFECTION OF THE VAGINA: ICD-10-CM

## 2024-09-09 DIAGNOSIS — R39.89 URINARY PROBLEM: Primary | ICD-10-CM

## 2024-09-09 DIAGNOSIS — N89.8 VAGINAL DISCHARGE: ICD-10-CM

## 2024-09-09 LAB
ALBUMIN UR-MCNC: NEGATIVE MG/DL
APPEARANCE UR: CLEAR
BACTERIA #/AREA URNS HPF: ABNORMAL /HPF
BASOPHILS # BLD AUTO: 0.1 10E3/UL (ref 0–0.2)
BASOPHILS NFR BLD AUTO: 1 %
BILIRUB UR QL STRIP: NEGATIVE
CLUE CELLS: ABNORMAL
COLOR UR AUTO: YELLOW
EOSINOPHIL # BLD AUTO: 0.5 10E3/UL (ref 0–0.7)
EOSINOPHIL NFR BLD AUTO: 6 %
ERYTHROCYTE [DISTWIDTH] IN BLOOD BY AUTOMATED COUNT: 11.7 % (ref 10–15)
GLUCOSE UR STRIP-MCNC: NEGATIVE MG/DL
HCT VFR BLD AUTO: 41.4 % (ref 35–47)
HGB BLD-MCNC: 14.2 G/DL (ref 11.7–15.7)
HGB UR QL STRIP: ABNORMAL
IMM GRANULOCYTES # BLD: 0 10E3/UL
IMM GRANULOCYTES NFR BLD: 0 %
KETONES UR STRIP-MCNC: NEGATIVE MG/DL
LEUKOCYTE ESTERASE UR QL STRIP: ABNORMAL
LYMPHOCYTES # BLD AUTO: 3.7 10E3/UL (ref 0.8–5.3)
LYMPHOCYTES NFR BLD AUTO: 46 %
MCH RBC QN AUTO: 29.4 PG (ref 26.5–33)
MCHC RBC AUTO-ENTMCNC: 34.3 G/DL (ref 31.5–36.5)
MCV RBC AUTO: 86 FL (ref 78–100)
MONOCYTES # BLD AUTO: 0.5 10E3/UL (ref 0–1.3)
MONOCYTES NFR BLD AUTO: 7 %
NEUTROPHILS # BLD AUTO: 3.4 10E3/UL (ref 1.6–8.3)
NEUTROPHILS NFR BLD AUTO: 41 %
NITRATE UR QL: NEGATIVE
PH UR STRIP: 5.5 [PH] (ref 5–7)
PLATELET # BLD AUTO: 247 10E3/UL (ref 150–450)
RBC # BLD AUTO: 4.83 10E6/UL (ref 3.8–5.2)
RBC #/AREA URNS AUTO: ABNORMAL /HPF
SP GR UR STRIP: 1.01 (ref 1–1.03)
SQUAMOUS #/AREA URNS AUTO: ABNORMAL /LPF
TRICHOMONAS, WET PREP: ABNORMAL
UROBILINOGEN UR STRIP-ACNC: 0.2 E.U./DL
WBC # BLD AUTO: 8.2 10E3/UL (ref 4–11)
WBC #/AREA URNS AUTO: ABNORMAL /HPF
WBC'S/HIGH POWER FIELD, WET PREP: ABNORMAL
YEAST, WET PREP: PRESENT

## 2024-09-09 PROCEDURE — 87086 URINE CULTURE/COLONY COUNT: CPT | Performed by: FAMILY MEDICINE

## 2024-09-09 PROCEDURE — 81001 URINALYSIS AUTO W/SCOPE: CPT | Performed by: FAMILY MEDICINE

## 2024-09-09 PROCEDURE — 87088 URINE BACTERIA CULTURE: CPT | Performed by: FAMILY MEDICINE

## 2024-09-09 PROCEDURE — 99213 OFFICE O/P EST LOW 20 MIN: CPT | Performed by: FAMILY MEDICINE

## 2024-09-09 PROCEDURE — 36415 COLL VENOUS BLD VENIPUNCTURE: CPT | Performed by: FAMILY MEDICINE

## 2024-09-09 PROCEDURE — 85025 COMPLETE CBC W/AUTO DIFF WBC: CPT | Performed by: FAMILY MEDICINE

## 2024-09-09 PROCEDURE — 87210 SMEAR WET MOUNT SALINE/INK: CPT | Performed by: FAMILY MEDICINE

## 2024-09-09 RX ORDER — FLUCONAZOLE 150 MG/1
150 TABLET ORAL DAILY
Qty: 3 TABLET | Refills: 0 | Status: SHIPPED | OUTPATIENT
Start: 2024-09-09 | End: 2024-09-12

## 2024-09-09 NOTE — PROGRESS NOTES
Chief Complaint   Patient presents with    Vaginal Problem     Possible UTI or yeast infection,  back pain, abdomen pain, frequency   x yesterday       Mireya was seen today for vaginal problem.    Diagnoses and all orders for this visit:    Urinary problem  -     UA Macroscopic with reflex to Microscopic and Culture - Lab Collect; Future  -     Wet prep - lab collect; Future  -     UA Macroscopic with reflex to Microscopic and Culture - Lab Collect  -     Wet prep - lab collect  -     UA Microscopic with Reflex to Culture  -     Urine Culture Aerobic Bacterial - lab collect; Future  -     CBC with platelets and differential; Future  -     Urine Culture Aerobic Bacterial - lab collect  -     CBC with platelets and differential    Vaginal discharge    Yeast infection of the vagina  -     fluconazole (DIFLUCAN) 150 MG tablet; Take 1 tablet (150 mg) by mouth daily for 3 doses.      No uti noted uc pending     PLAN: Treatment per orders - also push fluids, may use Pyridium OTC prn. Call or return to clinic prn if these symptoms worsen or fail to improve as anticipated.  Will treat with diflucan for yeast longer course because she had similar symptoms in the past  Follow up if  symptoms fail to improve or worsens   Pt understood and agreed with plan       SUBJECTIVE: Mireya Fritz is a 23 year old female who complains of urinary frequency, urgency and dysuria x 1 days, without flank pain, fever, chills, or also has abnormal vaginal discharge   On continuous ocp , she had similar symptoms before over the past 2 months treated with antibiotics  Had no wet prep then     OBJECTIVE: Appears well, in no apparent distress.  Vital signs are normal. The abdomen is soft without tenderness, guarding, mass, rebound or organomegaly. Left CVA tenderness  noted.   Urine dipstick shows   Results for orders placed or performed in visit on 09/09/24   UA Macroscopic with reflex to Microscopic and Culture - Lab Collect     Status:  Abnormal    Specimen: Urine, Clean Catch   Result Value Ref Range    Color Urine Yellow Colorless, Straw, Light Yellow, Yellow    Appearance Urine Clear Clear    Glucose Urine Negative Negative mg/dL    Bilirubin Urine Negative Negative    Ketones Urine Negative Negative mg/dL    Specific Gravity Urine 1.010 1.003 - 1.035    Blood Urine Trace (A) Negative    pH Urine 5.5 5.0 - 7.0    Protein Albumin Urine Negative Negative mg/dL    Urobilinogen Urine 0.2 0.2, 1.0 E.U./dL    Nitrite Urine Negative Negative    Leukocyte Esterase Urine Small (A) Negative   UA Microscopic with Reflex to Culture     Status: Abnormal   Result Value Ref Range    Bacteria Urine Few (A) None Seen /HPF    RBC Urine 0-2 0-2 /HPF /HPF    WBC Urine 0-5 0-5 /HPF /HPF    Squamous Epithelials Urine Few (A) None Seen /LPF    Narrative    Urine Culture not indicated   CBC with platelets and differential     Status: None   Result Value Ref Range    WBC Count 8.2 4.0 - 11.0 10e3/uL    RBC Count 4.83 3.80 - 5.20 10e6/uL    Hemoglobin 14.2 11.7 - 15.7 g/dL    Hematocrit 41.4 35.0 - 47.0 %    MCV 86 78 - 100 fL    MCH 29.4 26.5 - 33.0 pg    MCHC 34.3 31.5 - 36.5 g/dL    RDW 11.7 10.0 - 15.0 %    Platelet Count 247 150 - 450 10e3/uL    % Neutrophils 41 %    % Lymphocytes 46 %    % Monocytes 7 %    % Eosinophils 6 %    % Basophils 1 %    % Immature Granulocytes 0 %    Absolute Neutrophils 3.4 1.6 - 8.3 10e3/uL    Absolute Lymphocytes 3.7 0.8 - 5.3 10e3/uL    Absolute Monocytes 0.5 0.0 - 1.3 10e3/uL    Absolute Eosinophils 0.5 0.0 - 0.7 10e3/uL    Absolute Basophils 0.1 0.0 - 0.2 10e3/uL    Absolute Immature Granulocytes 0.0 <=0.4 10e3/uL   Wet prep - lab collect     Status: Abnormal    Specimen: Vagina; Swab   Result Value Ref Range    Trichomonas Absent Absent    Yeast Present (A) Absent    Clue Cells Absent Absent    WBCs/high power field 2+ (A) None   CBC with platelets and differential     Status: None    Narrative    The following orders were created  for panel order CBC with platelets and differential.  Procedure                               Abnormality         Status                     ---------                               -----------         ------                     CBC with platelets and d...[[id number]]                      Final result                 Please view results for these tests on the individual orders.         Rekha Otto MD

## 2024-09-11 LAB
BACTERIA UR CULT: ABNORMAL
BACTERIA UR CULT: ABNORMAL

## 2024-09-14 ENCOUNTER — MYC MEDICAL ADVICE (OUTPATIENT)
Dept: PEDIATRICS | Facility: CLINIC | Age: 24
End: 2024-09-14
Payer: COMMERCIAL

## 2024-09-14 ENCOUNTER — NURSE TRIAGE (OUTPATIENT)
Dept: NURSING | Facility: CLINIC | Age: 24
End: 2024-09-14
Payer: COMMERCIAL

## 2024-09-14 NOTE — TELEPHONE ENCOUNTER
Nurse Triage SBAR    Is this a 2nd Level Triage? YES, LICENSED PRACTITIONER REVIEW IS REQUIRED    Situation: has had ongoing issue with urinary symptoms    Background: was recently treated with Diflucan and finished 3 days of treatment and symptoms have returned. She was treated before that with some abx for similar issues. She says she's been treated 3 times in the past 2 months.     Assessment: Urgency to pee  Burning during and not during urination, Foul smelling urine, Pelvic cramping pain level is a 7-8/10 has tried taking Tylenol or IB profun somewhat helps. No diarrhea today but did yesterday.     Protocol Recommended Disposition:   See HCP Within 4 Hours (Or PCP Triage), See More Appropriate Guideline    Recommendation: After talking with On-call, recommend to go to ED due to level of abdominal pain and ability for further testing.      Paged to provider    Does the patient meet one of the following criteria for [facility] visit consideration? 16+ years old, with an [facility] PCP     TIP  Providers, please consider if this condition is appropriate for management at one of our Acute and Diagnostic Services sites.     If patient is a good candidate, please use dotphrase <dot>triageresponse and select Refer to ADS to document.      Provider consult indicated.     Reason for page: 2LT    Page sent to Dr. Alejo ([facility]), Aurelia ACUNA by Answering Service at 1217. Connected via cell at 1225    Fani Moreno RN       Reason for Disposition   [1] Pain or burning with passing urine (urination) AND [2] female   [1] SEVERE pain with urination (e.g., excruciating) AND [2] not improved after 2 hours of pain medicine and Sitz bath    Additional Information   Negative: Shock suspected (e.g., cold/pale/clammy skin, too weak to stand, low BP, rapid pulse)   Negative: Sounds like a life-threatening emergency to the triager   Negative: [1] Unable to urinate (or only a few drops) > 4 hours AND [2] bladder feels very full (e.g., palpable  bladder or strong urge to urinate)   Negative: Vomiting   Negative: Patient sounds very sick or weak to the triager    Protocols used: Urinary Symptoms-A-AH, Urination Pain - Female-A-AH

## 2024-09-17 ENCOUNTER — NURSE TRIAGE (OUTPATIENT)
Dept: PEDIATRICS | Facility: CLINIC | Age: 24
End: 2024-09-17
Payer: COMMERCIAL

## 2024-09-17 ENCOUNTER — HOSPITAL ENCOUNTER (EMERGENCY)
Facility: CLINIC | Age: 24
Discharge: HOME | End: 2024-09-17
Attending: EMERGENCY MEDICINE | Admitting: EMERGENCY MEDICINE
Payer: COMMERCIAL

## 2024-09-17 ENCOUNTER — APPOINTMENT (OUTPATIENT)
Dept: CT IMAGING | Facility: CLINIC | Age: 24
End: 2024-09-17
Attending: EMERGENCY MEDICINE
Payer: COMMERCIAL

## 2024-09-17 VITALS
HEART RATE: 88 BPM | TEMPERATURE: 98.5 F | OXYGEN SATURATION: 100 % | SYSTOLIC BLOOD PRESSURE: 131 MMHG | RESPIRATION RATE: 18 BRPM | DIASTOLIC BLOOD PRESSURE: 102 MMHG

## 2024-09-17 DIAGNOSIS — N39.0 URINARY TRACT INFECTION WITHOUT HEMATURIA, SITE UNSPECIFIED: ICD-10-CM

## 2024-09-17 LAB
ALBUMIN SERPL BCG-MCNC: 4.5 G/DL (ref 3.5–5.2)
ALBUMIN UR-MCNC: NEGATIVE MG/DL
ALP SERPL-CCNC: 63 U/L (ref 40–150)
ALT SERPL W P-5'-P-CCNC: 17 U/L (ref 0–50)
ANION GAP SERPL CALCULATED.3IONS-SCNC: 11 MMOL/L (ref 7–15)
APPEARANCE UR: CLEAR
AST SERPL W P-5'-P-CCNC: 21 U/L (ref 0–45)
BASOPHILS # BLD AUTO: 0.1 10E3/UL (ref 0–0.2)
BASOPHILS NFR BLD AUTO: 1 %
BILIRUB SERPL-MCNC: 0.2 MG/DL
BILIRUB UR QL STRIP: NEGATIVE
BUN SERPL-MCNC: 7.8 MG/DL (ref 6–20)
CALCIUM SERPL-MCNC: 9.5 MG/DL (ref 8.8–10.4)
CHLORIDE SERPL-SCNC: 101 MMOL/L (ref 98–107)
COLOR UR AUTO: ABNORMAL
CREAT SERPL-MCNC: 0.85 MG/DL (ref 0.51–0.95)
EGFRCR SERPLBLD CKD-EPI 2021: >90 ML/MIN/1.73M2
EOSINOPHIL # BLD AUTO: 0.3 10E3/UL (ref 0–0.7)
EOSINOPHIL NFR BLD AUTO: 4 %
ERYTHROCYTE [DISTWIDTH] IN BLOOD BY AUTOMATED COUNT: 11.9 % (ref 10–15)
GLUCOSE SERPL-MCNC: 83 MG/DL (ref 70–99)
GLUCOSE UR STRIP-MCNC: NEGATIVE MG/DL
HCG UR QL: NEGATIVE
HCO3 SERPL-SCNC: 26 MMOL/L (ref 22–29)
HCT VFR BLD AUTO: 39.8 % (ref 35–47)
HGB BLD-MCNC: 13.4 G/DL (ref 11.7–15.7)
HGB UR QL STRIP: NEGATIVE
HOLD SPECIMEN: NORMAL
HOLD SPECIMEN: NORMAL
IMM GRANULOCYTES # BLD: 0 10E3/UL
IMM GRANULOCYTES NFR BLD: 0 %
KETONES UR STRIP-MCNC: NEGATIVE MG/DL
LEUKOCYTE ESTERASE UR QL STRIP: ABNORMAL
LYMPHOCYTES # BLD AUTO: 3 10E3/UL (ref 0.8–5.3)
LYMPHOCYTES NFR BLD AUTO: 42 %
MCH RBC QN AUTO: 29 PG (ref 26.5–33)
MCHC RBC AUTO-ENTMCNC: 33.7 G/DL (ref 31.5–36.5)
MCV RBC AUTO: 86 FL (ref 78–100)
MONOCYTES # BLD AUTO: 0.4 10E3/UL (ref 0–1.3)
MONOCYTES NFR BLD AUTO: 6 %
MUCOUS THREADS #/AREA URNS LPF: PRESENT /LPF
NEUTROPHILS # BLD AUTO: 3.4 10E3/UL (ref 1.6–8.3)
NEUTROPHILS NFR BLD AUTO: 48 %
NITRATE UR QL: NEGATIVE
NRBC # BLD AUTO: 0 10E3/UL
NRBC BLD AUTO-RTO: 0 /100
PH UR STRIP: 6 [PH] (ref 5–7)
PLATELET # BLD AUTO: 233 10E3/UL (ref 150–450)
POTASSIUM SERPL-SCNC: 3.5 MMOL/L (ref 3.4–5.3)
PROT SERPL-MCNC: 7.2 G/DL (ref 6.4–8.3)
RBC # BLD AUTO: 4.62 10E6/UL (ref 3.8–5.2)
RBC URINE: <1 /HPF
SODIUM SERPL-SCNC: 138 MMOL/L (ref 135–145)
SP GR UR STRIP: 1.01 (ref 1–1.03)
SQUAMOUS EPITHELIAL: <1 /HPF
UROBILINOGEN UR STRIP-MCNC: NORMAL MG/DL
WBC # BLD AUTO: 7.1 10E3/UL (ref 4–11)
WBC URINE: 25 /HPF

## 2024-09-17 PROCEDURE — 74176 CT ABD & PELVIS W/O CONTRAST: CPT

## 2024-09-17 PROCEDURE — 85025 COMPLETE CBC W/AUTO DIFF WBC: CPT | Performed by: EMERGENCY MEDICINE

## 2024-09-17 PROCEDURE — 81025 URINE PREGNANCY TEST: CPT | Performed by: EMERGENCY MEDICINE

## 2024-09-17 PROCEDURE — 96360 HYDRATION IV INFUSION INIT: CPT

## 2024-09-17 PROCEDURE — 82040 ASSAY OF SERUM ALBUMIN: CPT | Performed by: EMERGENCY MEDICINE

## 2024-09-17 PROCEDURE — 96361 HYDRATE IV INFUSION ADD-ON: CPT

## 2024-09-17 PROCEDURE — 36415 COLL VENOUS BLD VENIPUNCTURE: CPT | Performed by: EMERGENCY MEDICINE

## 2024-09-17 PROCEDURE — 87186 SC STD MICRODIL/AGAR DIL: CPT | Performed by: EMERGENCY MEDICINE

## 2024-09-17 PROCEDURE — 81001 URINALYSIS AUTO W/SCOPE: CPT | Performed by: EMERGENCY MEDICINE

## 2024-09-17 PROCEDURE — 87086 URINE CULTURE/COLONY COUNT: CPT | Performed by: EMERGENCY MEDICINE

## 2024-09-17 PROCEDURE — 99284 EMERGENCY DEPT VISIT MOD MDM: CPT | Mod: 25

## 2024-09-17 PROCEDURE — 258N000003 HC RX IP 258 OP 636: Performed by: EMERGENCY MEDICINE

## 2024-09-17 PROCEDURE — 250N000013 HC RX MED GY IP 250 OP 250 PS 637: Performed by: EMERGENCY MEDICINE

## 2024-09-17 RX ORDER — CEFTRIAXONE 2 G/1
2 INJECTION, POWDER, FOR SOLUTION INTRAMUSCULAR; INTRAVENOUS ONCE
Status: DISCONTINUED | OUTPATIENT
Start: 2024-09-17 | End: 2024-09-17

## 2024-09-17 RX ORDER — CEPHALEXIN 500 MG/1
500 CAPSULE ORAL 3 TIMES DAILY
Qty: 30 CAPSULE | Refills: 0 | Status: SHIPPED | OUTPATIENT
Start: 2024-09-17 | End: 2024-09-19

## 2024-09-17 RX ORDER — CEPHALEXIN 500 MG/1
500 CAPSULE ORAL ONCE
Status: COMPLETED | OUTPATIENT
Start: 2024-09-17 | End: 2024-09-17

## 2024-09-17 RX ADMIN — SODIUM CHLORIDE 1000 ML: 9 INJECTION, SOLUTION INTRAVENOUS at 18:47

## 2024-09-17 RX ADMIN — CEPHALEXIN 500 MG: 500 CAPSULE ORAL at 21:08

## 2024-09-17 ASSESSMENT — ACTIVITIES OF DAILY LIVING (ADL)
ADLS_ACUITY_SCORE: 35

## 2024-09-17 ASSESSMENT — COLUMBIA-SUICIDE SEVERITY RATING SCALE - C-SSRS
1. IN THE PAST MONTH, HAVE YOU WISHED YOU WERE DEAD OR WISHED YOU COULD GO TO SLEEP AND NOT WAKE UP?: NO
2. HAVE YOU ACTUALLY HAD ANY THOUGHTS OF KILLING YOURSELF IN THE PAST MONTH?: NO
6. HAVE YOU EVER DONE ANYTHING, STARTED TO DO ANYTHING, OR PREPARED TO DO ANYTHING TO END YOUR LIFE?: NO

## 2024-09-17 NOTE — ED TRIAGE NOTES
Left lower abdominal pain. Patient states she has a history of UTIs and was recently treated for a yeast infection.      Triage Assessment (Adult)       Row Name 09/17/24 6431          Triage Assessment    Airway WDL WDL        Respiratory WDL    Respiratory WDL WDL        Skin Circulation/Temperature WDL    Skin Circulation/Temperature WDL WDL        Cardiac WDL    Cardiac WDL WDL

## 2024-09-17 NOTE — TELEPHONE ENCOUNTER
"  Nurse Triage SBAR    Is this a 2nd Level Triage? YES, LICENSED PRACTITIONER REVIEW IS REQUIRED    Situation: abdominal pain    Background: Pt has been having cramping in abdomen for past 2 weeks and has had urinary symptoms for past few months. This afternoon, pt reports sudden onset of a different kind of abdominal pain starting about an hour ago.    Assessment:  Sudden onset of abdominal pain starting about an hour ago. Pt states, \" it feels different than the cramping I had the past two weeks. It feels like a ball of something and it hurts when I push on it. My stomach feels super full\" She rates pain at 4 out of 10 and reports it is below her belly button above her pelvis and to the left side. Intermittent back pain started a few days ago. Diarrhea on and off for past week.  Intermittent burning with urination (off and on for 3 months). Foul smelling urine (for about 3 months). Normal urinary output, clear urine        Denies vomiting    Protocol Recommended Disposition:   Call ADS/Go to ED/UCC Now (Or To Office with PCP Approval)    Recommendation: Recommended pt head to ED now. Pt agrees with plan. She will have someone take her now.    Madelin MANZO RN          Does the patient meet one of the following criteria for ADS visit consideration? 16+ years old, with an [facility] PCP     TIP  Providers, please consider if this condition is appropriate for management at one of our Acute and Diagnostic Services sites.     If patient is a good candidate, please use dotphrase <dot>triageresponse and select Refer to ADS to document.    Reason for Disposition   MILD TO MODERATE constant pain lasting > 2 hours    Additional Information   Negative: Passed out (i.e., fainted, collapsed and was not responding)   Negative: Shock suspected (e.g., cold/pale/clammy skin, too weak to stand, low BP, rapid pulse)   Negative: Sounds like a life-threatening emergency to the triager   Negative: Followed an abdomen (stomach) injury   " "Negative: Chest pain   Negative: Abdominal pain and pregnant < 20 weeks   Negative: Abdominal pain and pregnant 20 or more weeks   Negative: Pain is mainly in upper abdomen (if needed ask: 'is it mainly above the belly button?')   Negative: Abdomen bloating or swelling are main symptoms   Negative: SEVERE abdominal pain (e.g., excruciating)   Negative: Vomiting red blood or black (coffee ground) material   Negative: Blood in bowel movements  (Exception: Blood on surface of BM with constipation.)   Negative: Black or tarry bowel movements  (Exception: Chronic-unchanged black-grey BMs AND is taking iron pills or Pepto-Bismol.)    Answer Assessment - Initial Assessment Questions  1. LOCATION: \"Where does it hurt?\"       Right above pelvis a little bit to the left  2. RADIATION: \"Does the pain shoot anywhere else?\" (e.g., chest, back)      no  3. ONSET: \"When did the pain begin?\" (e.g., minutes, hours or days ago)       This afternoon about an hour ago feels different, feels like a ball of something and it hurts when I push on it, stomach feels super full (had abdominal cramps the past 2 weeks)  4. SUDDEN: \"Gradual or sudden onset?\"      sudden  5. PATTERN \"Does the pain come and go, or is it constant?\"     - If it comes and goes: \"How long does it last?\" \"Do you have pain now?\"      (Note: Comes and goes means the pain is intermittent. It goes away completely between bouts.)     - If constant: \"Is it getting better, staying the same, or getting worse?\"       (Note: Constant means the pain never goes away completely; most serious pain is constant and gets worse.)       constant  6. SEVERITY: \"How bad is the pain?\"  (e.g., Scale 1-10; mild, moderate, or severe)     - MILD (1-3): Doesn't interfere with normal activities, abdomen soft and not tender to touch.      - MODERATE (4-7): Interferes with normal activities or awakens from sleep, abdomen tender to touch.      - SEVERE (8-10): Excruciating pain, doubled over, unable " "to do any normal activities.        4 out of 10  7. RECURRENT SYMPTOM: \"Have you ever had this type of stomach pain before?\" If Yes, ask: \"When was the last time?\" and \"What happened that time?\"       no  8. CAUSE: \"What do you think is causing the stomach pain?\"      unsure  9. RELIEVING/AGGRAVATING FACTORS: \"What makes it better or worse?\" (e.g., antacids, bending or twisting motion, bowel movement)      no  10. OTHER SYMPTOMS: \"Do you have any other symptoms?\" (e.g., back pain, diarrhea, fever, urination pain, vomiting)  Back pain intermittent started a few days ago  Diarrhea on and off for past week  Intermittent burning with urination (off and on for 3 months)  Foul smelling urine (for about 3 months)  Normal urinary output, clear urine        Denies vomiting  11. PREGNANCY: \"Is there any chance you are pregnant?\" \"When was your last menstrual period?\"        No, LMP- no menstrual period due to continuous birth control    Protocols used: Abdominal Pain - Female-A-[place]    "
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
## 2024-09-18 ENCOUNTER — PATIENT OUTREACH (OUTPATIENT)
Dept: PEDIATRICS | Facility: CLINIC | Age: 24
End: 2024-09-18
Payer: COMMERCIAL

## 2024-09-18 LAB — BACTERIA UR CULT: ABNORMAL

## 2024-09-18 NOTE — TELEPHONE ENCOUNTER
Call patient for hospital follow up.  Most Recent Admission Date: 9/17/2024   Most Recent Admission Diagnosis:      Most Recent Discharge Date: 9/17/2024   Most Recent Discharge Diagnosis: Urinary tract infection without hematuria, site unspecified - N39.0     Med changes: add   Cephalexin 500 mg Oral 3 TIMES DAILY    After Visit Summary follow up recommendations: follow up with Primary Care Provider in 1 week    Primary care appointment needed within 7days    Primary care hospital follow up appointment has not been made.    Fani Aguila RN

## 2024-09-18 NOTE — TELEPHONE ENCOUNTER
Transitions of Care Outreach  Chief Complaint   Patient presents with    Hospital F/U     ER 9/17/24       Most Recent Admission Date: 9/17/2024   Most Recent Admission Diagnosis:      Most Recent Discharge Date: 9/17/2024   Most Recent Discharge Diagnosis: Urinary tract infection without hematuria, site unspecified - N39.0     Transitions of Care Assessment    Discharge Assessment  How are your symptoms? (Red Flag symptoms escalate to triage hotline per guidelines): Improved  Do you know how to contact your clinic care team if you have future questions or changes to your health status? : Yes  Does the patient have their discharge instructions? : Yes  Does the patient have questions regarding their discharge instructions? : No  Were you started on any new medications or were there changes to any of your previous medications? : Yes  Does the patient have all of their medications?: Yes  Do you have questions regarding any of your medications? : No    Follow up Plan          Future Appointments   Date Time Provider Department Center   9/19/2024  1:30 PM Mansi Johansen, [facility]       Outpatient Plan as outlined on AVS reviewed with patient.    For any urgent concerns, please contact our 24 hour nurse triage line: [phone] ([phone])       Fani Aguila RN

## 2024-09-18 NOTE — DISCHARGE INSTRUCTIONS
As we discussed, your CT scan does not show any evidence of an infection in your kidney, though with you having pain there, I do think it is important for you to finish all of your antibiotics and come back to the ER immediately with any nausea or vomiting or if you cannot keep your antibiotics down.  Come back to the ER with any other concerns you have.  Do check in with your regular doctor to make sure that things are getting better in the week ahead.

## 2024-09-19 ENCOUNTER — OFFICE VISIT (OUTPATIENT)
Dept: PEDIATRICS | Facility: CLINIC | Age: 24
End: 2024-09-19
Payer: COMMERCIAL

## 2024-09-19 ENCOUNTER — TELEPHONE (OUTPATIENT)
Dept: NURSING | Facility: CLINIC | Age: 24
End: 2024-09-19

## 2024-09-19 VITALS
SYSTOLIC BLOOD PRESSURE: 120 MMHG | OXYGEN SATURATION: 96 % | WEIGHT: 223.2 LBS | DIASTOLIC BLOOD PRESSURE: 80 MMHG | TEMPERATURE: 98.6 F | BODY MASS INDEX: 34.96 KG/M2 | HEART RATE: 110 BPM | RESPIRATION RATE: 16 BRPM

## 2024-09-19 DIAGNOSIS — N39.0 RECURRENT UTI: Primary | ICD-10-CM

## 2024-09-19 DIAGNOSIS — N39.0 URINARY TRACT INFECTION WITHOUT HEMATURIA, SITE UNSPECIFIED: Primary | ICD-10-CM

## 2024-09-19 PROCEDURE — 99213 OFFICE O/P EST LOW 20 MIN: CPT | Performed by: PHYSICIAN ASSISTANT

## 2024-09-19 PROCEDURE — G2211 COMPLEX E/M VISIT ADD ON: HCPCS | Performed by: PHYSICIAN ASSISTANT

## 2024-09-19 RX ORDER — CIPROFLOXACIN 500 MG/1
500 TABLET, FILM COATED ORAL 2 TIMES DAILY
Qty: 12 TABLET | Refills: 0 | Status: SHIPPED | OUTPATIENT
Start: 2024-09-19 | End: 2024-09-25

## 2024-09-19 ASSESSMENT — PATIENT HEALTH QUESTIONNAIRE - PHQ9
SUM OF ALL RESPONSES TO PHQ QUESTIONS 1-9: 13
10. IF YOU CHECKED OFF ANY PROBLEMS, HOW DIFFICULT HAVE THESE PROBLEMS MADE IT FOR YOU TO DO YOUR WORK, TAKE CARE OF THINGS AT HOME, OR GET ALONG WITH OTHER PEOPLE: SOMEWHAT DIFFICULT
SUM OF ALL RESPONSES TO PHQ QUESTIONS 1-9: 13

## 2024-09-19 NOTE — TELEPHONE ENCOUNTER
[facility]    Reason for call: Lab Result Notification     Lab Result (including Rx patient on, if applicable).  If culture, copy of lab report at bottom.  Lab Result: See below  Cephalexin (Keflex) 500 mg capsule, 1 capsule (500 mg) by mouth 3 times daily for 10 days. RESISTANT  Creatinine Level (mg/dl)   Creatinine   Date Value Ref Range Status   09/17/2024 0.85 0.51 - 0.95 mg/dL Final   07/10/2020 0.71 0.52 - 1.04 mg/dL Final    Creatinine clearance (ml/min), if applicable    Serum creatinine: 0.85 mg/dL 09/17/24 1847  Estimated creatinine clearance: 123.9 mL/min     RN Recommendations/Instructions per [place] ED lab result protocol:   [facility] ED lab result protocol utilized: urine culture  Advise to discontinue current antibiotic.   Instruct to start antibiotic: Ciprofloxacin    Patient's current Symptoms:   Improved. Denies fever, back pain, and side pain. Has follow up appt today.     Does patient fit any of the following criteria?:     Has allergy to medications: No    Is breastfeeding: No    Is pregnant: No    On Coumadin/Warfarin: No      Patient/care giver notified to contact your PCP clinic or return to the Emergency department if your:  Symptoms return.  Symptoms do not improve after 3 days on antibiotic.  Symptoms do not resolve after completing antibiotic.  Symptoms worsen or other concerning symptoms.         Adria Alfonso RN

## 2024-09-19 NOTE — PROGRESS NOTES
Assessment & Plan     Recurrent UTI  Due to recurrent UTI recommend Uro/Gyn referral to discuss options.   Pt is improving on antibiotics and is being switched to Cipro at this time.   Pt advised to send [employer] message update if symptoms return after treatment.   Will likely repeat UA to confirm bacteria.   Encouraged to stay hydrated at this time.   If pt has fever, severe back pain, nausea, vomiting, abdominal pain recommend follow-up in UC/ED.  - Adult Uro/Gyn  Referral    The longitudinal plan of care for the diagnosis(es)/condition(s) as documented were addressed during this visit. Due to the added complexity in care, I will continue to support Mireya in the subsequent management and with ongoing continuity of care with PCP.    MED REC REQUIRED  Post Medication Reconciliation Status:  Discharge medications reconciled, continue medications without change    FUTURE APPOINTMENTS:       - Follow-up visit as needed for worsening symptoms or not improving    Subjective   Mireya is a 23 year old, presenting for the following health issues:  Follow Up        9/19/2024     1:29 PM   Additional Questions   Roomed by Laurence Avery   Accompanied by DAQUAN       Via the Health Maintenance questionnaire, the patient has reported the following services have been completed -Chlamydia: [facility] 2024-07-01, this information has been sent to the abstraction team.  [facility]       ED/UC Followup:    Facility:  [facility]   Date of visit: 09/17/2024  Reason for visit: Severe dysuria   Current Status: Patient states that symptoms have improved slightly.     Patient is a 23 y.o. female who presents to the clinic for follow-up ED visit for UTI. Pt reports she has been getting recurrent UTIs since June. Pt reports she feels treatment helps and soon after the antibiotics are completed she has pain again. Pt was started on Keflex and received a call to switch to Cipro due to culture. She still needs to  pick this up. She did notice improvement since starting the Keflex. Pt reports she has been taking Azo to see if this helps. Pt reports he symptoms include pelvic pain, back pain and dysuria.       Review of Systems  Constitutional, HEENT, cardiovascular, pulmonary, gi and gu systems are negative, except as otherwise noted.      Objective    /80 (BP Location: Right arm, Patient Position: Sitting, Cuff Size: Adult Large)   Pulse 110   Temp 98.6  F (37  C) (Tympanic)   Resp 16   Wt 101.2 kg (223 lb 3.2 oz)   LMP  (LMP Unknown)   SpO2 96%   BMI 34.96 kg/m    Body mass index is 34.96 kg/m .    Physical Exam   GENERAL: alert and no distress  EYES: Eyes grossly normal to inspection, PERRL and conjunctivae and sclerae normal  RESP: lungs clear to auscultation - no rales, rhonchi or wheezes  CV: regular rate and rhythm, normal S1 S2, no S3 or S4, no murmur, click or rub, no peripheral edema  ABDOMEN: soft, nontender, NABS  MS: no gross musculoskeletal defects noted, no edema  SKIN: no suspicious lesions or rashes  BACK: no CVA tenderness, no paralumbar tenderness  PSYCH: mentation appears normal, affect normal/bright          Signed Electronically by: Mansi Johansen PA-C    Answers submitted by the patient for this visit:  Patient Health Questionnaire (Submitted on 9/19/2024)  If you checked off any problems, how difficult have these problems made it for you to do your work, take care of things at home, or get along with other people?: Somewhat difficult  PHQ9 TOTAL SCORE: 13

## 2024-09-20 ENCOUNTER — MYC MEDICAL ADVICE (OUTPATIENT)
Dept: PEDIATRICS | Facility: CLINIC | Age: 24
End: 2024-09-20
Payer: COMMERCIAL

## 2024-09-20 ENCOUNTER — NURSE TRIAGE (OUTPATIENT)
Dept: PEDIATRICS | Facility: CLINIC | Age: 24
End: 2024-09-20
Payer: COMMERCIAL

## 2024-09-20 NOTE — TELEPHONE ENCOUNTER
S-(situation): patient sent mychart noting ongoing symptoms since switching antibiotics    B-(background): patient seen in ER on 9/17 for UTI. Started on Keflex while awaiting urine culture. Patient switched to ciprofloxacin on 9/19 (first dose in evening, second dose this morning).    A-(assessment): patient reports new onset of burning with urination and bladder spasms. Continues to have discomfort in left lower abdomen/flank area (present since seen in ER). Able to urinate, feels like she has a constant urge to go.    Denies fever. No blood in urine.    R-(recommendations): Per RN protocol Home Care   Reviewed care advise under care tab, reviewed that symptoms should improve with more time on current sensitive abx. Instructed patient to continue to monitor symptoms and call back with new or worsening symptoms (specifically fever, worsening flank pain, blood in urine) patient verbalized understanding and agreement in plan.    Chana RABAGO RN  9/20/2024 at 12:06 PM      Reason for Disposition   Taking antibiotic < 72 hours (3 days) for UTI and painful urination or frequency is SAME (unchanged, not better)    Additional Information   Negative: Shock suspected (e.g., cold/pale/clammy skin, too weak to stand, low BP, rapid pulse)   Negative: Sounds like a life-threatening emergency to the triager   Negative: Urinary tract infection suspected, but not taking antibiotics   Negative: Unable to urinate (or only a few drops) > 4 hours and bladder feels very full (e.g., palpable bladder or strong urge to urinate)   Negative: Passing pure blood or large blood clots (i.e., size > a dime)  (Exceptions: Flecks, small strands, or pinkish-red color.)   Negative: Fever > 103 F (39.4 C)   Negative: Patient sounds very sick or weak to the triager   Negative: SEVERE pain (e.g., excruciating) and no improvement 2 hours after pain medications   Negative: Fever > 100.0 F (37.8 C) and new-onset since starting antibiotics   Negative: Side  "(flank) or lower back pain and new-onset since starting antibiotics   Negative: Taking antibiotic > 24 hours for UTI and flank or lower back pain getting worse   Negative: Vomiting 2 or more times and interferes with taking oral antibiotic   Negative: Taking antibiotic > 24 hours for UTI (urinary tract or bladder infection) and fever persists (still has fever)   Negative: Taking antibiotic > 72 hours (3 days) for UTI and flank or lower back pain is SAME (unchanged, not better)   Negative: Taking antibiotic > 72 hours (3 days) for UTI and painful urination or frequency is SAME (unchanged, not better)   Negative: Patient wants to be seen   Negative: Diabetes mellitus or weak immune system (e.g., HIV positive, cancer chemo, splenectomy, organ transplant, chronic steroids)   Negative: Sickle cell disease   Negative: Taking antibiotic < 24 hours for UTI and fever persists (still has fever)    Answer Assessment - Initial Assessment Questions  1. MAIN SYMPTOM: \"What is the main symptom you are concerned about?\" (e.g., painful urination, urine frequency)      Burning with urination  2. BETTER-SAME-WORSE: \"Are you getting better, staying the same, or getting worse compared to how you felt at your last visit to the doctor (most recent medical visit)?\"      Better than this morning  3. PAIN: \"How bad is the pain?\"  (e.g., Scale 1-10; mild, moderate, or severe)    - MILD (1-3): complains slightly about urination hurting    - MODERATE (4-7): interferes with normal activities      - SEVERE (8-10): excruciating, unwilling or unable to urinate because of the pain       4/10  4. FEVER: \"Do you have a fever?\" If Yes, ask: \"What is it, how was it measured, and when did it start?\"      no  5. OTHER SYMPTOMS: \"Do you have any other symptoms?\" (e.g., blood in the urine, flank pain, vaginal discharge)      No blood in urine, does have flank pain - present at the ER  6. DIAGNOSIS: \"When was the UTI diagnosed?\" \"By whom?\" \"Was it a kidney " "infection, bladder infection or both?\"      ER 9/17  7. ANTIBIOTIC: \"What antibiotic(s) are you taking?\" \"How many times per day?\"      Started keflex on 9/17, switched to cipro 9/19 - has taken 2 doses so far  8. ANTIBIOTIC - START DATE: \"When did you start taking the antibiotic?\"      See above    Protocols used: Urinary Tract Infection on Antibiotic Follow-up Call - Female-A-[place]    "

## 2024-09-30 ENCOUNTER — MYC MEDICAL ADVICE (OUTPATIENT)
Dept: NEUROLOGY | Facility: CLINIC | Age: 24
End: 2024-09-30
Payer: COMMERCIAL

## 2024-10-01 DIAGNOSIS — N94.6 DYSMENORRHEA: ICD-10-CM

## 2024-10-01 RX ORDER — NORETHINDRONE ACETATE AND ETHINYL ESTRADIOL 1MG-20(21)
1 KIT ORAL DAILY
Qty: 84 TABLET | Refills: 0 | Status: SHIPPED | OUTPATIENT
Start: 2024-10-01

## 2024-10-11 ENCOUNTER — MYC MEDICAL ADVICE (OUTPATIENT)
Dept: PEDIATRICS | Facility: CLINIC | Age: 24
End: 2024-10-11
Payer: COMMERCIAL

## 2024-10-27 ENCOUNTER — E-VISIT (OUTPATIENT)
Dept: PEDIATRICS | Facility: CLINIC | Age: 24
End: 2024-10-27
Payer: COMMERCIAL

## 2024-10-27 DIAGNOSIS — F41.1 GAD (GENERALIZED ANXIETY DISORDER): Primary | ICD-10-CM

## 2024-10-27 DIAGNOSIS — F32.1 MODERATE MAJOR DEPRESSION (H): ICD-10-CM

## 2024-10-27 PROCEDURE — 99421 OL DIG E/M SVC 5-10 MIN: CPT | Performed by: PEDIATRICS

## 2024-10-27 RX ORDER — SERTRALINE HYDROCHLORIDE 100 MG/1
150 TABLET, FILM COATED ORAL DAILY
Qty: 135 TABLET | Refills: 3 | Status: SHIPPED | OUTPATIENT
Start: 2024-10-27

## 2024-10-27 ASSESSMENT — ANXIETY QUESTIONNAIRES
GAD7 TOTAL SCORE: 9
3. WORRYING TOO MUCH ABOUT DIFFERENT THINGS: SEVERAL DAYS
2. NOT BEING ABLE TO STOP OR CONTROL WORRYING: SEVERAL DAYS
7. FEELING AFRAID AS IF SOMETHING AWFUL MIGHT HAPPEN: SEVERAL DAYS
GAD7 TOTAL SCORE: 9
8. IF YOU CHECKED OFF ANY PROBLEMS, HOW DIFFICULT HAVE THESE MADE IT FOR YOU TO DO YOUR WORK, TAKE CARE OF THINGS AT HOME, OR GET ALONG WITH OTHER PEOPLE?: SOMEWHAT DIFFICULT
GAD7 TOTAL SCORE: 9
IF YOU CHECKED OFF ANY PROBLEMS ON THIS QUESTIONNAIRE, HOW DIFFICULT HAVE THESE PROBLEMS MADE IT FOR YOU TO DO YOUR WORK, TAKE CARE OF THINGS AT HOME, OR GET ALONG WITH OTHER PEOPLE: SOMEWHAT DIFFICULT
6. BECOMING EASILY ANNOYED OR IRRITABLE: NEARLY EVERY DAY
1. FEELING NERVOUS, ANXIOUS, OR ON EDGE: SEVERAL DAYS
5. BEING SO RESTLESS THAT IT IS HARD TO SIT STILL: SEVERAL DAYS
7. FEELING AFRAID AS IF SOMETHING AWFUL MIGHT HAPPEN: SEVERAL DAYS
4. TROUBLE RELAXING: SEVERAL DAYS

## 2024-10-27 ASSESSMENT — PATIENT HEALTH QUESTIONNAIRE - PHQ9
10. IF YOU CHECKED OFF ANY PROBLEMS, HOW DIFFICULT HAVE THESE PROBLEMS MADE IT FOR YOU TO DO YOUR WORK, TAKE CARE OF THINGS AT HOME, OR GET ALONG WITH OTHER PEOPLE: VERY DIFFICULT
SUM OF ALL RESPONSES TO PHQ QUESTIONS 1-9: 14
SUM OF ALL RESPONSES TO PHQ QUESTIONS 1-9: 14

## 2024-10-27 NOTE — TELEPHONE ENCOUNTER
Provider E-Visit time total (minutes): 5        .      7/5/2024     8:18 AM 9/19/2024     1:26 PM 10/27/2024    11:19 AM   PHQ   PHQ-9 Total Score 10 13 14    Q9: Thoughts of better off dead/self-harm past 2 weeks Not at all  Not at all  Not at all        Patient-reported         4/29/2022     3:22 PM 6/14/2023     8:32 AM 10/27/2024    11:19 AM   DIAZ-7 SCORE   Total Score 8 (mild anxiety) 7 (mild anxiety) 9 (mild anxiety)   Total Score 8 7 9        Patient-reported

## 2024-10-28 ASSESSMENT — PATIENT HEALTH QUESTIONNAIRE - PHQ9: SUM OF ALL RESPONSES TO PHQ QUESTIONS 1-9: 14

## 2024-11-06 ENCOUNTER — OFFICE VISIT (OUTPATIENT)
Dept: UROLOGY | Facility: CLINIC | Age: 24
End: 2024-11-06
Attending: PHYSICIAN ASSISTANT
Payer: COMMERCIAL

## 2024-11-06 VITALS
SYSTOLIC BLOOD PRESSURE: 124 MMHG | HEART RATE: 104 BPM | HEIGHT: 67 IN | DIASTOLIC BLOOD PRESSURE: 77 MMHG | WEIGHT: 222 LBS | BODY MASS INDEX: 34.84 KG/M2

## 2024-11-06 DIAGNOSIS — N39.0 RECURRENT UTI: Primary | ICD-10-CM

## 2024-11-06 PROCEDURE — 99213 OFFICE O/P EST LOW 20 MIN: CPT | Performed by: OBSTETRICS & GYNECOLOGY

## 2024-11-06 RX ORDER — NITROFURANTOIN 25; 75 MG/1; MG/1
100 CAPSULE ORAL DAILY
Qty: 90 CAPSULE | Refills: 1 | Status: SHIPPED | OUTPATIENT
Start: 2024-11-06

## 2024-11-06 NOTE — PROGRESS NOTES
Chief Complaint   Patient presents with    Establish Care     Recurrent UTI'S    Angela Vance LPN

## 2024-11-06 NOTE — PROGRESS NOTES
Mireya Fritz is a 23 year old female who comes in today with concerns for recurrent bladder infection.    Mireya reports a history of six UTIs this past year. Each time she experienced painful urination and reported to [facility] for culture and treatment. She has expressor software messages showing culture confirmed positives with E. Coli x 2. SYmptom onset is urethral burning which is constant-both when urinating and at rest. Her recent trip to the ED with confirmed E. Coli UTI also started this way and she did have flank pain. No history of pylo, Abdominal US or CT. Between infections she takes OTC Azo with cranberry and occasionally D-mannose. 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 Sex is not a trigger if she urinates after sex.     History of UTI  - 23: E coli  - 24 <10,000FKI/mL Strep agalactiae  - 2024: E coli treated via [facility]  - 2024 E coli treated via [facility]    Hydration   > 70 ounces/day water  Soda: 2 cans diet coke/day with no associated bladder symptoms    Family history: father straight caths, unaware of associated diagnosis. Grandfather  of kidney cancer    Surgical history: No abdominal surgeries. Wt loss surgery with liposuction at [facility] 2023.     Patient's history of childhood UTI none  Additional risk factors include: multiple courses antibiotic in past year with keflex and ciprofloxacin per chart review    Patient's symptoms today include: none  Treatments tried: keflex, ciprofloxacin; OTC prevention with D mannose, cranberry tablets; symptom relief with NSAIDS and Azo    ROS:  CONSTITUTIONAL: NEGATIVE for fever, chills  EYES: NEGATIVE for vision changes   RESP: NEGATIVE for significant cough or SOB  CV: NEGATIVE for chest pain, palpitations   GI: NEGATIVE for nausea, abdominal pain, heartburn, or change in bowel habits  : NEGATIVE  "for frequency, dysuria, or hematuria  MUSCULOSKELETAL: NEGATIVE for significant arthralgias or myalgia  NEURO: NEGATIVE for weakness, dizziness or paresthesias or headache    OBJECTIVE:  /77   Pulse 104   Ht 1.702 m (5' 7\")   Wt 100.7 kg (222 lb)   LMP  (LMP Unknown)   BMI 34.77 kg/m     EXAM:  GENERAL APPEARANCE: healthy, alert and no distress  RESP: no increased work of breathing  CV: no SOB, chest pressure or pain  ABDOMEN:  soft, nontender, no CVA tenderness    PLAN:  (N39.0) Recurrent UTI  Encounter Diagnosis   Name Primary?    Recurrent UTI      Comment: Mireya has experienced four culture proven UTI's over the past year. We discussed prevention strategies including OTC 25 PAC cranberry and D-mannose versus a daily prophylactic antibiotic. She would like to trial prophylactic Macrobid over the next 6 months and return to clinic at the first UTI symptom to get a culture.     We discussed using [employer] to obtain better price for Macrobid 90 day supply not covered by insurance.     Plan:   - MyChart message us and come to clinic with any UTI symptoms to submit Urine culture    - START Macrobid preventative antibiotic   - Consider continuing your cranberry supplement. Look for 25 PAC cranberry supplements like Uquora ( www.uqora.Encision ) or Elura (ww.APS )   - Return to clinic in 6 months or sooner as needed for evaluation of this plan     I spent a total of 30 minutes with  Mireya Fritz assessing patient's relevant history, evaluating patient, discussing treatment options and coordinating care.     Dari RYAN  Female Pelvic Medicine and Reconstructive Surgery ( Urogynecology )    I attest that I was present for the history and personally performed the physical exam and medical decision making and that I agree with the findings and treatment plan outlined above.     I spent a total of 30 minutes with  Ms. Fritz  on the date of the encounter in chart review, face to face " patient visit, review of tests, documentation and/or discussion with other providers about the issues documented above.     Fabio Steele MD  Professor, OB/GYN  Urogynecologist

## 2024-11-06 NOTE — LETTER
2024       RE: Mireya Fritz  [address] [facility] [facility] [address]     Dear Colleague,    Thank you for referring your patient, Mireya Fritz, to the [facility] [place] at [facility]. Please see a copy of my visit note below.    Chief Complaint   Patient presents with     [facility]     Recurrent UTI'S    Angela Vance LPN       Mireya Fritz is a 23 year old female who comes in today with concerns for recurrent bladder infection.    Mireya reports a history of six UTIs this past year. Each time she experienced painful urination and reported to [facility] for culture and treatment. She has Bouf messages showing culture confirmed positives with E. Coli x 2. SYmptom onset is urethral burning which is constant-both when urinating and at rest. Her recent trip to the ED with confirmed E. Coli UTI also started this way and she did have flank pain. No history of pylo, Abdominal US or CT. Between infections she takes OTC Azo with cranberry and occasionally D-mannose. History of chronic yeast infections two years ago and a vulvar biopsy at [facility] showed likely LS, she treates flares with clobetasol ointment. Sex is not a trigger if she urinates after sex.     History of UTI  - 23: E coli  - 24 <10,000FKI/mL Strep agalactiae  - 2024: E coli treated via [facility]  - 2024 E coli treated via [facility]    Hydration   > 70 ounces/day water  Soda: 2 cans diet coke/day with no associated bladder symptoms    Family history: father straight caths, unaware of associated diagnosis. Grandfather  of kidney cancer    Surgical history: No abdominal surgeries. Wt loss surgery with liposuction at [facility] 2023.     Patient's history of childhood UTI none  Additional risk factors include: multiple courses antibiotic in past year with keflex and ciprofloxacin per chart  LACTATION NOTE - INFANT    Evaluation Type  Evaluation Type: Inpatient    Problems & Assessment  Problems Diagnosed or Identified: Sleepy  Infant Assessment: Hunger cues present  Muscle tone: Appropriate for GA    Feeding Assessment  Summary Current Feeding: Adlib;Breastfeeding exclusively  Breastfeeding Assessment: Sustained nutrititive latch w/audible swallows; Coordinated suck/swallow; Tolerated feeding well  Breastfeeding Positions: cross cradle;cradle;right breast;left breast  Latch: Repeated attempts, hold nipple in mouth, stimulate to suck  Audible Sucks/Swallows: Spontaneous and intermittent (24 hours old)  Type of Nipple: Everted (after stimulation)  Comfort (Breast/Nipple): Soft/non-tender  Hold (Positioning): Full assist, teach one side, mother does other, staff holds  [facility] Score: 8 "review    Patient's symptoms today include: none  Treatments tried: keflex, ciprofloxacin; OTC prevention with D mannose, cranberry tablets; symptom relief with NSAIDS and Azo    ROS:  CONSTITUTIONAL: NEGATIVE for fever, chills  EYES: NEGATIVE for vision changes   RESP: NEGATIVE for significant cough or SOB  CV: NEGATIVE for chest pain, palpitations   GI: NEGATIVE for nausea, abdominal pain, heartburn, or change in bowel habits  : NEGATIVE for frequency, dysuria, or hematuria  MUSCULOSKELETAL: NEGATIVE for significant arthralgias or myalgia  NEURO: NEGATIVE for weakness, dizziness or paresthesias or headache    OBJECTIVE:  /77   Pulse 104   Ht 1.702 m (5' 7\")   Wt 100.7 kg (222 lb)   LMP  (LMP Unknown)   BMI 34.77 kg/m     EXAM:  GENERAL APPEARANCE: healthy, alert and no distress  RESP: no increased work of breathing  CV: no SOB, chest pressure or pain  ABDOMEN:  soft, nontender, no CVA tenderness    PLAN:  (N39.0) Recurrent UTI  Encounter Diagnosis   Name Primary?     Recurrent UTI      Comment: Mireya has experienced four culture proven UTI's over the past year. We discussed prevention strategies including OTC 25 PAC cranberry and D-mannose versus a daily prophylactic antibiotic. She would like to trial prophylactic Macrobid over the next 6 months and return to clinic at the first UTI symptom to get a culture.     We discussed using [employer] to obtain better price for Macrobid 90 day supply not covered by insurance.     Plan:   - [employer] message us and come to clinic with any UTI symptoms to submit Urine culture    - START Macrobid preventative antibiotic   - Consider continuing your cranberry supplement. Look for 25 PAC cranberry supplements like Uquora ( www.uqora.com ) or Elura (ww.[employer] )   - Return to clinic in 6 months or sooner as needed for evaluation of this plan     I spent a total of 30 minutes with  Mireya Fritz assessing patient's relevant history, evaluating patient, " discussing treatment options and coordinating care.     Dari Dozier DNP NP  Female Pelvic Medicine and Reconstructive Surgery ( Urogynecology )    I attest that I was present for the history and personally performed the physical exam and medical decision making and that I agree with the findings and treatment plan outlined above.     I spent a total of 30 minutes with  Ms. Fritz  on the date of the encounter in chart review, face to face patient visit, review of tests, documentation and/or discussion with other providers about the issues documented above.     Fabio Steele MD  Professor, OB/GYN  Urogynecologist        Again, thank you for allowing me to participate in the care of your patient.      Sincerely,    Fabio Steele MD

## 2024-11-26 ASSESSMENT — PATIENT HEALTH QUESTIONNAIRE - PHQ9: SUM OF ALL RESPONSES TO PHQ QUESTIONS 1-9: 13

## 2024-12-11 ENCOUNTER — TELEPHONE (OUTPATIENT)
Dept: PEDIATRICS | Facility: CLINIC | Age: 24
End: 2024-12-11
Payer: COMMERCIAL

## 2024-12-11 NOTE — TELEPHONE ENCOUNTER
Pt calling with the following concerns:    - has migraine headaches since last Thursday  - is taking Excedrin & other otc meds which haven't been helping her HA  - also feels irritable, nauseous & very tied  - able to keep food & fluids down, but her appetite is low  - uses Emgality inj every 4 weeks for migraine HA which is rx;ed by her neurologist  - her next dose is due this Sat(12/14), she would like to know whether she can give her inj today instead of Sat?  - denies any vomiting, GI sx's, dizziness, confusion, severe HA, vision trouble, cardiac/stroke sx's or fever/chills    Advised her to go to UC or UR for possible toradol inj and/or other treatment options to control her on-going migraine HA. Also, advised her to contact her neurologist to ask questions on taking Emgality sooner. Pt agrees with the plan. With any rapid worsening sx's, advised her to go to ED. Pt agrees. No other questions or concerns from pt.     Rosemarie Clay RN  [facility]

## 2024-12-23 DIAGNOSIS — N94.6 DYSMENORRHEA: ICD-10-CM

## 2024-12-23 RX ORDER — NORETHINDRONE ACETATE AND ETHINYL ESTRADIOL 1MG-20(21)
1 KIT ORAL DAILY
Qty: 84 TABLET | Refills: 1 | Status: SHIPPED | OUTPATIENT
Start: 2024-12-23

## 2024-12-28 ENCOUNTER — MYC MEDICAL ADVICE (OUTPATIENT)
Dept: PEDIATRICS | Facility: CLINIC | Age: 24
End: 2024-12-28
Payer: COMMERCIAL

## 2024-12-30 ENCOUNTER — TELEPHONE (OUTPATIENT)
Dept: PEDIATRICS | Facility: CLINIC | Age: 24
End: 2024-12-30
Payer: COMMERCIAL

## 2024-12-30 NOTE — TELEPHONE ENCOUNTER
Prior Authorization Retail Medication Request    Medication/Dose: Wegovy 0.5mg/0.5mL auto injectors   Diagnosis and ICD code (if different than what is on RX):  BMI 35.0-35.9,adult [Z68.35]   New/renewal/insurance change PA/secondary ins. PA:  Previously Tried and Failed:    Rationale:      Insurance   Primary: [employer]  Insurance ID:  [id number]    Secondary (if applicable):  Insurance ID:      Pharmacy Information (if different than what is on RX)  Name:  Nishant Mejias  Phone:  [phone]  Fax: [phone]    Sandy WHITEHEAD CMA

## 2024-12-31 NOTE — TELEPHONE ENCOUNTER
Retail Pharmacy Prior Authorization Team   Phone: [phone]    Prior Authorization Approval    Medication: WEGOVY 0.5 MG/0.5ML SC SOAJ  Authorization Effective Date: 12/1/2024  Authorization Expiration Date: 6/29/2025  Insurance Company: [employer] - Phone [phone] Fax [phone]  Which Pharmacy is filling the prescription: [id number] [address] AT [place] [address] & [address]  Pharmacy Notified: YES, pharmacy was experiencing phone issues. I faxed the outcome    Patient Notified:via [employer] Message

## 2025-01-24 DIAGNOSIS — F41.1 GAD (GENERALIZED ANXIETY DISORDER): ICD-10-CM

## 2025-01-27 RX ORDER — BUSPIRONE HYDROCHLORIDE 30 MG/1
30 TABLET ORAL 2 TIMES DAILY
Qty: 180 TABLET | Refills: 3 | Status: SHIPPED | OUTPATIENT
Start: 2025-01-27

## 2025-01-27 NOTE — TELEPHONE ENCOUNTER
Refill sent    Please help patient schedule follow up visit with me - ok for RADHA - sometime in the next few months

## 2025-01-29 NOTE — TELEPHONE ENCOUNTER
Patient read her [employer] message and has been contacted. Closing encounter per protocol.    Loulou Samaniego, CMA

## 2025-04-14 ENCOUNTER — HOSPITAL ENCOUNTER (EMERGENCY)
Facility: CLINIC | Age: 25
Discharge: HOME | End: 2025-04-14
Attending: EMERGENCY MEDICINE
Payer: COMMERCIAL

## 2025-04-14 VITALS
WEIGHT: 239.2 LBS | OXYGEN SATURATION: 99 % | RESPIRATION RATE: 16 BRPM | BODY MASS INDEX: 37.54 KG/M2 | TEMPERATURE: 97.9 F | SYSTOLIC BLOOD PRESSURE: 134 MMHG | HEART RATE: 83 BPM | DIASTOLIC BLOOD PRESSURE: 97 MMHG | HEIGHT: 67 IN

## 2025-04-14 DIAGNOSIS — G24.3 SPASMODIC TORTICOLLIS: ICD-10-CM

## 2025-04-14 DIAGNOSIS — S46.811A TRAPEZIUS STRAIN, RIGHT, INITIAL ENCOUNTER: ICD-10-CM

## 2025-04-14 DIAGNOSIS — S16.1XXA STRAIN OF NECK MUSCLE, INITIAL ENCOUNTER: ICD-10-CM

## 2025-04-14 PROCEDURE — 250N000013 HC RX MED GY IP 250 OP 250 PS 637: Performed by: EMERGENCY MEDICINE

## 2025-04-14 PROCEDURE — 99283 EMERGENCY DEPT VISIT LOW MDM: CPT

## 2025-04-14 RX ORDER — IBUPROFEN 800 MG/1
800 TABLET, FILM COATED ORAL ONCE
Status: COMPLETED | OUTPATIENT
Start: 2025-04-14 | End: 2025-04-14

## 2025-04-14 RX ORDER — CYCLOBENZAPRINE HCL 10 MG
10 TABLET ORAL ONCE
Status: COMPLETED | OUTPATIENT
Start: 2025-04-14 | End: 2025-04-14

## 2025-04-14 RX ORDER — OXYCODONE HYDROCHLORIDE 5 MG/1
5 TABLET ORAL ONCE
Status: COMPLETED | OUTPATIENT
Start: 2025-04-14 | End: 2025-04-14

## 2025-04-14 RX ORDER — IBUPROFEN 800 MG/1
800 TABLET, FILM COATED ORAL EVERY 8 HOURS PRN
Qty: 30 TABLET | Refills: 0 | Status: SHIPPED | OUTPATIENT
Start: 2025-04-14

## 2025-04-14 RX ORDER — CYCLOBENZAPRINE HCL 10 MG
5 TABLET ORAL 3 TIMES DAILY PRN
Qty: 15 TABLET | Refills: 0 | Status: SHIPPED | OUTPATIENT
Start: 2025-04-14 | End: 2025-04-19

## 2025-04-14 RX ADMIN — OXYCODONE HYDROCHLORIDE 5 MG: 5 TABLET ORAL at 08:21

## 2025-04-14 RX ADMIN — IBUPROFEN 800 MG: 800 TABLET, FILM COATED ORAL at 08:21

## 2025-04-14 RX ADMIN — CYCLOBENZAPRINE 10 MG: 10 TABLET, FILM COATED ORAL at 08:22

## 2025-04-14 ASSESSMENT — COLUMBIA-SUICIDE SEVERITY RATING SCALE - C-SSRS
1. IN THE PAST MONTH, HAVE YOU WISHED YOU WERE DEAD OR WISHED YOU COULD GO TO SLEEP AND NOT WAKE UP?: NO
6. HAVE YOU EVER DONE ANYTHING, STARTED TO DO ANYTHING, OR PREPARED TO DO ANYTHING TO END YOUR LIFE?: NO
2. HAVE YOU ACTUALLY HAD ANY THOUGHTS OF KILLING YOURSELF IN THE PAST MONTH?: NO

## 2025-04-14 ASSESSMENT — ACTIVITIES OF DAILY LIVING (ADL): ADLS_ACUITY_SCORE: 41

## 2025-04-14 NOTE — Clinical Note
Mireya Fritz was seen and treated in our emergency department on 4/14/2025.  She may return to work on 04/17/2025.       If you have any questions or concerns, please don't hesitate to call.      Milton Ellis MD
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
## 2025-04-14 NOTE — ED TRIAGE NOTES
Patient reports right sided neck and shoulder pain after injury last evening.  ABCs intact, A&Ox4     Triage Assessment (Adult)       Row Name 04/14/25 0723          Triage Assessment    Airway WDL WDL        Respiratory WDL    Respiratory WDL WDL        Skin Circulation/Temperature WDL    Skin Circulation/Temperature WDL WDL        Cardiac WDL    Cardiac WDL WDL        Peripheral/Neurovascular WDL    Peripheral Neurovascular WDL WDL        Cognitive/Neuro/Behavioral WDL    Cognitive/Neuro/Behavioral WDL WDL

## 2025-04-14 NOTE — DISCHARGE INSTRUCTIONS
For pain: Use ice for next 48 hours. After that, can start including heat as well.   Continue tylenol 1,000mg every 6 hours as needed.     You can also take ibuprofen 800mg every 8 hours or 600mg every 6 hours. It's ok to take this with tylenol and the muscle relaxer.   Flexeril 5-10mg three times daily as needed - take one of these doses at bedtime.     If no improvement by end of the week/early next week, folllow-up with orthopedics and consider PT.     Return to emergency department for worsening/uncontrollable pain, weakness/dropping things with right hand, incontinence of urine/stool, or for any other concerns.

## 2025-05-11 ENCOUNTER — MYC MEDICAL ADVICE (OUTPATIENT)
Dept: PEDIATRICS | Facility: CLINIC | Age: 25
End: 2025-05-11
Payer: COMMERCIAL

## 2025-05-11 DIAGNOSIS — N94.6 DYSMENORRHEA: ICD-10-CM

## 2025-05-12 RX ORDER — NORETHINDRONE ACETATE AND ETHINYL ESTRADIOL 1MG-20(21)
1 KIT ORAL DAILY
Qty: 84 TABLET | Refills: 1 | Status: SHIPPED | OUTPATIENT
Start: 2025-05-12 | End: 2025-05-12

## 2025-05-12 RX ORDER — NORETHINDRONE ACETATE AND ETHINYL ESTRADIOL 1MG-20(21)
1 KIT ORAL DAILY
Qty: 84 TABLET | Refills: 3 | Status: SHIPPED | OUTPATIENT
Start: 2025-05-12

## 2025-05-12 NOTE — TELEPHONE ENCOUNTER
RN contacted pharmacy at [phone]. Pharmacy confirmed they are requesting an updated prescription with note stating patient does not take placebo pill week.    RN pended prescription and routing to provider to review and sign.    Chana Castillo RN

## 2025-05-19 ENCOUNTER — OFFICE VISIT (OUTPATIENT)
Dept: PEDIATRICS | Facility: CLINIC | Age: 25
End: 2025-05-19
Payer: COMMERCIAL

## 2025-05-19 VITALS
HEART RATE: 90 BPM | RESPIRATION RATE: 20 BRPM | TEMPERATURE: 96.2 F | SYSTOLIC BLOOD PRESSURE: 122 MMHG | OXYGEN SATURATION: 98 % | WEIGHT: 234.6 LBS | BODY MASS INDEX: 36.82 KG/M2 | DIASTOLIC BLOOD PRESSURE: 86 MMHG | HEIGHT: 67 IN

## 2025-05-19 DIAGNOSIS — B00.1 COLD SORE: Primary | ICD-10-CM

## 2025-05-19 PROCEDURE — 3074F SYST BP LT 130 MM HG: CPT | Performed by: STUDENT IN AN ORGANIZED HEALTH CARE EDUCATION/TRAINING PROGRAM

## 2025-05-19 PROCEDURE — 3079F DIAST BP 80-89 MM HG: CPT | Performed by: STUDENT IN AN ORGANIZED HEALTH CARE EDUCATION/TRAINING PROGRAM

## 2025-05-19 PROCEDURE — 87529 HSV DNA AMP PROBE: CPT | Performed by: STUDENT IN AN ORGANIZED HEALTH CARE EDUCATION/TRAINING PROGRAM

## 2025-05-19 PROCEDURE — 99214 OFFICE O/P EST MOD 30 MIN: CPT | Performed by: STUDENT IN AN ORGANIZED HEALTH CARE EDUCATION/TRAINING PROGRAM

## 2025-05-19 PROCEDURE — 1125F AMNT PAIN NOTED PAIN PRSNT: CPT | Performed by: STUDENT IN AN ORGANIZED HEALTH CARE EDUCATION/TRAINING PROGRAM

## 2025-05-19 RX ORDER — LIDOCAINE HYDROCHLORIDE 20 MG/ML
15 SOLUTION OROPHARYNGEAL
Qty: 100 ML | Refills: 2 | Status: SHIPPED | OUTPATIENT
Start: 2025-05-19

## 2025-05-19 RX ORDER — VALACYCLOVIR HYDROCHLORIDE 1 G/1
1000 TABLET, FILM COATED ORAL 2 TIMES DAILY
Qty: 20 TABLET | Refills: 0 | Status: SHIPPED | OUTPATIENT
Start: 2025-05-19 | End: 2025-05-29

## 2025-05-19 ASSESSMENT — PAIN SCALES - GENERAL: PAINLEVEL_OUTOF10: MODERATE PAIN (6)

## 2025-05-19 ASSESSMENT — PATIENT HEALTH QUESTIONNAIRE - PHQ9
SUM OF ALL RESPONSES TO PHQ QUESTIONS 1-9: 10
10. IF YOU CHECKED OFF ANY PROBLEMS, HOW DIFFICULT HAVE THESE PROBLEMS MADE IT FOR YOU TO DO YOUR WORK, TAKE CARE OF THINGS AT HOME, OR GET ALONG WITH OTHER PEOPLE: SOMEWHAT DIFFICULT
SUM OF ALL RESPONSES TO PHQ QUESTIONS 1-9: 10

## 2025-05-19 NOTE — PROGRESS NOTES
"  Assessment & Plan     Cold sore  Patient with fevers, body aches, and ulcerated mouth sores on tongue, cheek, gums concerning for primary HSV episode vs herpangina, coxsackievirus, echovirus. 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 Overall appears well hydrated and pain is somewhat manageable with ibuprofen (Advil or Motrin). Discussed saltwater and lidocaine mouth rinses, benzocaine or menthol spray, pushing fluids, continue NSAID use. Swab sent for HSV and starting valtrex empirically. Can stop if HSV PCR negative. Discussed plan of care and reasons to return to clinic or present to the ED (emergency department). Patient and/or guardian in agreement with plan.  - valACYclovir (VALTREX) 1000 mg tablet; Take 1 tablet (1,000 mg) by mouth 2 times daily for 10 days.  - lidocaine, viscous, (XYLOCAINE) 2 % solution; Swish and spit 15 mLs in mouth every 3 hours as needed for pain. ; Max 8 doses/24 hour period.  - Herpes Simplex Virus 1&2 by PCR; Future  - Herpes Simplex Virus 1&2 by PCR            BMI  Estimated body mass index is 36.74 kg/m  as calculated from the following:    Height as of this encounter: 1.702 m (5' 7\").    Weight as of this encounter: 106.4 kg (234 lb 9.6 oz).             Ahsan Gomes is a 24 year old, presenting for the following health issues:  Mouth Lesions        5/19/2025    10:51 AM   Additional Questions   Roomed by S   Accompanied by significant other     Mouth Lesions    History of Present Illness       Reason for visit:  Fever, body aches, canker sores  Symptom onset:  3-7 days ago  Symptoms include:  Body aches, very tired, pain in mouth  Symptom intensity:  Severe  Symptom progression:  Worsening  Had these symptoms before:  No  What makes it worse:  Drinking, eating, talking  What makes it better:  Tylenol   She is taking medications regularly.        Fever of 101.2 took ibuprofen 400 mg last dose was 1 hour ago.   Started 6 days ago  Started with fever  [place]  On an off fevers, body aches, cold sores              Objective    /86 (BP Location: Right arm, Patient Position: Sitting, Cuff Size: Adult Regular)   Pulse 90   Temp (!) 96.2  F (35.7  C) (Temporal)   Resp 20   Ht 1.702 m (5' 7\")   Wt 106.4 kg (234 lb 9.6 oz)   LMP  (LMP Unknown)   SpO2 98%   BMI 36.74 kg/m    Body mass index is 36.74 kg/m .  Physical Exam   GENERAL: alert and no distress  EYES: Eyes grossly normal to inspection, and conjunctivae and sclerae normal  HENT: normal cephalic/atraumatic, oral mucous membranes moist, and multiple (>10) scattered shallow ulcerated papules on erythematous base on tongue, lower gum, buccal mucosa bilaterally            Signed Electronically by: Deirdre E. Milligan, MD    "

## 2025-05-20 ENCOUNTER — RESULTS FOLLOW-UP (OUTPATIENT)
Dept: PEDIATRICS | Facility: CLINIC | Age: 25
End: 2025-05-20

## 2025-05-20 DIAGNOSIS — B00.1 COLD SORE: Primary | ICD-10-CM

## 2025-05-20 LAB
HSV1 DNA SPEC QL NAA+PROBE: DETECTED
HSV2 DNA SPEC QL NAA+PROBE: NOT DETECTED
SPECIMEN TYPE: ABNORMAL

## 2025-05-20 RX ORDER — VALACYCLOVIR HYDROCHLORIDE 1 G/1
2000 TABLET, FILM COATED ORAL 2 TIMES DAILY
Qty: 12 TABLET | Refills: 5 | Status: SHIPPED | OUTPATIENT
Start: 2025-05-20 | End: 2025-05-21

## 2025-06-21 ENCOUNTER — HEALTH MAINTENANCE LETTER (OUTPATIENT)
Age: 25
End: 2025-06-21

## 2025-07-15 DIAGNOSIS — G43.009 MIGRAINE WITHOUT AURA AND WITHOUT STATUS MIGRAINOSUS, NOT INTRACTABLE: ICD-10-CM

## 2025-07-16 RX ORDER — GALCANEZUMAB 120 MG/ML
INJECTION, SOLUTION SUBCUTANEOUS
Qty: 1 ML | Refills: 3 | Status: SHIPPED | OUTPATIENT
Start: 2025-07-16

## 2025-07-16 NOTE — TELEPHONE ENCOUNTER
Last Written Prescription Date:  5/20/24  Last Fill Quantity: 1 mL,  # refills: 11   Last office visit: 5/20/2024  Future Office Visit: None scheduled.     Sent message to schedule follow up.     Routing refill request to provider for review/approval because:  Drug not on the [facility] refill protocol   Patient needs to be seen because it has been more than 1 year since last office visit.    Keiry BERRY RN, BSN  [facility]

## 2025-07-21 ENCOUNTER — VIRTUAL VISIT (OUTPATIENT)
Dept: PEDIATRICS | Facility: CLINIC | Age: 25
End: 2025-07-21
Payer: COMMERCIAL

## 2025-07-21 ENCOUNTER — TELEPHONE (OUTPATIENT)
Dept: PEDIATRICS | Facility: CLINIC | Age: 25
End: 2025-07-21

## 2025-07-21 DIAGNOSIS — F41.1 GAD (GENERALIZED ANXIETY DISORDER): ICD-10-CM

## 2025-07-21 DIAGNOSIS — M25.551 HIP PAIN, RIGHT: ICD-10-CM

## 2025-07-21 DIAGNOSIS — M53.3 SACROILIAC JOINT PAIN: ICD-10-CM

## 2025-07-21 DIAGNOSIS — F32.1 MODERATE MAJOR DEPRESSION (H): Primary | ICD-10-CM

## 2025-07-21 PROCEDURE — 98006 SYNCH AUDIO-VIDEO EST MOD 30: CPT | Performed by: PEDIATRICS

## 2025-07-21 RX ORDER — SERTRALINE HYDROCHLORIDE 100 MG/1
200 TABLET, FILM COATED ORAL DAILY
Qty: 180 TABLET | Refills: 3 | Status: SHIPPED | OUTPATIENT
Start: 2025-07-21

## 2025-07-21 NOTE — TELEPHONE ENCOUNTER
Called and left message for pt.  Per provider: Please help schedule physical with me on a Monday evening this fall or winter - put in Virtual spots at 5 or 5:30 are ok     Sandy WHITEHEAD CMA

## 2025-07-21 NOTE — PROGRESS NOTES
"Mireya is a 24 year old who is being evaluated via a billable video visit.    How would you like to obtain your AVS? MyChart  If the video visit is dropped, the invitation should be resent by: Text to cell phone: [phone]  Will anyone else be joining your video visit? No      Assessment & Plan       ICD-10-CM    1. Moderate major depression (H)  F32.1 sertraline (ZOLOFT) 100 MG tablet    Increase sertraline dose to 200mg daily and follow.  Restart therapy.   To alert me if not improving or worsening before our next visit      2. DIAZ (generalized anxiety disorder)  F41.1 sertraline (ZOLOFT) 100 MG tablet    See above      3. BMI 35.0-35.9,adult  Z68.35 Patient to alert me when she has new insurance and we can retry GLP-1 medications      4. Sacroiliac joint pain  M53.3 Orthopedic  Referral    Has SI joint belt and encouraged to wear consistently for a few weeks and if that isn't improving, plan orthopedics eval      5. Hip pain, right  M25.551 Orthopedic  Referral    Lateral hip pain            BMI  Estimated body mass index is 36.74 kg/m  as calculated from the following:    Height as of 5/19/25: 1.702 m (5' 7\").    Weight as of 5/19/25: 106.4 kg (234 lb 9.6 oz).   Weight management plan: Discussed healthy diet and exercise guidelines and will alert me when on naomi's insurance        Subjective   Mireya is a 24 year old, presenting for the following health issues:  No chief complaint on file.    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      Depression and anxiety - hasn't had a therapist for a bit - her old therapist came to work at her clinic.      Depression has been much higher recently.  More frequent days with difficulty getting out of bed and feeling sad and tired.  Exhausted and irritable.  LImited motivation.  No thoughts of self harm.  Living with her Galdino salgado.    Tolerating " sertraline well.      Had been using Wegovy for weight loss, but then wasn't covered by insurance.      Migraines have been under good control on emgality after a very long struggles with migraines    Having SI joint pain that is significant        Objective           Vitals:  No vitals were obtained today due to virtual visit.    Physical Exam   GENERAL: alert and no distress  EYES: Eyes grossly normal to inspection.  No discharge or erythema, or obvious scleral/conjunctival abnormalities.  RESP: No audible wheeze, cough, or visible cyanosis.    SKIN: Visible skin clear. No significant rash, abnormal pigmentation or lesions.  NEURO: Cranial nerves grossly intact.  Mentation and speech appropriate for age.  PSYCH: Appropriate affect, tone, and pace of words          Video-Visit Details    Type of service:  Video Visit   Originating Location (pt. Location): Home    Distant Location (provider location):  On-site  Platform used for Video Visit: Jeremy  Signed Electronically by: Lydia Rodriguez MD

## 2025-07-22 ENCOUNTER — PATIENT OUTREACH (OUTPATIENT)
Dept: CARE COORDINATION | Facility: CLINIC | Age: 25
End: 2025-07-22
Payer: COMMERCIAL

## 2025-07-23 NOTE — TELEPHONE ENCOUNTER
2nd attempt:     Contacts       Contact Date/Time Type Contact Phone/Fax    07/23/2025 11:39 AM CDT Phone (Outgoing) Mireya Fritz (Self) [phone] (M)    Left Message

## 2025-07-24 ENCOUNTER — PATIENT OUTREACH (OUTPATIENT)
Dept: CARE COORDINATION | Facility: CLINIC | Age: 25
End: 2025-07-24
Payer: COMMERCIAL